# Patient Record
Sex: FEMALE | URBAN - METROPOLITAN AREA
[De-identification: names, ages, dates, MRNs, and addresses within clinical notes are randomized per-mention and may not be internally consistent; named-entity substitution may affect disease eponyms.]

---

## 2019-02-07 ENCOUNTER — APPOINTMENT (RX ONLY)
Dept: URBAN - METROPOLITAN AREA CLINIC 38 | Facility: CLINIC | Age: 79
Setting detail: DERMATOLOGY
End: 2019-02-07

## 2019-02-07 DIAGNOSIS — L57.0 ACTINIC KERATOSIS: ICD-10-CM

## 2019-02-07 DIAGNOSIS — L71.8 OTHER ROSACEA: ICD-10-CM

## 2019-02-07 PROCEDURE — ? COUNSELING

## 2019-02-07 PROCEDURE — ? LIQUID NITROGEN

## 2019-02-07 PROCEDURE — 17000 DESTRUCT PREMALG LESION: CPT

## 2019-02-07 PROCEDURE — 17003 DESTRUCT PREMALG LES 2-14: CPT

## 2019-02-07 PROCEDURE — 99202 OFFICE O/P NEW SF 15 MIN: CPT | Mod: 25

## 2019-02-07 ASSESSMENT — LOCATION DETAILED DESCRIPTION DERM
LOCATION DETAILED: LEFT DORSAL RING METACARPOPHALANGEAL JOINT
LOCATION DETAILED: NASAL DORSUM
LOCATION DETAILED: LEFT ULNAR DORSAL HAND
LOCATION DETAILED: RIGHT MEDIAL FOREHEAD

## 2019-02-07 ASSESSMENT — LOCATION SIMPLE DESCRIPTION DERM
LOCATION SIMPLE: RIGHT FOREHEAD
LOCATION SIMPLE: LEFT HAND
LOCATION SIMPLE: NOSE

## 2019-02-07 ASSESSMENT — LOCATION ZONE DERM
LOCATION ZONE: FACE
LOCATION ZONE: NOSE
LOCATION ZONE: HAND

## 2019-02-07 NOTE — HPI: SKIN LESION
Is This A New Presentation, Or A Follow-Up?: Skin Lesion
How Severe Is Your Skin Lesion?: mild
Has Your Skin Lesion Been Treated?: been treated
When Was It Treated?: 2018

## 2020-02-26 ENCOUNTER — APPOINTMENT (RX ONLY)
Dept: URBAN - METROPOLITAN AREA CLINIC 38 | Facility: CLINIC | Age: 80
Setting detail: DERMATOLOGY
End: 2020-02-26

## 2020-02-26 DIAGNOSIS — Z85.828 PERSONAL HISTORY OF OTHER MALIGNANT NEOPLASM OF SKIN: ICD-10-CM

## 2020-02-26 DIAGNOSIS — L82.1 OTHER SEBORRHEIC KERATOSIS: ICD-10-CM

## 2020-02-26 DIAGNOSIS — Z71.89 OTHER SPECIFIED COUNSELING: ICD-10-CM

## 2020-02-26 DIAGNOSIS — L81.4 OTHER MELANIN HYPERPIGMENTATION: ICD-10-CM

## 2020-02-26 DIAGNOSIS — L57.0 ACTINIC KERATOSIS: ICD-10-CM

## 2020-02-26 DIAGNOSIS — D22 MELANOCYTIC NEVI: ICD-10-CM

## 2020-02-26 DIAGNOSIS — L20.89 OTHER ATOPIC DERMATITIS: ICD-10-CM

## 2020-02-26 DIAGNOSIS — D18.0 HEMANGIOMA: ICD-10-CM

## 2020-02-26 PROBLEM — L20.84 INTRINSIC (ALLERGIC) ECZEMA: Status: ACTIVE | Noted: 2020-02-26

## 2020-02-26 PROBLEM — D18.01 HEMANGIOMA OF SKIN AND SUBCUTANEOUS TISSUE: Status: ACTIVE | Noted: 2020-02-26

## 2020-02-26 PROBLEM — D22.9 MELANOCYTIC NEVI, UNSPECIFIED: Status: ACTIVE | Noted: 2020-02-26

## 2020-02-26 PROCEDURE — 17000 DESTRUCT PREMALG LESION: CPT

## 2020-02-26 PROCEDURE — ? LIQUID NITROGEN

## 2020-02-26 PROCEDURE — 99213 OFFICE O/P EST LOW 20 MIN: CPT | Mod: 25

## 2020-02-26 PROCEDURE — ? COUNSELING

## 2020-02-26 ASSESSMENT — LOCATION DETAILED DESCRIPTION DERM
LOCATION DETAILED: PERIUMBILICAL SKIN
LOCATION DETAILED: RIGHT INFERIOR UPPER BACK
LOCATION DETAILED: RIGHT VENTRAL PROXIMAL FOREARM
LOCATION DETAILED: RIGHT ANTERIOR PROXIMAL THIGH
LOCATION DETAILED: RIGHT INFERIOR POSTAURICULAR SKIN

## 2020-02-26 ASSESSMENT — LOCATION SIMPLE DESCRIPTION DERM
LOCATION SIMPLE: ABDOMEN
LOCATION SIMPLE: RIGHT THIGH
LOCATION SIMPLE: RIGHT UPPER BACK
LOCATION SIMPLE: POSTERIOR SCALP
LOCATION SIMPLE: RIGHT FOREARM

## 2020-02-26 ASSESSMENT — LOCATION ZONE DERM
LOCATION ZONE: SCALP
LOCATION ZONE: TRUNK
LOCATION ZONE: ARM
LOCATION ZONE: LEG

## 2020-10-06 ENCOUNTER — APPOINTMENT (OUTPATIENT)
Dept: RADIOLOGY | Facility: MEDICAL CENTER | Age: 80
End: 2020-10-06
Attending: HOSPITALIST
Payer: MEDICARE

## 2020-10-06 ENCOUNTER — HOSPITAL ENCOUNTER (OUTPATIENT)
Facility: MEDICAL CENTER | Age: 80
End: 2020-10-07
Attending: INTERNAL MEDICINE | Admitting: INTERNAL MEDICINE
Payer: MEDICARE

## 2020-10-06 ENCOUNTER — HOSPITAL ENCOUNTER (OUTPATIENT)
Dept: RADIOLOGY | Facility: MEDICAL CENTER | Age: 80
End: 2020-10-06
Payer: MEDICARE

## 2020-10-06 DIAGNOSIS — G45.9 TIA (TRANSIENT ISCHEMIC ATTACK): ICD-10-CM

## 2020-10-06 PROBLEM — E78.5 DYSLIPIDEMIA: Status: ACTIVE | Noted: 2020-10-06

## 2020-10-06 PROBLEM — G31.84 MILD COGNITIVE IMPAIRMENT: Status: ACTIVE | Noted: 2020-10-06

## 2020-10-06 PROBLEM — Z86.79 HISTORY OF ATRIAL FIBRILLATION: Status: ACTIVE | Noted: 2020-10-06

## 2020-10-06 LAB
COVID ORDER STATUS COVID19: NORMAL
SARS-COV-2 RNA RESP QL NAA+PROBE: NOTDETECTED
SPECIMEN SOURCE: NORMAL

## 2020-10-06 PROCEDURE — 93880 EXTRACRANIAL BILAT STUDY: CPT

## 2020-10-06 PROCEDURE — 70551 MRI BRAIN STEM W/O DYE: CPT

## 2020-10-06 PROCEDURE — G0378 HOSPITAL OBSERVATION PER HR: HCPCS

## 2020-10-06 PROCEDURE — C9803 HOPD COVID-19 SPEC COLLECT: HCPCS | Performed by: INTERNAL MEDICINE

## 2020-10-06 PROCEDURE — U0003 INFECTIOUS AGENT DETECTION BY NUCLEIC ACID (DNA OR RNA); SEVERE ACUTE RESPIRATORY SYNDROME CORONAVIRUS 2 (SARS-COV-2) (CORONAVIRUS DISEASE [COVID-19]), AMPLIFIED PROBE TECHNIQUE, MAKING USE OF HIGH THROUGHPUT TECHNOLOGIES AS DESCRIBED BY CMS-2020-01-R: HCPCS

## 2020-10-06 PROCEDURE — 99204 OFFICE O/P NEW MOD 45 MIN: CPT | Performed by: HOSPITALIST

## 2020-10-06 RX ORDER — ASPIRIN 300 MG/1
300 SUPPOSITORY RECTAL DAILY
Status: DISCONTINUED | OUTPATIENT
Start: 2020-10-07 | End: 2020-10-07 | Stop reason: HOSPADM

## 2020-10-06 RX ORDER — ASPIRIN 81 MG/1
324 TABLET, CHEWABLE ORAL DAILY
Status: DISCONTINUED | OUTPATIENT
Start: 2020-10-07 | End: 2020-10-07 | Stop reason: HOSPADM

## 2020-10-06 RX ORDER — ACETAMINOPHEN 325 MG/1
650 TABLET ORAL EVERY 6 HOURS PRN
Status: DISCONTINUED | OUTPATIENT
Start: 2020-10-06 | End: 2020-10-07 | Stop reason: HOSPADM

## 2020-10-06 RX ORDER — AMOXICILLIN 250 MG
2 CAPSULE ORAL 2 TIMES DAILY
Status: DISCONTINUED | OUTPATIENT
Start: 2020-10-06 | End: 2020-10-07 | Stop reason: HOSPADM

## 2020-10-06 RX ORDER — ASPIRIN 325 MG
325 TABLET ORAL DAILY
Status: DISCONTINUED | OUTPATIENT
Start: 2020-10-07 | End: 2020-10-07 | Stop reason: HOSPADM

## 2020-10-06 RX ORDER — ATORVASTATIN CALCIUM 40 MG/1
40 TABLET, FILM COATED ORAL EVERY EVENING
Status: DISCONTINUED | OUTPATIENT
Start: 2020-10-06 | End: 2020-10-07 | Stop reason: HOSPADM

## 2020-10-06 RX ORDER — BISACODYL 10 MG
10 SUPPOSITORY, RECTAL RECTAL
Status: DISCONTINUED | OUTPATIENT
Start: 2020-10-06 | End: 2020-10-07 | Stop reason: HOSPADM

## 2020-10-06 RX ORDER — POLYETHYLENE GLYCOL 3350 17 G/17G
1 POWDER, FOR SOLUTION ORAL
Status: DISCONTINUED | OUTPATIENT
Start: 2020-10-06 | End: 2020-10-07 | Stop reason: HOSPADM

## 2020-10-06 ASSESSMENT — LIFESTYLE VARIABLES
ON A TYPICAL DAY WHEN YOU DRINK ALCOHOL HOW MANY DRINKS DO YOU HAVE: 1
HOW MANY TIMES IN THE PAST YEAR HAVE YOU HAD 5 OR MORE DRINKS IN A DAY: 0
DOES PATIENT WANT TO STOP DRINKING: NO
TOTAL SCORE: 0
HAVE YOU EVER FELT YOU SHOULD CUT DOWN ON YOUR DRINKING: NO
CONSUMPTION TOTAL: NEGATIVE
TOTAL SCORE: 0
ALCOHOL_USE: YES
EVER HAD A DRINK FIRST THING IN THE MORNING TO STEADY YOUR NERVES TO GET RID OF A HANGOVER: NO
HAVE PEOPLE ANNOYED YOU BY CRITICIZING YOUR DRINKING: NO
EVER FELT BAD OR GUILTY ABOUT YOUR DRINKING: NO
TOTAL SCORE: 0
AVERAGE NUMBER OF DAYS PER WEEK YOU HAVE A DRINK CONTAINING ALCOHOL: 1

## 2020-10-06 ASSESSMENT — PATIENT HEALTH QUESTIONNAIRE - PHQ9
SUM OF ALL RESPONSES TO PHQ9 QUESTIONS 1 AND 2: 0
1. LITTLE INTEREST OR PLEASURE IN DOING THINGS: NOT AT ALL
2. FEELING DOWN, DEPRESSED, IRRITABLE, OR HOPELESS: NOT AT ALL
1. LITTLE INTEREST OR PLEASURE IN DOING THINGS: NOT AT ALL
SUM OF ALL RESPONSES TO PHQ9 QUESTIONS 1 AND 2: 0
2. FEELING DOWN, DEPRESSED, IRRITABLE, OR HOPELESS: NOT AT ALL

## 2020-10-06 ASSESSMENT — ENCOUNTER SYMPTOMS
FEVER: 0
SPEECH CHANGE: 0
CHILLS: 0
MYALGIAS: 0
DOUBLE VISION: 0
WEAKNESS: 0
DIZZINESS: 0
BLURRED VISION: 0
SHORTNESS OF BREATH: 0

## 2020-10-06 NOTE — PROGRESS NOTES
Patient arrived to room 305 from [place] with ambulance. Alert and oriented times 4, oriented patient to room. Patient ambulated from rney to bed with standby assist.  at bedside. MD paged to notify of arrival.

## 2020-10-06 NOTE — PROGRESS NOTES
[facility] TRIAGE OFFICER DIRECT ADMISSION REPORT  Transferring facility: [facility]  Transferring physician: Dr Bills  Transferring facility/physician contact number: n/a  Chief complaint: TIA  Pertinent history & patient course:     80-year-old female presented to the [facility] with left-sided weakness, resolved, the patient will be transferred to our facility for higher level of care and neurology consult with work-up for TIA and stroke.     Further work up or recommendations per triage officer prior to transfer: None  Consultants called prior to transfer and pertinent input from consultants: Neurology Dr. Thompson  Patient accepted for transfer: Yes  Consultants to be called upon arrival: Dr. Thompson neurologist  Admission status: Observation.   Floor requested: Neurology or CDU  If ICU transfer, name of intensivist case discussed with and pertinent input from critical care: N/A    Please inform the triage officer upon arrival of the patient to [facility] for assignment of a hospitalist to perform admission.     For any question or concerns regarding the care of this patient, please reach out to the assigned hospitalist.

## 2020-10-06 NOTE — PROGRESS NOTES
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  Had a TIA-like episode of falling over to the right side.  Started around noon today.  Resolved after 1 to 2 hours.  Questionable aphasia.  Now asymptomatic per outside ER physician.  CT head was unremarkable.  While the patient transferred here get a CTA head and neck.  Also MRI brain.  DA PT therapy with aspirin and Plavix for 21 days.  Patient has a remote history of atrial fibrillation status post ablation.  She cannot tolerate any types of anticoagulation.      Anibal Koroma MD

## 2020-10-07 ENCOUNTER — APPOINTMENT (OUTPATIENT)
Dept: CARDIOLOGY | Facility: MEDICAL CENTER | Age: 80
End: 2020-10-07
Attending: HOSPITALIST
Payer: MEDICARE

## 2020-10-07 VITALS
OXYGEN SATURATION: 93 % | DIASTOLIC BLOOD PRESSURE: 56 MMHG | HEART RATE: 71 BPM | WEIGHT: 136.69 LBS | BODY MASS INDEX: 21.45 KG/M2 | RESPIRATION RATE: 16 BRPM | HEIGHT: 67 IN | TEMPERATURE: 97.1 F | SYSTOLIC BLOOD PRESSURE: 109 MMHG

## 2020-10-07 LAB
CHOLEST SERPL-MCNC: 179 MG/DL (ref 100–199)
EST. AVERAGE GLUCOSE BLD GHB EST-MCNC: 117 MG/DL
HBA1C MFR BLD: 5.7 % (ref 0–5.6)
HDLC SERPL-MCNC: 81 MG/DL
LDLC SERPL CALC-MCNC: 81 MG/DL
LV EJECT FRACT  99904: 70
LV EJECT FRACT MOD 2C 99903: 75.59
LV EJECT FRACT MOD 4C 99902: 71.97
LV EJECT FRACT MOD BP 99901: 75.26
TRIGL SERPL-MCNC: 87 MG/DL (ref 0–149)

## 2020-10-07 PROCEDURE — 83036 HEMOGLOBIN GLYCOSYLATED A1C: CPT

## 2020-10-07 PROCEDURE — 99203 OFFICE O/P NEW LOW 30 MIN: CPT | Mod: GC | Performed by: PSYCHIATRY & NEUROLOGY

## 2020-10-07 PROCEDURE — 96372 THER/PROPH/DIAG INJ SC/IM: CPT

## 2020-10-07 PROCEDURE — G0378 HOSPITAL OBSERVATION PER HR: HCPCS

## 2020-10-07 PROCEDURE — 700102 HCHG RX REV CODE 250 W/ 637 OVERRIDE(OP): Performed by: HOSPITALIST

## 2020-10-07 PROCEDURE — 93306 TTE W/DOPPLER COMPLETE: CPT | Mod: 26 | Performed by: INTERNAL MEDICINE

## 2020-10-07 PROCEDURE — 99217 PR OBSERVATION CARE DISCHARGE: CPT | Performed by: INTERNAL MEDICINE

## 2020-10-07 PROCEDURE — 700111 HCHG RX REV CODE 636 W/ 250 OVERRIDE (IP): Performed by: HOSPITALIST

## 2020-10-07 PROCEDURE — 93306 TTE W/DOPPLER COMPLETE: CPT

## 2020-10-07 PROCEDURE — 97166 OT EVAL MOD COMPLEX 45 MIN: CPT

## 2020-10-07 PROCEDURE — 80061 LIPID PANEL: CPT

## 2020-10-07 PROCEDURE — A9270 NON-COVERED ITEM OR SERVICE: HCPCS | Performed by: HOSPITALIST

## 2020-10-07 RX ORDER — ASPIRIN 81 MG/1
81 TABLET ORAL DAILY
Qty: 90 TAB | Refills: 1 | Status: SHIPPED | OUTPATIENT
Start: 2020-10-07 | End: 2021-12-14

## 2020-10-07 RX ADMIN — ENOXAPARIN SODIUM 40 MG: 40 INJECTION SUBCUTANEOUS at 06:43

## 2020-10-07 RX ADMIN — ASPIRIN 325 MG: 325 TABLET, FILM COATED ORAL at 06:43

## 2020-10-07 ASSESSMENT — COGNITIVE AND FUNCTIONAL STATUS - GENERAL
DAILY ACTIVITIY SCORE: 24
SUGGESTED CMS G CODE MODIFIER DAILY ACTIVITY: CH

## 2020-10-07 ASSESSMENT — ACTIVITIES OF DAILY LIVING (ADL): TOILETING: INDEPENDENT

## 2020-10-07 NOTE — ASSESSMENT & PLAN NOTE
She had transient symptoms of leaning to the left today  CT head and labs were negative at [place]  She will be given aspirin and allow permissive HTN  MRI brain ordered  Carotid dopplers ordered  Echocardiogram to eval for LV thrombus  Continuous tele monitoring to eval for afib  PT/OT/ST  She is on Evolocumab for dyslipidemia  She was determined to not be a candidate for alteplase as her symptoms resolved  Neurology was consulted prior to admit and will be contacted after the MRI is back.

## 2020-10-07 NOTE — CARE PLAN
Problem: Safety  Goal: Will remain free from injury  Outcome: PROGRESSING AS EXPECTED     Problem: Mobility  Goal: Risk for activity intolerance will decrease  Outcome: PROGRESSING AS EXPECTED

## 2020-10-07 NOTE — PROGRESS NOTES
Neurology Progress Note  Neurohospitalist Service, [facility]    Referring Physician: Juan Carlos Monterroso M.D.    CC: Leaning to the Left     HPI: Ms. Herndon is a 80-year old female who was transferred from an outside hospital due to concerns for TIA.     Interval History: No acute events overnight. Patient reports that she is feeling back to baseline. She denies any balance difficulty and worked well with PT. She reports that she was tolerant to aspirin in the past and does take it occasionally at home for pain. She is also on Repatha for her cholesterol.     Carotid ultrasound, echo and MRI brain are unremarkable.     Review of systems: In addition to what is detailed in the HPI and/or updated in the interval history, all other systems reviewed and are negative.    Past Medical History:   Mild Cognitive impairment, dyslipidemia, Atrial fibrillation status post ablation [place] in 2017-intolerant to statins    FHx:  No family history of CVA, mother with Alzheimer's     SHx:   reports that she has never smoked. She has never used smokeless tobacco.    Medications:    Current Facility-Administered Medications:   •  senna-docusate (PERICOLACE or SENOKOT S) 8.6-50 MG per tablet 2 Tab, 2 Tab, Oral, BID **AND** polyethylene glycol/lytes (MIRALAX) PACKET 1 Packet, 1 Packet, Oral, QDAY PRN **AND** magnesium hydroxide (MILK OF MAGNESIA) suspension 30 mL, 30 mL, Oral, QDAY PRN **AND** bisacodyl (DULCOLAX) suppository 10 mg, 10 mg, Rectal, QDAY PRN, Jeremy Chaudhary M.D.  •  Pharmacy consult request - Allow for permissive hypertension: SBP up to 220 mmHg/DBP up to 120 mmHg x 48 hours, , Other, PHARMACY TO DOSE, Jeremy Chaudhary M.D.  •  enoxaparin (LOVENOX) inj 40 mg, 40 mg, Subcutaneous, DAILY, Jeremy Chaudhary M.D., 40 mg at 10/07/20 0643  •  acetaminophen (TYLENOL) tablet 650 mg, 650 mg, Oral, Q6HRS PRN, Jeremy Chaudhary M.D.  •  atorvastatin (LIPITOR) tablet 40 mg, 40 mg, Oral, Q EVENING, Jeremy Chaudhary  M.D.  •  aspirin (ASA) tablet 325 mg, 325 mg, Oral, DAILY, 325 mg at 10/07/20 0643 **OR** aspirin (ASA) chewable tab 324 mg, 324 mg, Oral, DAILY **OR** aspirin (ASA) suppository 300 mg, 300 mg, Rectal, DAILY, Jeremy Chaudhary M.D.    Physical Examination:     Vitals:    10/06/20 2010 10/06/20 2341 10/07/20 0500 10/07/20 0816   BP: 118/56 104/64 106/57 109/56   Pulse: 68 65 67 71   Resp: 20 18 16 16   Temp: 36.6 °C (97.8 °F) 36.4 °C (97.5 °F) 36.4 °C (97.6 °F) 36.2 °C (97.1 °F)   TempSrc: Temporal Temporal Temporal Temporal   SpO2: 92% 93% 96% 93%   Weight:       Height:           General: Patient is awake and in no acute distress  Eyes: examination of optic disks not indicated at this time  CV: RRR    NEUROLOGICAL EXAM:     Mental status: Sitting on edge of bed eating breakfast. Awake, alert and fully oriented, follows commands  Speech and language: speech is not dysarthric. The patient is able to name and repeat.  Cranial nerve exam: Pupils are equal, round and reactive to light bilaterally. Visual fields are full. Extraocular muscles are intact. Sensation in the face is intact to light touch. Face is symmetric. Hearing to finger rub equal. Palate elevates symmetrically. Shoulder shrug is full. Tongue is midline.  Motor exam: Strength is 5/5 in all extremities both distally and proximally. Tone is normal. No abnormal movements were seen on exam.  Sensory exam: No sensory deficits identified   Deep tendon reflexes:  Toes down-going bilaterally.  Coordination: no ataxia   Gait: deferred given patient preference    Objective Data:    Labs:  No results found for: PROTHROMBTM, INR   No results found for: WBC, RBC, HEMOGLOBIN, HEMATOCRIT, MCV, MCH, MCHC, MPV, NEUTSPOLYS, LYMPHOCYTES, MONOCYTES, EOSINOPHILS, BASOPHILS, HYPOCHROMIA, ANISOCYTOSIS   No results found for: SODIUM, POTASSIUM, CHLORIDE, CO2, GLUCOSE, BUN, CREATININE, BUNCREATRAT, GLOMRATE   Lab Results   Component Value Date/Time    CHOLSTRLTOT 179 10/07/2020  05:50 AM    LDL 81 10/07/2020 05:50 AM    HDL 81 10/07/2020 05:50 AM    TRIGLYCERIDE 87 10/07/2020 05:50 AM       No results found for: ALKPHOSPHAT, ASTSGOT, ALTSGPT, TBILIRUBIN     Imaging/Testing:    I interpreted and/or reviewed the patient's neuroimaging    EC-ECHOCARDIOGRAM COMPLETE W/O CONT   Final Result      US-CAROTID DOPPLER BILAT   Final Result      MR-BRAIN-W/O   Final Result      1.  Moderate cerebral atrophy. Age-appropriate.   2.  Minimal supratentorial white matter disease most consistent with microvascular ischemic change.   3.  No evidence of acute infarction, hemorrhage, or mass lesion.          Assessment and Plan:    Gloria Herndon is a 80 y.o. female presenting for whom neurology has been consulted for symptoms concerning for TIA. MRI is negative for acute infarct. It is possible that patient may have had a TIA. However, in setting of history of atrial fibrillation, patient should ideally be on anti-coagulation. Given intolerance to multiple anti-coagulants in the past, patient would like to speak to her physician in [place] before she decides to start Aspirin 81mg daily.     Plan:  - Recommend Asprin 81mg daily as patient is intolerant to multiple anti-coagulants in the past   - Continue PCSK-9 inhibitor   - Okay to discharge from neurology standpoint.  - Neurology team will be signing off, thank you for this consult.     The evaluation of the patient, and recommended management, was discussed with the Neurology attending, Dr. Thompson. I have performed a physical exam and reviewed and updated ROS and Plan today (10/7/2020). In review of yesterday's note (10/6/2020), there are no changes except as documented above.    Saige Lopez DO  Internal Medicine Resident, PGY-3

## 2020-10-07 NOTE — THERAPY
Occupational Therapy   Initial Evaluation     Patient Name: Gloria Herndon  Age:  80 y.o., Sex:  female  Medical Record #: [id number]  Today's Date: 10/7/2020          Assessment  Patient is 80 y.o. female with a diagnosis of TIA.  Additional factors influencing patient status / progress: resolution of symptoms, good family support at home. Good insight into limitations/ needs.      Plan    Recommend Occupational Therapy for Evaluation only for the following treatments:  NA.    DC Equipment Recommendations: None  Discharge Recommendations: (P) Anticipate that the patient will have no further occupational therapy needs after discharge from the hospital(may benefit from SLP to work on memory/ word finding deficit)     Subjective    Pleasant and cooperative, motivated to go home     Objective       10/07/20 0730   Total Time Spent   Total Time Spent (Mins) 42   Charge Group   OT Evaluation OT Evaluation Mod   Initial Contact Note    Initial Contact Note Order Received and Verified, Evaluation Only - Patient Does Not Require Further Acute Occupational Therapy at this Time.  However, May Benefit from Post Acute Therapy for Higher Level Functional Deficits.   Prior Living Situation   Prior Services None  (has a  3 x weekly)   Housing / Facility 3 Story House   Steps Into Home   (3 flights up from garage, B rails)   Steps In Home   (flight between each floor)   Rail Both Rail (Steps into Home)   Elevator No   Bathroom Set up Walk In Shower;Grab Bars   Equipment Owned   (walking stick)   Lives with - Patient's Self Care Capacity Spouse;Adult Children   Comments adult son is living with her. recently laid off, so home as needed   Prior Level of ADL Function   Self Feeding Independent   Grooming / Hygiene Independent   Bathing Independent   Dressing Independent   Toileting Independent   Prior Level of IADL Function   Medication Management Independent   Laundry Independent   Kitchen Mobility Independent    Finances Independent   Home Management Independent   Shopping Independent   Prior Level Of Mobility Independent Without Device in Community   Driving / Transportation Driving Independent   Occupation (Pre-Hospital Vocational)   (retired infectious disease MD)   Vitals   O2 Delivery Device None - Room Air   Pain 0 - 10 Group   Therapist Pain Assessment Post Activity Pain Same as Prior to Activity;Nurse Notified;0   Cognition    Cognition / Consciousness WDL   Level of Consciousness Alert   Comments noted word finding difficulties, has been ongoing for a few months. educated regarding out patient SLP programs to asssit with compesatory strategies   Passive ROM Upper Body   Passive ROM Upper Body WDL   Active ROM Upper Body   Active ROM Upper Body  WDL   Dominant Hand Right   Strength Upper Body   Upper Body Strength  WDL   Sensation Upper Body   Upper Extremity Sensation  WDL   Upper Body Muscle Tone   Upper Body Muscle Tone  WDL   Coordination Upper Body   Coordination WDL   Balance Assessment   Sitting Balance (Static) Good   Sitting Balance (Dynamic) Good   Standing Balance (Static) Good   Standing Balance (Dynamic) Good   Weight Shift Sitting Good   Weight Shift Standing Good   Bed Mobility    Supine to Sit Supervised   Sit to Supine Supervised   Scooting Supervised   Rolling Supervised   ADL Assessment   Eating Modified Independent   Grooming Modified Independent;Standing   Bathing   (NT)   Upper Body Dressing Supervision   Lower Body Dressing Supervision   Toileting Supervision   How much help from another person does the patient currently need...   Putting on and taking off regular lower body clothing? 4   Bathing (including washing, rinsing, and drying)? 4   Toileting, which includes using a toilet, bedpan, or urinal? 4   Putting on and taking off regular upper body clothing? 4   Taking care of personal grooming such as brushing teeth? 4   Eating meals? 4   6 Clicks Daily Activity Score 24   Functional  Mobility   Sit to Stand Supervised   Bed, Chair, Wheelchair Transfer Supervised   Toilet Transfers Supervised   Transfer Method Stand Pivot   Visual Perception   Visual Perception  WDL   Activity Tolerance   Sitting in Chair ad katherine   Sitting Edge of Bed ad katherine   Standing ad katherine   Education Group   Role of Occupational Therapist Patient Response Patient;Acceptance;Explanation;Demonstration;Verbal Demonstration   Anticipated Discharge Equipment and Recommendations   DC Equipment Recommendations None   Discharge Recommendations Anticipate that the patient will have no further occupational therapy needs after discharge from the hospital  (may benefit from SLP to work on memory/ word finding deficit)   Interdisciplinary Plan of Care Collaboration   IDT Collaboration with  Nursing   Patient Position at End of Therapy In Bed;Call Light within Reach;Tray Table within Reach;Phone within Reach   Collaboration Comments report given   Session Information   Date / Session Number  10/7,1/1

## 2020-10-07 NOTE — PROGRESS NOTES
"Assessment completed. Pt A&Ox4. Respirations are even and unlabored on RA. Pt denies pain at this time. Neuro intact. States she is \"fine\". Monitors applied, VS stable, call light and belongings within reach. POC updated (possible d/c). Pt educated on room and call light, pt verbalized understanding. Communication board updated. Needs met.  at bedside.  "

## 2020-10-07 NOTE — DISCHARGE INSTRUCTIONS
Discharge Instructions    Discharged to home by car with relative. Discharged via walking, hospital escort: Refused.  Special equipment needed: Not Applicable    Be sure to schedule a follow-up appointment with your primary care doctor or any specialists as instructed.     Discharge Plan:   Diet Plan: Discussed  Activity Level: Discussed  Confirmed Follow up Appointment: Appointment Scheduled  Confirmed Symptoms Management: Discussed  Medication Reconciliation Updated: Yes  Influenza Vaccine Indication: Not indicated: Previously immunized this influenza season and > 8 years of age    I understand that a diet low in cholesterol, fat, and sodium is recommended for good health. Unless I have been given specific instructions below for another diet, I accept this instruction as my diet prescription.   Other diet: heart healthy    Special Instructions: None    · Is patient discharged on Warfarin / Coumadin?   No     Depression / Suicide Risk    As you are discharged from this [facility] facility, it is important to learn how to keep safe from harming yourself.    Recognize the warning signs:  · Abrupt changes in personality, positive or negative- including increase in energy   · Giving away possessions  · Change in eating patterns- significant weight changes-  positive or negative  · Change in sleeping patterns- unable to sleep or sleeping all the time   · Unwillingness or inability to communicate  · Depression  · Unusual sadness, discouragement and loneliness  · Talk of wanting to die  · Neglect of personal appearance   · Rebelliousness- reckless behavior  · Withdrawal from people/activities they love  · Confusion- inability to concentrate     If you or a loved one observes any of these behaviors or has concerns about self-harm, here's what you can do:  · Talk about it- your feelings and reasons for harming yourself  · Remove any means that you might use to hurt yourself (examples: pills, rope, extension cords,  firearm)  · Get professional help from the community (Mental Health, Substance Abuse, psychological counseling)  · Do not be alone:Call your Safe Contact- someone whom you trust who will be there for you.  · Call your local CRISIS HOTLINE 860-8016 or 930-867-4904  · Call your local Children's Mobile Crisis Response Team [place] (126) 504-4161 or www.Bizware  · Call the toll free National Suicide Prevention Hotlines   · National Suicide Prevention Lifeline 179-372-WYYR (3107)  · National Hope Line Network 800-SUICIDE (150-6136)

## 2020-10-07 NOTE — PROGRESS NOTES
Pt transported to MRI at this time via wheelchair. Tele temporarily disconnected per order.  Will monitor for pt's return.

## 2020-10-07 NOTE — PROGRESS NOTES
Telemetry throughout the night shows NSR 59-90 with 1st degree heart block. IL 0.21 / QRS 0.07 / QT 0.45

## 2020-10-07 NOTE — ASSESSMENT & PLAN NOTE
With history of ablation and subsequent outpatient monitoring revealing paroxysmal afib. Her cardiologist recommended a Watchman device which she deferred.   She has been intolerant to all anticoagulants and has historically refused them.   Continuous tele monitoring

## 2020-10-07 NOTE — DISCHARGE PLANNING
[facility] Transitional Care Coordination     Referral from:  Dr. Chaudhary    Facesheet indicates: MCR    Potential Rehab Diagnosis: CVA?    Chart review indicates patient may have on going medical management and may have therapy needs to possibly meet inpatient rehab facility criteria with the goal of returning to community.    D/C support: TBD     Physiatry consultation pended per protocol.      TIA?  MRI is negative for an acute infarct.  TX pending.  Waiting on additional information to determine appropriateness for acute inpatient rehabilitation. Will continue to follow.     Thank you for the referral.

## 2020-10-07 NOTE — CARE PLAN
Problem: Safety  Goal: Will remain free from injury  Outcome: PROGRESSING AS EXPECTED  Goal: Will remain free from falls  Outcome: PROGRESSING AS EXPECTED     Problem: Venous Thromboembolism (VTW)/Deep Vein Thrombosis (DVT) Prevention:  Goal: Patient will participate in Venous Thrombosis (VTE)/Deep Vein Thrombosis (DVT)Prevention Measures  Outcome: PROGRESSING AS EXPECTED     Problem: Psychosocial Needs:  Goal: Level of anxiety will decrease  Outcome: PROGRESSING AS EXPECTED

## 2020-10-07 NOTE — THERAPY
"Missed Therapy     Patient Name: Gloria Herndon  Age:  80 y.o., Sex:  female  Medical Record #: [id number]  Today's Date: 10/7/2020       10/07/20 1016   Treatment Variance   Reason For Missed Therapy Non-Medical - Other (Please Comment)   Interdisciplinary Plan of Care Collaboration   IDT Collaboration with  Nursing   Collaboration Comments Orders received for a clinical swallow evaluation and cognitive-linguistic evaluation. Per RN, SLP consult not indicated and OK to cancel order. MRI demonstrating \"no evidence of acute infarction, hemorrhage, or mass lesion.\" Please re-consult with change in status or concerns for new dysphagia or cognitive deficits. Thank you.      "

## 2020-10-07 NOTE — DISCHARGE SUMMARY
Discharge Summary    CHIEF COMPLAINT ON ADMISSION  Left-sided weakness    Reason for Admission  TIA     Admission Date  10/6/2020    CODE STATUS  Full Code    HPI & HOSPITAL COURSE  Ms. Herndon is an 80-year-old female with a past medical history of atrial fibrillation status post ablation in 2017 and an intolerance to coumadin and all oral anticoagulants who presented to the [facility] emergency department on 10/6/2020 with left-sided weakness.  She reportedly did not have any facial droop and her speech was clear.  She was found to be leaning to the left.  CAT scan done at the outlying facility was negative, labs unremarkable, and chest x-ray also normal.  She was given aspirin and Plavix and neurology was consulted via telemedicine who recommended against alteplase given her symptoms had resolved.  She was then transferred here for higher level of care and official neurologic consultation.  An MRI was done and was negative for CVA or other abnormalities.  Carotid Dopplers were also performed and showed no flow-limiting stenosis of either ICA but did show irregular plaque on the surface of the left carotid artery with calcific acoustic density.  An echocardiogram was also done and was unremarkable.  Neurology has recommended she continue her home cholesterol medication and take a baby aspirin after discharge from the hospital.  She is agreeable to such.  On the day of discharge her vital signs and lab work are stable, she is independently ambulatory, and she has been cleared from both neurology and medicine standpoint for discharge home today.  She has been advised to follow-up with the [facility] within 7 days of hospital discharge.     Therefore, she is discharged in good and stable condition to home with close outpatient follow-up.    Discharge Date  10/7/2020    FOLLOW UP ITEMS POST DISCHARGE  PCP in 5-7 days.  [facility] within 7 days of hospital discharge.    DISCHARGE  DIAGNOSES  Principal Problem:    TIA (transient ischemic attack) POA: Yes  Active Problems:    History of atrial fibrillation POA: Yes    Dyslipidemia POA: Yes    Mild cognitive impairment POA: Yes  Resolved Problems:    * No resolved hospital problems. *    FOLLOW UP  Future Appointments   Date Time Provider Department Center   10/21/2020  9:00 AM [facility] None     Rambo Mullins M.D.  [address] [facility] [id number] [phone]        MEDICATIONS ON DISCHARGE     Medication List      Start taking these medications      Instructions   aspirin 81 MG EC tablet   Take 1 Tab by mouth every day.  Dose: 81 mg        Continue taking these medications      Instructions   estrogens, conjugated 0.3 MG Tabs  Commonly known as: PREMARIN   Take 0.3 mg by mouth every day.  Dose: 0.3 mg          Allergies  Allergies   Allergen Reactions   • Iodine Solution [Povidone Iodine]      Red spots, pain   • Tape      DIET  Orders Placed This Encounter   Procedures   • Diet Order Cardiac     Standing Status:   Standing     Number of Occurrences:   1     Order Specific Question:   Diet:     Answer:   Cardiac [6]     Order Specific Question:   Miscellaneous modifications:     Answer:   Vegetarian [13]     ACTIVITY  As tolerated.  Exercise encouraged.  Weight bearing as tolerated    CONSULTATIONS  Neurology    PROCEDURES  None    LABORATORY  No results found for: SODIUM, POTASSIUM, CHLORIDE, CO2, GLUCOSE, BUN, CREATININE, GLOMRATE     No results found for: WBC, HEMOGLOBIN, HEMATOCRIT, PLATELETCT     Total time of the discharge process exceeds 32 minutes.      Sherice Chew, MSN, RN, APRN, ACNPC-AG, CCRN  Nurse Practitioner, [facility]  [phone]    10/7/2020    1:31 PM

## 2020-10-07 NOTE — H&P
"Hospital Medicine History & Physical Note    Date of Service  10/6/2020    Primary Care Physician  Rambo Mullins M.D.    Consultants  neurology    Code Status  Full Code    Chief Complaint  Possible TIA    History of Presenting Illness  80 y.o. female who presented 10/6/2020 with leaning to the left. 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  He states side was not moving correctly for she yelled out and her  and son pain to her side.  He helped hold her up as she was listing to the left.  Her  Milan is at bedside and states she did not have a facial droop and her speech was clear.  He did not notice any abnormal weakness just that she was leaning to the left.  911 was called she is brought to the emergency room in [facility] where a CAT scan was negative labs were negative chest x-ray is negative given aspirin and Plavix and neurology consulted via telemedicine recommended against alteplase as her symptoms had resolved was transferred here for higher level of care.  She states that not able to tolerate any blood thinners because they made her lose weight, cause bleeding, it her feel \"terrible\" and and her cardiologist only did an outpatient react monitor they found paroxysmal atrial fibrillation and she refused a watchman device and would not take anticoagulation.  She recently had a Zoom conference with a neurologist out of [place] due to \"difficulty retrieving names of things\" told that she has mild cognitive impairment but not dementia.  During her interview, her  Milan is at bedside.  She denies exposure to persons known to have COVID, denies fevers and chills, no shortness of breath or cough, no body aches no " change in her taste or smell.  A screening COVID is been ordered per hospital policy.  Platelets 260. Cr 0.75.  CT head negative.  Review of Systems  Review of Systems   Constitutional: Negative for chills and fever.   Eyes: Negative for blurred vision and double vision.   Respiratory: Negative for shortness of breath.    Cardiovascular: Negative for chest pain.   Musculoskeletal: Negative for myalgias.   Neurological: Negative for dizziness, speech change and weakness.   All other systems reviewed and are negative.      Past Medical History  MCI, dyslipidema, afib with ablation [place] 2017    Surgical History  hysterectomy     Family History  Significant hx of hypertrophic cardiomyopathy and a father that  suddenly in his 50's. Mother had Alzheimers. No family hx of CVA    Social History  She quit smoking at 29, she lives with her  in [place], social wine    Allergies  Allergies   Allergen Reactions   • Iodine Solution [Povidone Iodine]      Red spots, pain   • Tape        Medications  Prior to Admission Medications   Prescriptions Last Dose Informant Patient Reported? Taking?   estrogens, conjugated (PREMARIN) 0.3 MG Tab 10/6/2020 at 0800  Yes Yes   Sig: Take 0.3 mg by mouth every day.      Facility-Administered Medications: None       Physical Exam  Temp:  [36.6 °C (97.8 °F)] 36.6 °C (97.8 °F)  Pulse:  [65] 65  Resp:  [16] 16  BP: (115)/(59) 115/59  SpO2:  [96 %] 96 %    Physical Exam  Vitals signs and nursing note reviewed.   Constitutional:       Appearance: Normal appearance. She is not ill-appearing.   HENT:      Head: Normocephalic and atraumatic.      Mouth/Throat:      Mouth: Mucous membranes are dry.      Pharynx: Oropharynx is clear.   Eyes:      General: No scleral icterus.     Conjunctiva/sclera: Conjunctivae normal.   Neck:      Musculoskeletal: Normal range of motion and neck supple.      Comments: No bruits   Cardiovascular:      Rate and Rhythm: Normal rate and regular rhythm.       Heart sounds: No murmur.   Pulmonary:      Effort: Pulmonary effort is normal.      Breath sounds: Normal breath sounds.   Abdominal:      General: There is no distension.   Musculoskeletal:      Right lower leg: No edema.      Left lower leg: No edema.   Skin:     General: Skin is warm and dry.   Neurological:      General: No focal deficit present.      Mental Status: She is alert and oriented to person, place, and time. Mental status is at baseline.      Cranial Nerves: No cranial nerve deficit.      Sensory: No sensory deficit.      Motor: No weakness.   Psychiatric:         Mood and Affect: Mood normal.         Behavior: Behavior normal.         Laboratory:          No results for input(s): ALTSGPT, ASTSGOT, ALKPHOSPHAT, TBILIRUBIN, DBILIRUBIN, GAMMAGT, AMYLASE, LIPASE, ALB, PREALBUMIN, GLUCOSE in the last 72 hours.      No results for input(s): NTPROBNP in the last 72 hours.      No results for input(s): TROPONINT in the last 72 hours.    Imaging:  EC-ECHOCARDIOGRAM COMPLETE W/ CONT    (Results Pending)   US-CAROTID DOPPLER BILAT    (Results Pending)   MR-BRAIN-W/O    (Results Pending)         Assessment/Plan:  I anticipate this patient is appropriate for observation status at this time.    * TIA (transient ischemic attack)- (present on admission)  Assessment & Plan  She had transient symptoms of leaning to the left today  CT head and labs were negative at [facility]  She will be given aspirin and allow permissive HTN  MRI brain ordered  Carotid dopplers ordered  Echocardiogram to eval for LV thrombus  Continuous tele monitoring to eval for afib  PT/OT/ST  She is on Evolocumab for dyslipidemia  She was determined to not be a candidate for alteplase as her symptoms resolved  Neurology was consulted prior to admit and will be contacted after the MRI is back.    History of atrial fibrillation- (present on admission)  Assessment & Plan  With history of ablation and subsequent outpatient monitoring  revealing paroxysmal afib. Her cardiologist recommended a Watchman device which she deferred.   She has been intolerant to all anticoagulants and has historically refused them.   Continuous tele monitoring    Dyslipidemia- (present on admission)  Assessment & Plan  She is on Evolocumab a PCSK9 inhibitor outpatient  Lipid panel ordered    Mild cognitive impairment- (present on admission)  Assessment & Plan  Followed by neurology at [place]

## 2020-10-16 DIAGNOSIS — G45.9 TIA (TRANSIENT ISCHEMIC ATTACK): ICD-10-CM

## 2020-10-28 ENCOUNTER — OFFICE VISIT (OUTPATIENT)
Dept: NEUROLOGY | Facility: MEDICAL CENTER | Age: 80
End: 2020-10-28
Payer: MEDICARE

## 2020-10-28 VITALS
HEART RATE: 77 BPM | TEMPERATURE: 97.9 F | DIASTOLIC BLOOD PRESSURE: 60 MMHG | BODY MASS INDEX: 21.9 KG/M2 | SYSTOLIC BLOOD PRESSURE: 102 MMHG | OXYGEN SATURATION: 97 % | WEIGHT: 139.55 LBS | HEIGHT: 67 IN | RESPIRATION RATE: 14 BRPM

## 2020-10-28 DIAGNOSIS — Z86.79 HISTORY OF ATRIAL FIBRILLATION: ICD-10-CM

## 2020-10-28 DIAGNOSIS — G45.9 TIA (TRANSIENT ISCHEMIC ATTACK): ICD-10-CM

## 2020-10-28 PROCEDURE — 99215 OFFICE O/P EST HI 40 MIN: CPT | Performed by: NURSE PRACTITIONER

## 2020-10-28 RX ORDER — METHYLPHENIDATE HYDROCHLORIDE 20 MG/1
TABLET ORAL PRN
COMMUNITY
Start: 2020-10-13 | End: 2022-06-23

## 2020-10-28 RX ORDER — ERGOCALCIFEROL 1.25 MG/1
50000 CAPSULE ORAL
COMMUNITY
End: 2022-05-31

## 2020-10-28 RX ORDER — ACYCLOVIR 400 MG/1
400-800 TABLET ORAL
Status: ON HOLD | COMMUNITY
Start: 2020-07-24 | End: 2022-08-09

## 2020-10-28 ASSESSMENT — ENCOUNTER SYMPTOMS
SHORTNESS OF BREATH: 0
DIZZINESS: 0
SPEECH CHANGE: 1
HEADACHES: 0
BRUISES/BLEEDS EASILY: 0
HEARTBURN: 0
COUGH: 0
NERVOUS/ANXIOUS: 0
VOMITING: 1
BLURRED VISION: 0
DOUBLE VISION: 0
NAUSEA: 1
FEVER: 0
MYALGIAS: 1
CHILLS: 0
DEPRESSION: 0
PALPITATIONS: 0

## 2020-10-28 ASSESSMENT — PATIENT HEALTH QUESTIONNAIRE - PHQ9: CLINICAL INTERPRETATION OF PHQ2 SCORE: 0

## 2020-10-28 NOTE — PROGRESS NOTES
Subjective:    [facility]  Gloria Herndon is a 80  y.o. right handed female who presents to [facility] for evaluation of transient neurological symptoms.     She presented to  hospital in [place] on 10/6/2020 with complaints of  L sided weakness, she was walking into her room and felt like she was “listing” to the left and ran into a wall, states she felt like the left side of her body was “not there”.  She didn't feel weak, she felt an odd sensation coming from her leg up and past her eye.    Blood pressure 141/71 on admission.  NIHSS 0 on admission, ECG on admission revealed NSR.    PMH includes remote history of atrial fibrillation s/p ablation in , , she has an intolerance to anticoagulation.  She had been having some trouble selecting words for months and saw a neurologist in [place] who felt there was mild cognitive decline,     She is in a study at [facility] for memory and aging.      Social History:  Retired infectious disease specialist, quit smoking at age 29, glass of wine with dinner a couple of times per week, avid athlete, ran marathons.     FH:  Sister- cardiomyopathy and Afib, brother- hypertrophic cardiomyopathy, sister- pancreatic cancer, brother -prostate cancer, father  in his 50s thought to be hypertrophic cardiomyopathy. Mother alzheimers.    Recommended 81mg of ASA at discharge.    Patient is here today with Milan, she has not had any repeat symptoms.  She is considering starting Eliquis 2.5mg BID, she is taking aspirin 81mg.   She had a 30 day heart monitor, results are pending. She does not want to have a Watchman due to need for large doses of anti-coagulation in the perioperative period.      Review of Systems   Constitutional: Negative for chills and fever.   HENT: Positive for hearing loss. Negative for tinnitus.    Eyes: Negative for blurred vision and double vision.   Respiratory: Negative for cough and shortness of breath.    Cardiovascular: Negative for  "chest pain and palpitations.   Gastrointestinal: Positive for nausea and vomiting. Negative for heartburn.        She has N&V with many drugs that she takes, this is why she could not tolerate AC.   Genitourinary: Negative for dysuria.   Musculoskeletal: Positive for myalgias.   Skin: Negative for rash.   Neurological: Positive for speech change. Negative for dizziness and headaches.   Endo/Heme/Allergies: Does not bruise/bleed easily.   Psychiatric/Behavioral: Negative for depression. The patient is not nervous/anxious.             Objective:     I personally reviewed imaging below and agree with the findings  MRI brain 10/6/2020  1  Moderate cerebral atrophy. Age-appropriate.  2.  Minimal supratentorial white matter disease most consistent with microvascular ischemic change.  3.  No evidence of acute infarction, hemorrhage, or mass lesion.  Carotid US 10/33106  Right carotid.    Flow velocities and Doppler waveforms are normal throughout the carotid system without evidence of plaque.    Subclavian and vertebral artery waveforms are antegrade and waveforms are normal in character and velocity.       Left carotid.    Very mild plaque of the carotid bifurcation. Doppler velocities are normal.    Plaque is irregular on the surface and homogeneous with calcific acoustic density.    Subclavian and vertebral artery waveforms are antegrade and waveforms are normal in character and velocity.    TTE:  LVEF 70%, diastolic function normal, LA size WNL, SHELL 21.    Stroke Labs:  A1C 5.7, LDL 81.    Encounter Vitals  Standard Vitals  Vitals  Blood Pressure : 102/60  Temperature: 36.6 °C (97.9 °F)  Temp src: Temporal  Pulse: 77  Respiration: 14  Pulse Oximetry: 97 %  Height: 170.2 cm (5' 7\")  Weight: 63.3 kg (139 lb 8.8 oz)  Encounter Vitals  Temperature: 36.6 °C (97.9 °F)  Temp src: Temporal  Blood Pressure : 102/60  Pulse: 77  Respiration: 14  Pulse Oximetry: 97 %  Weight: 63.3 kg (139 lb 8.8 oz)  Height: 170.2 cm (5' 7\")  BMI " (Calculated): 21.86        Physical Exam      Constitutional:  Alert, no apparent distress,  Psych:   mood and affect WNL  Neuro:  Oriented X 4, speech with slight delay in finding words, naming and memory intact  Muskuloskeletal:  Moves all extremities equally, strength 5/5 bilaterally  CN II: Visual fields are full to confrontation. Pupils are 3mm and briskly reactive to light.   CN III, IV, VI  EOMs intact, no ptosis  CN V: Facial sensation is intact to pinprick in all 3 divisions bilaterally. Corneal responses are intact.  CN VII: Face is symmetric with normal eye closure and smile.  CN VII: Hearing is normal to rubbing fingers  CN IX, X: Palate elevates symmetrically. Phonation is normal.  CN XI: Head turning and shoulder shrug are intact  CN XII: Tongue is midline with normal movements and no atrophy.              Strength 5/5 BUE/BLE, no drift                 Sensation to PP equal bilaterally                 No limb ataxia with finger to nose and heel to shin                 Ambulates with steady gait.                 Rhomberg negative                Biceps,brachioradialis, tricep, patellar and ankle reflex all 2+     Cardiovascular:    S1S2, no abnormal rhythm auscultated, no peripheral edema  Neck:                     No carotid bruits noted   Pulmonary:            Respirations easy, lungs clear to auscultation all fields.     Skin:                     No obvious rashes.       Iniital NIHSS  0      Current NIHSS    1a. LOC: 0  1b. LOC Questions: 0  1c. LOC Commands: 0  2. Best Gaze:0  3. Visual Fields: 0  4. Facial Paresis: 0  5a. Motor arm left: 0  5b. Motor arm right: 0  6a. Motor leg left: 0  6b. Motor leg right: 0  7. Sensory: 0  8. Best Language: 1  9. Limb Ataxia: 0  10. Dysarthria: 0  11. Extinction/Inattention: 0    Total Score Current  1        Current mRS 0       Assessment/Plan:     1. TIA (transient ischemic attack)  VS. Transient neurological symptoms.  Given history of atrial fibrillation,  symptoms are concerning for cortical TIA.    I would recommend 30 day heart monitoring at a minimum. She has an intolerance to anticoagulation.    ABCD2 Score 3  Age ?60 years   No = 0  Yes = +1 1   BP ? 140/90 mm/hg (initial either SBP ?140 or DBP ?90         No = 0     Yes = +1 1   Clinical Features of the TIA        Unilateral weakness +2        Speech disturbance w/o weakness +1        Other Symptoms   = 0 0   Durations of symptoms:        < 10 minutes    =    0         10-59 minutes =   +1         ?60 minutes    =    +2 1   History of diabetes              No   =     0               Yes =  +1 0   Total Score 3     2. History of atrial fibrillation, history of ablation,        See above 30 day heart monitor results pending, she is opposed to Watchman, she is working with her PCP and her cardiologist, she is considering Eliquis  2.5mg BID which may be adequate for her considering she is over 79 and not too far above 60KG.,   Recommend stopping ASA when starting Eliquis.    Follow up PRN.

## 2021-01-14 DIAGNOSIS — Z23 NEED FOR VACCINATION: ICD-10-CM

## 2021-08-03 ENCOUNTER — APPOINTMENT (RX ONLY)
Dept: URBAN - METROPOLITAN AREA CLINIC 38 | Facility: CLINIC | Age: 81
Setting detail: DERMATOLOGY
End: 2021-08-03

## 2021-08-03 DIAGNOSIS — D18.0 HEMANGIOMA: ICD-10-CM

## 2021-08-03 DIAGNOSIS — L82.1 OTHER SEBORRHEIC KERATOSIS: ICD-10-CM

## 2021-08-03 DIAGNOSIS — L81.4 OTHER MELANIN HYPERPIGMENTATION: ICD-10-CM

## 2021-08-03 DIAGNOSIS — L57.0 ACTINIC KERATOSIS: ICD-10-CM

## 2021-08-03 DIAGNOSIS — Z71.89 OTHER SPECIFIED COUNSELING: ICD-10-CM

## 2021-08-03 DIAGNOSIS — D22 MELANOCYTIC NEVI: ICD-10-CM

## 2021-08-03 PROBLEM — D22.9 MELANOCYTIC NEVI, UNSPECIFIED: Status: ACTIVE | Noted: 2021-08-03

## 2021-08-03 PROBLEM — D18.01 HEMANGIOMA OF SKIN AND SUBCUTANEOUS TISSUE: Status: ACTIVE | Noted: 2021-08-03

## 2021-08-03 PROCEDURE — ? COUNSELING

## 2021-08-03 PROCEDURE — ? LIQUID NITROGEN

## 2021-08-03 PROCEDURE — 99212 OFFICE O/P EST SF 10 MIN: CPT | Mod: 25

## 2021-08-03 PROCEDURE — 17000 DESTRUCT PREMALG LESION: CPT

## 2021-08-03 PROCEDURE — 17003 DESTRUCT PREMALG LES 2-14: CPT

## 2021-08-03 ASSESSMENT — LOCATION SIMPLE DESCRIPTION DERM
LOCATION SIMPLE: CHEST
LOCATION SIMPLE: ABDOMEN
LOCATION SIMPLE: UPPER BACK
LOCATION SIMPLE: RIGHT FOREARM
LOCATION SIMPLE: LEFT SHOULDER
LOCATION SIMPLE: LEFT UPPER BACK
LOCATION SIMPLE: LEFT FOREHEAD

## 2021-08-03 ASSESSMENT — LOCATION ZONE DERM
LOCATION ZONE: ARM
LOCATION ZONE: TRUNK
LOCATION ZONE: FACE

## 2021-08-03 ASSESSMENT — LOCATION DETAILED DESCRIPTION DERM
LOCATION DETAILED: LEFT MEDIAL SUPERIOR CHEST
LOCATION DETAILED: LEFT INFERIOR UPPER BACK
LOCATION DETAILED: RIGHT LATERAL ABDOMEN
LOCATION DETAILED: LEFT ANTERIOR SHOULDER
LOCATION DETAILED: LEFT SUPERIOR MEDIAL FOREHEAD
LOCATION DETAILED: INFERIOR THORACIC SPINE
LOCATION DETAILED: RIGHT VENTRAL PROXIMAL FOREARM
LOCATION DETAILED: RIGHT LATERAL SUPERIOR CHEST

## 2021-08-03 NOTE — PROCEDURE: LIQUID NITROGEN
Show Applicator Variable?: Yes
Render Note In Bullet Format When Appropriate: No
Consent: The patient's consent was obtained including but not limited to risks of crusting, scabbing, blistering, scarring, darker or lighter pigmentary change, recurrence, incomplete removal and infection.
Duration Of Freeze Thaw-Cycle (Seconds): 0
Post-Care Instructions: I reviewed with the patient in detail post-care instructions. Patient is to wear sunprotection, and avoid picking at any of the treated lesions. Pt may apply Vaseline to crusted or scabbing areas.
Detail Level: Detailed

## 2021-11-11 ENCOUNTER — TELEPHONE (OUTPATIENT)
Dept: CARDIOLOGY | Facility: MEDICAL CENTER | Age: 81
End: 2021-11-11

## 2021-11-11 NOTE — TELEPHONE ENCOUNTER
Spoke to patient in regards to records for NP appointment with Dr. Jesus on 01/07/2022 at 02:20pm. Patient used to se Dr. Egan at [facility] in [place], all recent records in epic including blood work, cardiac testing, and EKG. Confirmed appt date, time and location.    Patient states she had an ablation procedure done about 3-4 years ago with Dr. Egan, and also has a monitor placed at the moment for AFIB ordered from Dr. Rambo Mullins.    Pending records.

## 2021-12-13 ENCOUNTER — HOSPITAL ENCOUNTER (OUTPATIENT)
Dept: LAB | Facility: MEDICAL CENTER | Age: 81
End: 2021-12-13
Attending: NURSE PRACTITIONER
Payer: MEDICARE

## 2021-12-13 ENCOUNTER — OFFICE VISIT (OUTPATIENT)
Dept: NEUROLOGY | Facility: MEDICAL CENTER | Age: 81
End: 2021-12-13
Attending: NURSE PRACTITIONER
Payer: MEDICARE

## 2021-12-13 VITALS
WEIGHT: 141.09 LBS | SYSTOLIC BLOOD PRESSURE: 116 MMHG | DIASTOLIC BLOOD PRESSURE: 60 MMHG | HEART RATE: 55 BPM | RESPIRATION RATE: 12 BRPM | HEIGHT: 67 IN | BODY MASS INDEX: 22.15 KG/M2 | TEMPERATURE: 98.5 F | OXYGEN SATURATION: 94 %

## 2021-12-13 DIAGNOSIS — Z86.79 HISTORY OF ATRIAL FIBRILLATION: ICD-10-CM

## 2021-12-13 DIAGNOSIS — G45.9 TIA (TRANSIENT ISCHEMIC ATTACK): ICD-10-CM

## 2021-12-13 LAB
ANION GAP SERPL CALC-SCNC: 10 MMOL/L (ref 7–16)
BUN SERPL-MCNC: 20 MG/DL (ref 8–22)
CALCIUM SERPL-MCNC: 9.8 MG/DL (ref 8.5–10.5)
CHLORIDE SERPL-SCNC: 105 MMOL/L (ref 96–112)
CO2 SERPL-SCNC: 26 MMOL/L (ref 20–33)
CREAT SERPL-MCNC: 0.77 MG/DL (ref 0.5–1.4)
GLUCOSE SERPL-MCNC: 87 MG/DL (ref 65–99)
POTASSIUM SERPL-SCNC: 4.4 MMOL/L (ref 3.6–5.5)
SODIUM SERPL-SCNC: 141 MMOL/L (ref 135–145)

## 2021-12-13 PROCEDURE — 36415 COLL VENOUS BLD VENIPUNCTURE: CPT

## 2021-12-13 PROCEDURE — 99215 OFFICE O/P EST HI 40 MIN: CPT | Performed by: NURSE PRACTITIONER

## 2021-12-13 PROCEDURE — 99212 OFFICE O/P EST SF 10 MIN: CPT | Performed by: NURSE PRACTITIONER

## 2021-12-13 PROCEDURE — 80048 BASIC METABOLIC PNL TOTAL CA: CPT

## 2021-12-13 RX ORDER — METOPROLOL SUCCINATE 50 MG/1
50 TABLET, EXTENDED RELEASE ORAL DAILY
COMMUNITY
Start: 2021-11-30 | End: 2022-01-07 | Stop reason: SDUPTHER

## 2021-12-13 RX ORDER — CLONAZEPAM 0.5 MG/1
0.5 TABLET ORAL
Status: ON HOLD | COMMUNITY
Start: 2021-09-23 | End: 2022-08-09

## 2021-12-13 ASSESSMENT — ENCOUNTER SYMPTOMS
NAUSEA: 0
FOCAL WEAKNESS: 0
FEVER: 0
BACK PAIN: 0
NERVOUS/ANXIOUS: 1
HEARTBURN: 0
HEADACHES: 0
BRUISES/BLEEDS EASILY: 0
SENSORY CHANGE: 0
TINGLING: 0
DIZZINESS: 0
PALPITATIONS: 1
SPEECH CHANGE: 1
SHORTNESS OF BREATH: 1
DEPRESSION: 0
NECK PAIN: 0

## 2021-12-13 ASSESSMENT — PATIENT HEALTH QUESTIONNAIRE - PHQ9: CLINICAL INTERPRETATION OF PHQ2 SCORE: 0

## 2021-12-13 NOTE — PROGRESS NOTES
Subjective      HPI  Gloria Herndon is a 81  y.o. right handed female who presents to [facility] for follow up.    I saw her on 10/28/2020 for transient neurological symptoms.     The symptoms occurred on 10/6/2020, she was seen at the hospital in [place] with  L sided weakness, she had been walking into her room and felt like she was “listing” to the left and ran into a wall,  she felt like the left side of her body was “not there”.  She deniedl weakness, she felt an odd sensation coming from her leg up and past her eye.    Blood pressure 141/71 on admission.  NIHSS 0 on admission, ECG on admission revealed NSR.     PMH includes remote history of atrial fibrillation s/p ablation in , , she has an intolerance to anticoagulation, states she bled very easily and was sick to her stomach when taking Eliquis 5mg BID.       She is in a study at [facility] for memory and aging., she has some ongoing difficulty with speech problems.        Social History:  Retired infectious disease specialist, quit smoking at age 29, glass of wine with dinner a couple of times per week, avid athlete, ran marathons.      FH:  Sister- cardiomyopathy and Afib, brother- hypertrophic cardiomyopathy, sister- pancreatic cancer, brother -prostate cancer, father  in his 50s thought to be hypertrophic cardiomyopathy. Mother alzheimers.     Recommended 81mg of ASA at discharge.    She is here today with her S.O.  She has had episodes of atrial fibrillation since she was here last, states one day recently heart rate was in 190s..  She got a referral to see cardiology here, she had an appointment with cardiology in [place] who recommended she start anti-coagulation, she came back here because I recommended anticoagulation at last visit..   She did complete a 2 week heart monitor, she didn't get the results.        Review of Systems   Constitutional: Negative for fever.   HENT: Negative for nosebleeds.    Respiratory:  "Positive for shortness of breath.    Cardiovascular: Positive for palpitations.   Gastrointestinal: Negative for heartburn and nausea.   Musculoskeletal: Negative for back pain and neck pain.   Skin: Negative for rash.   Neurological: Positive for speech change. Negative for dizziness, tingling, sensory change, focal weakness and headaches.   Endo/Heme/Allergies: Does not bruise/bleed easily.   Psychiatric/Behavioral: Negative for depression. The patient is nervous/anxious.           Objective      MRI brain 10/6/2020  1  Moderate cerebral atrophy. Age-appropriate.  2.  Minimal supratentorial white matter disease most consistent with microvascular ischemic change.  3.  No evidence of acute infarction, hemorrhage, or mass lesion.  Carotid US 10/55006  Right carotid.    Flow velocities and Doppler waveforms are normal throughout the carotid system without evidence of plaque.    Subclavian and vertebral artery waveforms are antegrade and waveforms are normal in character and velocity.       Left carotid.    Very mild plaque of the carotid bifurcation. Doppler velocities are normal.    Plaque is irregular on the surface and homogeneous with calcific acoustic density.    Subclavian and vertebral artery waveforms are antegrade and waveforms are normal in character and velocity.     TTE:  LVEF 70%, diastolic function normal, LA size WNL, SHELL 21.     Stroke Labs:  A1C 5.7, LDL 81.       Encounter Vitals  Standard Vitals  Vitals  Blood Pressure : 116/60  Temperature: 36.9 °C (98.5 °F)  Temp src: Temporal  Pulse: (!) 55  Respiration: 12  Pulse Oximetry: 94 %  Height: 170.2 cm (5' 7\")  Weight: 64 kg (141 lb 1.5 oz)  Encounter Vitals  Temperature: 36.9 °C (98.5 °F)  Temp src: Temporal  Blood Pressure : 116/60  Pulse: (!) 55  Respiration: 12  Pulse Oximetry: 94 %  Weight: 64 kg (141 lb 1.5 oz)  Height: 170.2 cm (5' 7\")  BMI (Calculated): 22.1      PHYSICAL ASSESSMENT  Constitutional:  Alert, no apparent distress,  Psych:   mood " and affect WNL  Muskuloskeletal:  Moves all extremities equally, strength 5/5 bilaterally, no drift  NEUROLOGICAL ASSESSMENT  Oriented X 4, speech fluent, naming and memory intact  CN II: Visual fields are full to confrontation. Fundoscopic exam is normal with sharp discs and no vascular changes. Pupils are 3 mm and briskly reactive to light.   CN III: IV, VI  EOMs intact, no ptosis  CN V: Facial sensation is intact to pinprick in all 3 divisions bilaterally. Corneal responses are intact.  CN VII: Face is symmetric with normal eye closure and smile.  CN VIII Hearing is normal to rubbing fingers  CN IX, X: Palate elevates symmetrically. Phonation is normal.  CN XI: Head turning and shoulder shrug are intact  CN XII: Tongue is midline with normal movements and no atrophy.                           Sensation to PP equal bilaterally                 No limb ataxia with finger to nose and heel to shin                 Ambulates with steady gait.                           Cardiovascular:    S1S2, no abnormal rhythm auscultated, no peripheral edema  Neck:                     No carotid bruits noted   Pulmonary:            Respirations easy, lungs clear to auscultation all fields.     Skin:                     No obvious rashes.          Assessment & Plan     1. TIA (transient ischemic attack)  VS. Transient neurological symptoms.  Given history of atrial fibrillation, symptoms are likely a cortical based TIA.          ABCD2 Score 3  Age ?60 years   No = 0  Yes = +1 1   BP ? 140/90 mm/hg (initial either SBP ?140 or DBP ?90         No = 0     Yes = +1 1   Clinical Features of the TIA        Unilateral weakness +2        Speech disturbance w/o weakness +1        Other Symptoms   = 0 0   Durations of symptoms:        < 10 minutes    =    0         10-59 minutes =   +1         ?60 minutes    =    +2 1   History of diabetes              No   =     0               Yes =  +1 0   Total Score 3      2. History of atrial fibrillation,  history of ablation,  With recent episode of Afib with RVR.         She did not tolerate Eliquis in the past, she wants to try Xarelto, she does not have a current BMP, will check BMP and prescribe Xarelto. , we will let Dr. Jesus handle it from there.  Stressed importance of taking Xarelto with food.     She is over 79 and her weight is close to 60kg (it was 64 today with boots and winter clothes on), we could consider Eliquis 2.5mg BID as she is near the threshold between the 5mg and the 2.5mg dose and had a history of bleeding and side effects on the 5mg.  At any rate, I will prescribe the Xarelto once I get her labs and calculate her creat. Clearance.   She will follow up with cardiology for refills and monitoring.      I spent a total of 53 minutes caring for patient,  my time includes counseling, review of systems, HPI and assessment, review of images, labs and testing as above.  I reviewed the hospital records, PMH, social and family history.   I have counseled patient on stroke prevention strategies, stroke symptoms and mimics.  Diet and exercise modifications.  We discussed medication side effects and instructions.        Follow up PRN.    Will call with dose [phone]      Creat Clearance = 57, will proceed with Xarelto 20mg.

## 2021-12-14 DIAGNOSIS — Z86.79 HISTORY OF ATRIAL FIBRILLATION: ICD-10-CM

## 2022-01-07 ENCOUNTER — OFFICE VISIT (OUTPATIENT)
Dept: CARDIOLOGY | Facility: MEDICAL CENTER | Age: 82
End: 2022-01-07
Payer: MEDICARE

## 2022-01-07 VITALS
RESPIRATION RATE: 14 BRPM | SYSTOLIC BLOOD PRESSURE: 108 MMHG | OXYGEN SATURATION: 97 % | HEIGHT: 67 IN | DIASTOLIC BLOOD PRESSURE: 72 MMHG | BODY MASS INDEX: 21.99 KG/M2 | HEART RATE: 60 BPM | WEIGHT: 140.1 LBS

## 2022-01-07 DIAGNOSIS — E78.5 DYSLIPIDEMIA: ICD-10-CM

## 2022-01-07 DIAGNOSIS — I48.0 PAF (PAROXYSMAL ATRIAL FIBRILLATION) (HCC): ICD-10-CM

## 2022-01-07 DIAGNOSIS — G45.9 TIA (TRANSIENT ISCHEMIC ATTACK): ICD-10-CM

## 2022-01-07 DIAGNOSIS — Z82.49 FAMILY HISTORY OF HYPERTROPHIC CARDIOMYOPATHY: ICD-10-CM

## 2022-01-07 LAB — EKG IMPRESSION: NORMAL

## 2022-01-07 PROCEDURE — 93000 ELECTROCARDIOGRAM COMPLETE: CPT | Performed by: INTERNAL MEDICINE

## 2022-01-07 PROCEDURE — 99204 OFFICE O/P NEW MOD 45 MIN: CPT | Performed by: INTERNAL MEDICINE

## 2022-01-07 RX ORDER — METOPROLOL SUCCINATE 50 MG/1
25 TABLET, EXTENDED RELEASE ORAL DAILY
Qty: 90 TABLET | Refills: 3 | Status: SHIPPED | OUTPATIENT
Start: 2022-01-07 | End: 2022-05-31 | Stop reason: SDUPTHER

## 2022-01-07 NOTE — PROGRESS NOTES
"CARDIOLOGY OUTPATIENT FOLLOWUP    PCP: Rambo Mullins M.D.    1. PAF (paroxysmal atrial fibrillation) ([facility])    2. Dyslipidemia    3. Family history of hypertrophic cardiomyopathy    4. TIA (transient ischemic attack)        Gloria Herndon is doing well with rare, paroxysmal atrial fibrillation.  I recommended she reduce metoprolol to 25 mg daily to minimize side effects and she continue with Xarelto.  At this juncture I do not think watchman device would be appropriate.    Follow up: 1 year    Chief Complaint   Patient presents with   • Atrial Fibrillation     NP Dx: History of atrial fibrillation   • Dyslipidemia       History: Gloria Herndon is a 81 y.o. female with a family history of hypertrophic cardiomyopathy as well as a personal history of atrial fibrillation and dyslipidemia as well as TIA presenting for evaluation.  She had been previously cared for in [place] before relocating to [place].  Several months back she had a bad bout of rapid ventricular response and has since been started on metoprolol and Xarelto.  The metoprolol is producing side effects of fatigue, sleepiness.  Fortunately Xarelto has been well-tolerated.  In the past other anticoagulants were poorly tolerated.    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       ROS:   All other systems reviewed and negative except as per the HPI    PE:  /72 (BP Location: Left arm, Patient Position: Sitting, BP Cuff Size: Adult)   Pulse 60   Resp 14   Ht 1.702 m (5' 7\")   Wt 63.5 kg (140 lb 1.6 oz)   SpO2 97%   BMI 21.94 kg/m²   Gen: no acute distress  HEENT: Symmetric face. Anicteric sclerae. Moist mucus membranes  NECK: No JVD. No lymphadenopathy  CARDIAC: Regular, Normal S1, S2, No murmur  VASCULATURE: carotids are normal bilaterally without bruit  RESP: Clear to auscultation bilaterally  ABD: Soft, non-tender, non-distended  EXT: No edema, no clubbing or " cyanosis  SKIN: Warm and dry  NEURO: No gross deficits  PSYCH: Appropriate affect, participates in conversation    The ASCVD Risk score ([place] DC Jr, et al., 2013) failed to calculate.    Past Medical History:   Diagnosis Date   • Arrhythmia     Pt states she has Atrial Fib     Allergies   Allergen Reactions   • Iodine      Other reaction(s): Other (See Comments)  unsure   • Rivaroxaban Nausea     joint pain, fatigue and weakness   • Blue Dyes Unspecified     GREEN DYE IS A PROBLEM TOO as per pt   • Ethyl Alcohol, Skin    • Iodine Solution [Povidone Iodine]      Red spots, pain   • Kdc:Yellow Dye+Ascorbate+Biotin+Calcium Phosphate+Chloride+...    • Omeprazole Unspecified     Urinary incontinence    • Tape      Outpatient Encounter Medications as of 1/7/2022   Medication Sig Dispense Refill   • rivaroxaban (XARELTO) 20 MG Tab tablet Take 1 Tablet by mouth with dinner. 90 Tablet 3   • metoprolol SR (TOPROL XL) 50 MG TABLET SR 24 HR Take 0.5 Tablets by mouth every day. 90 Tablet 3   • clonazePAM (KLONOPIN) 0.5 MG Tab TAKE 1 TABLET BY MOUTH EVERY MORNING AND EACH EVENING AS DIRECTED FOR 30 DAYS MINIMUM [id number]     • acyclovir (ZOVIRAX) 400 MG tablet      • Evolocumab, REPATHA, 140 MG/ML Solution Auto-injector Inject  as instructed.     • methylphenidate (RITALIN) 20 MG tablet      • vitamin D, Ergocalciferol, (DRISDOL) 1.25 MG (02654 UT) Cap capsule Take 50,000 Units by mouth.     • estrogens, conjugated (PREMARIN) 0.3 MG Tab      • [DISCONTINUED] rivaroxaban (XARELTO) 20 MG Tab tablet Take 1 Tablet by mouth with dinner. 90 Tablet 1   • [DISCONTINUED] metoprolol SR (TOPROL XL) 50 MG TABLET SR 24 HR Take 50 mg by mouth every day.       No facility-administered encounter medications on file as of 1/7/2022.     Social History     Socioeconomic History   • Marital status: Single     Spouse name: Not on file   • Number of children: Not on file   • Years of education: Not on file   • Highest education level: Not on  file   Occupational History   • Not on file   Tobacco Use   • Smoking status: Never Smoker   • Smokeless tobacco: Never Used   Vaping Use   • Vaping Use: Never used   Substance and Sexual Activity   • Alcohol use: Yes     Comment: very rare glass of wine   • Drug use: Not Currently   • Sexual activity: Not on file   Other Topics Concern   • Not on file   Social History Narrative   • Not on file     Social Determinants of Health     Financial Resource Strain:    • Difficulty of Paying Living Expenses: Not on file   Food Insecurity:    • Worried About Running Out of Food in the Last Year: Not on file   • Ran Out of Food in the Last Year: Not on file   Transportation Needs:    • Lack of Transportation (Medical): Not on file   • Lack of Transportation (Non-Medical): Not on file   Physical Activity:    • Days of Exercise per Week: Not on file   • Minutes of Exercise per Session: Not on file   Stress:    • Feeling of Stress : Not on file   Social Connections:    • Frequency of Communication with Friends and Family: Not on file   • Frequency of Social Gatherings with Friends and Family: Not on file   • Attends Anabaptist Services: Not on file   • Active Member of Clubs or Organizations: Not on file   • Attends Club or Organization Meetings: Not on file   • Marital Status: Not on file   Intimate Partner Violence:    • Fear of Current or Ex-Partner: Not on file   • Emotionally Abused: Not on file   • Physically Abused: Not on file   • Sexually Abused: Not on file   Housing Stability:    • Unable to Pay for Housing in the Last Year: Not on file   • Number of Places Lived in the Last Year: Not on file   • Unstable Housing in the Last Year: Not on file       Studies  Lab Results   Component Value Date/Time    CHOLSTRLTOT 179 10/07/2020 05:50 AM    LDL 81 10/07/2020 05:50 AM    HDL 81 10/07/2020 05:50 AM    TRIGLYCERIDE 87 10/07/2020 05:50 AM       Lab Results   Component Value Date/Time    SODIUM 141 12/13/2021 11:52 AM     POTASSIUM 4.4 12/13/2021 11:52 AM    CHLORIDE 105 12/13/2021 11:52 AM    CO2 26 12/13/2021 11:52 AM    GLUCOSE 87 12/13/2021 11:52 AM    BUN 20 12/13/2021 11:52 AM    CREATININE 0.77 12/13/2021 11:52 AM     No results found for: ALKPHOSPHAT, ASTSGOT, ALTSGPT, TBILIRUBIN     For this encounter I reviewed the following medical records PCP notes, BMP and LFT

## 2022-01-24 DIAGNOSIS — I48.0 PAF (PAROXYSMAL ATRIAL FIBRILLATION) (HCC): ICD-10-CM

## 2022-01-25 ENCOUNTER — TELEPHONE (OUTPATIENT)
Dept: CARDIOLOGY | Facility: MEDICAL CENTER | Age: 82
End: 2022-01-25

## 2022-01-25 NOTE — TELEPHONE ENCOUNTER
BE    Pt is needing a short script of rivaroxaban (XARELTO) 20 MG Tab tablet [facility] [place] [facility] the meantime of the ones coming from the mail order.    Thank you,  Pam BHAT

## 2022-05-30 ENCOUNTER — TELEPHONE (OUTPATIENT)
Dept: CARDIOLOGY | Facility: MEDICAL CENTER | Age: 82
End: 2022-05-30
Payer: MEDICARE

## 2022-05-30 DIAGNOSIS — Z86.79 HISTORY OF ATRIAL FIBRILLATION: ICD-10-CM

## 2022-05-30 DIAGNOSIS — E78.5 DYSLIPIDEMIA: ICD-10-CM

## 2022-05-30 DIAGNOSIS — Z82.49 FAMILY HISTORY OF HEART DISEASE: ICD-10-CM

## 2022-05-30 DIAGNOSIS — G45.9 TIA (TRANSIENT ISCHEMIC ATTACK): ICD-10-CM

## 2022-05-30 NOTE — TELEPHONE ENCOUNTER
Received page from answering last regarding patient in A. Fib (via smartwatch and BP cuff) since 5/26 and increased her metoprolol to a whole tablet 50 mg daily with no improvement.    Currently, patient reports increased SOB associated with return of afib  HR . /90. Patient endorses compliance with xarelto.     We discussed ER precautions; patient verbalizes understanding. We will also have patient increase her metoprolol to 50 mg BID. Patient to follow-up in our office tomorrow with MICHELL Forrester for further evaluation and recommendations.    Future Appointments   Date Time Provider Department Center   5/31/2022  3:45 PM Hamida Celis P.A.-C. [facility] None

## 2022-05-31 ENCOUNTER — OFFICE VISIT (OUTPATIENT)
Dept: CARDIOLOGY | Facility: MEDICAL CENTER | Age: 82
End: 2022-05-31
Payer: MEDICARE

## 2022-05-31 VITALS
BODY MASS INDEX: 21.82 KG/M2 | DIASTOLIC BLOOD PRESSURE: 68 MMHG | SYSTOLIC BLOOD PRESSURE: 94 MMHG | HEIGHT: 67 IN | HEART RATE: 54 BPM | WEIGHT: 139 LBS | OXYGEN SATURATION: 95 % | RESPIRATION RATE: 16 BRPM

## 2022-05-31 DIAGNOSIS — Z82.49 FAMILY HISTORY OF HEART DISEASE: ICD-10-CM

## 2022-05-31 DIAGNOSIS — I48.0 PAF (PAROXYSMAL ATRIAL FIBRILLATION) (HCC): ICD-10-CM

## 2022-05-31 DIAGNOSIS — R00.2 PALPITATIONS: ICD-10-CM

## 2022-05-31 LAB — EKG IMPRESSION: NORMAL

## 2022-05-31 PROCEDURE — 93000 ELECTROCARDIOGRAM COMPLETE: CPT | Performed by: INTERNAL MEDICINE

## 2022-05-31 PROCEDURE — 99214 OFFICE O/P EST MOD 30 MIN: CPT | Performed by: PHYSICIAN ASSISTANT

## 2022-05-31 RX ORDER — METOPROLOL SUCCINATE 50 MG/1
50 TABLET, EXTENDED RELEASE ORAL DAILY
Qty: 90 TABLET | Refills: 3 | Status: SHIPPED | OUTPATIENT
Start: 2022-05-31 | End: 2022-07-14 | Stop reason: SDUPTHER

## 2022-05-31 ASSESSMENT — ENCOUNTER SYMPTOMS
DIZZINESS: 0
FALLS: 0
PALPITATIONS: 0
PND: 0
ORTHOPNEA: 0
SHORTNESS OF BREATH: 0
LOSS OF CONSCIOUSNESS: 0
BLOOD IN STOOL: 0

## 2022-05-31 NOTE — PATIENT INSTRUCTIONS
Take Metoprolol 50mg once a day. You may increase to twice a day if you have more atrial fibrillation attacks.

## 2022-05-31 NOTE — PROGRESS NOTES
Chief Complaint   Patient presents with   • Atrial Fibrillation     Follow up            Subjective:   Gloria Herndon is a 81 y.o. female who presents today for follow-up with her  [place].    Patient of Dr. Jesus.  Current medical problems include PAF on anticoagulation, s/p AF ablation December 2017, dyslipidemia, family history of hypertrophic cardiomyopathy without personal history.    Gloria comes in today because she had a episode of atrial fibrillation.  She first noticed it on Friday 5/27, her Apple Watch notified her of the fast heart rate and she could feel palpitations.  Her heart rates were 100-160s.  She felt short of breath and very dizzy when moving from sitting to standing.  The A. fib episode lasted through Monday when she called our office and was told to increase her metoprolol to 50 mg twice daily.  She went to bed last night and at some point converted to sinus rhythm, she has been in sinus rhythm since.  Now that she is back in her normal rhythm she does not have any shortness of breath, orthopnea or orthostatic symptoms.    She remained a fib free for about 3 years following her AF ablation and then started developing brief paroxysms, this is the longest episode she is experienced.  She has no interest in repeating the atrial fibrillation ablation.  She is leery of taking additional medications because she has so many side effects from them.    Gloria does not have any thyroid problems.  Ritalin is on her medication list but she uses this very rarely, none recently or surrounding her A. fib episodes.    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    Past Medical History:   Diagnosis Date   • Arrhythmia     Pt states she has Atrial Fib       No past surgical history on file.      No family history on file.      Social History     Tobacco Use   Smoking Status Never Smoker   Smokeless Tobacco Never Used          Social History     Substance and Sexual Activity   Alcohol  Use Yes    Comment: very rare glass of wine         Allergies   Allergen Reactions   • Iodine      Other reaction(s): Other (See Comments)  unsure   • Rivaroxaban Nausea     joint pain, fatigue and weakness   • Blue Dyes Unspecified     GREEN DYE IS A PROBLEM TOO as per pt   • Ethyl Alcohol, Skin    • Iodine Solution [Povidone Iodine]      Red spots, pain   • Kdc:Yellow Dye+Ascorbate+Biotin+Calcium Phosphate+Chloride+...    • Omeprazole Unspecified     Urinary incontinence    • Tape          Outpatient Encounter Medications as of 5/31/2022   Medication Sig Dispense Refill   • VITAMIN D, CHOLECALCIFEROL, PO Take  by mouth every 48 hours.     • metoprolol SR (TOPROL XL) 50 MG TABLET SR 24 HR Take 1 Tablet by mouth every day. 90 Tablet 3   • rivaroxaban (XARELTO) 20 MG Tab tablet Take 1 Tablet by mouth with dinner. 90 Tablet 3   • clonazePAM (KLONOPIN) 0.5 MG Tab as needed.     • acyclovir (ZOVIRAX) 400 MG tablet      • Evolocumab, REPATHA, 140 MG/ML Solution Auto-injector Inject  under the skin Q30 DAYS.     • methylphenidate (RITALIN) 20 MG tablet as needed.     • estrogens, conjugated (PREMARIN) 0.3 MG Tab      • [DISCONTINUED] rivaroxaban (XARELTO) 20 MG Tab tablet Take 1 Tablet by mouth with dinner. 14 Tablet 0   • [DISCONTINUED] metoprolol SR (TOPROL XL) 50 MG TABLET SR 24 HR Take 0.5 Tablets by mouth every day. 90 Tablet 3   • [DISCONTINUED] vitamin D, Ergocalciferol, (DRISDOL) 1.25 MG (33292 UT) Cap capsule Take 50,000 Units by mouth. (Patient not taking: Reported on 5/31/2022)       No facility-administered encounter medications on file as of 5/31/2022.         Review of Systems   Constitutional: Negative for malaise/fatigue.   HENT: Negative for nosebleeds.    Respiratory: Negative for shortness of breath.    Cardiovascular: Negative for chest pain, palpitations, orthopnea, leg swelling and PND.   Gastrointestinal: Negative for blood in stool and melena.   Genitourinary: Negative for hematuria.  "  Musculoskeletal: Negative for falls.   Neurological: Negative for dizziness and loss of consciousness.          Objective:   BP (!) 94/68 (BP Location: Left arm, Patient Position: Sitting)   Pulse (!) 54   Resp 16   Ht 1.702 m (5' 7\")   Wt 63 kg (139 lb)   SpO2 95%   BMI 21.77 kg/m²        Physical Exam  Vitals and nursing note reviewed.   Constitutional:       General: She is not in acute distress.     Appearance: Normal appearance.   HENT:      Head: Normocephalic and atraumatic.      Mouth/Throat:      Mouth: Mucous membranes are moist.   Eyes:      General: No scleral icterus.  Cardiovascular:      Rate and Rhythm: Normal rate and regular rhythm.      Pulses: Normal pulses.      Heart sounds: No murmur heard.  Pulmonary:      Effort: Pulmonary effort is normal. No respiratory distress.      Breath sounds: Normal breath sounds. No rales.   Abdominal:      General: There is no distension.      Palpations: Abdomen is soft.   Musculoskeletal:      Right lower leg: No edema.      Left lower leg: No edema.   Skin:     General: Skin is warm and dry.      Capillary Refill: Capillary refill takes less than 2 seconds.   Neurological:      General: No focal deficit present.      Mental Status: She is alert and oriented to person, place, and time.   Psychiatric:         Judgment: Judgment normal.          TTE 10/2020  CONCLUSIONS  No prior study is available for comparison.   Left ventricular ejection fraction is visually estimated to be 70%.  Estimated right ventricular systolic pressure is 25 mmHg.    Apple watch tracing in media: AF with rate 120    EKG 5/31/2022: Sinus bradycardia, early R/S transition  Assessment:     1. PAF (paroxysmal atrial fibrillation) ([facility])  EKG   2. Palpitations  EKG   3. Family history of heart disease          Medical Decision Making:  Today's Assessment / Plan:   Paroxymal atrial fibrillation  -Most recent episode with RVR and lasted 3 days, ultimately spontaneously converted, now in " sinus bradycardia  -She is not interested in repeat ablation.  She would like to avoid addition of antiarrhythmic medications.  We agreed to continue metoprolol at a 50 mg once daily, increased to 50 twice daily for recurrent A. fib.  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  For now we will stick with rate control and monitor her symptoms, she uses an apple watch for heart rate monitoring.    - [id number] 5 (age, gender, TIA).  Continue Xarelto 20 mg    She requests to see her cardiologist Dr. Jesus in 3 months with whom she has a good relationship and would like to discuss any rhythm control options with him prior to deciding.

## 2022-06-23 ENCOUNTER — APPOINTMENT (OUTPATIENT)
Dept: RADIOLOGY | Facility: MEDICAL CENTER | Age: 82
End: 2022-06-23
Attending: STUDENT IN AN ORGANIZED HEALTH CARE EDUCATION/TRAINING PROGRAM
Payer: MEDICARE

## 2022-06-23 ENCOUNTER — HOSPITAL ENCOUNTER (EMERGENCY)
Facility: MEDICAL CENTER | Age: 82
End: 2022-06-23
Attending: STUDENT IN AN ORGANIZED HEALTH CARE EDUCATION/TRAINING PROGRAM
Payer: MEDICARE

## 2022-06-23 VITALS
WEIGHT: 137 LBS | HEART RATE: 68 BPM | OXYGEN SATURATION: 93 % | BODY MASS INDEX: 21.5 KG/M2 | DIASTOLIC BLOOD PRESSURE: 55 MMHG | HEIGHT: 67 IN | RESPIRATION RATE: 32 BRPM | SYSTOLIC BLOOD PRESSURE: 91 MMHG | TEMPERATURE: 99.1 F

## 2022-06-23 DIAGNOSIS — I48.91 ATRIAL FIBRILLATION, UNSPECIFIED TYPE (HCC): ICD-10-CM

## 2022-06-23 DIAGNOSIS — U07.1 COVID-19: ICD-10-CM

## 2022-06-23 LAB
ALBUMIN SERPL BCP-MCNC: 3.8 G/DL (ref 3.2–4.9)
ALBUMIN/GLOB SERPL: 1.2 G/DL
ALP SERPL-CCNC: 125 U/L (ref 30–99)
ALT SERPL-CCNC: 30 U/L (ref 2–50)
ANION GAP SERPL CALC-SCNC: 14 MMOL/L (ref 7–16)
AST SERPL-CCNC: 46 U/L (ref 12–45)
BASOPHILS # BLD AUTO: 0.2 % (ref 0–1.8)
BASOPHILS # BLD: 0.02 K/UL (ref 0–0.12)
BILIRUB SERPL-MCNC: 0.7 MG/DL (ref 0.1–1.5)
BUN SERPL-MCNC: 20 MG/DL (ref 8–22)
CALCIUM SERPL-MCNC: 9.5 MG/DL (ref 8.4–10.2)
CHLORIDE SERPL-SCNC: 101 MMOL/L (ref 96–112)
CO2 SERPL-SCNC: 21 MMOL/L (ref 20–33)
CREAT SERPL-MCNC: 0.83 MG/DL (ref 0.5–1.4)
EKG IMPRESSION: NORMAL
EKG IMPRESSION: NORMAL
EOSINOPHIL # BLD AUTO: 0.01 K/UL (ref 0–0.51)
EOSINOPHIL NFR BLD: 0.1 % (ref 0–6.9)
ERYTHROCYTE [DISTWIDTH] IN BLOOD BY AUTOMATED COUNT: 39.6 FL (ref 35.9–50)
FLUAV RNA SPEC QL NAA+PROBE: NEGATIVE
FLUBV RNA SPEC QL NAA+PROBE: NEGATIVE
GFR SERPLBLD CREATININE-BSD FMLA CKD-EPI: 70 ML/MIN/1.73 M 2
GLOBULIN SER CALC-MCNC: 3.2 G/DL (ref 1.9–3.5)
GLUCOSE SERPL-MCNC: 111 MG/DL (ref 65–99)
HCT VFR BLD AUTO: 42 % (ref 37–47)
HGB BLD-MCNC: 14.3 G/DL (ref 12–16)
IMM GRANULOCYTES # BLD AUTO: 0.02 K/UL (ref 0–0.11)
IMM GRANULOCYTES NFR BLD AUTO: 0.2 % (ref 0–0.9)
LYMPHOCYTES # BLD AUTO: 2.04 K/UL (ref 1–4.8)
LYMPHOCYTES NFR BLD: 23.5 % (ref 22–41)
MCH RBC QN AUTO: 30.2 PG (ref 27–33)
MCHC RBC AUTO-ENTMCNC: 34 G/DL (ref 33.6–35)
MCV RBC AUTO: 88.8 FL (ref 81.4–97.8)
MONOCYTES # BLD AUTO: 0.79 K/UL (ref 0–0.85)
MONOCYTES NFR BLD AUTO: 9.1 % (ref 0–13.4)
NEUTROPHILS # BLD AUTO: 5.81 K/UL (ref 2–7.15)
NEUTROPHILS NFR BLD: 66.9 % (ref 44–72)
NRBC # BLD AUTO: 0 K/UL
NRBC BLD-RTO: 0 /100 WBC
PLATELET # BLD AUTO: 261 K/UL (ref 164–446)
PMV BLD AUTO: 10.6 FL (ref 9–12.9)
POTASSIUM SERPL-SCNC: 4.4 MMOL/L (ref 3.6–5.5)
PROT SERPL-MCNC: 7 G/DL (ref 6–8.2)
RBC # BLD AUTO: 4.73 M/UL (ref 4.2–5.4)
RSV RNA SPEC QL NAA+PROBE: NEGATIVE
SARS-COV-2 RNA RESP QL NAA+PROBE: DETECTED
SODIUM SERPL-SCNC: 136 MMOL/L (ref 135–145)
SPECIMEN SOURCE: ABNORMAL
TROPONIN T SERPL-MCNC: 10 NG/L (ref 6–19)
WBC # BLD AUTO: 8.7 K/UL (ref 4.8–10.8)

## 2022-06-23 PROCEDURE — 96374 THER/PROPH/DIAG INJ IV PUSH: CPT | Mod: XU

## 2022-06-23 PROCEDURE — 84484 ASSAY OF TROPONIN QUANT: CPT

## 2022-06-23 PROCEDURE — 36415 COLL VENOUS BLD VENIPUNCTURE: CPT

## 2022-06-23 PROCEDURE — 80053 COMPREHEN METABOLIC PANEL: CPT

## 2022-06-23 PROCEDURE — 85025 COMPLETE CBC W/AUTO DIFF WBC: CPT

## 2022-06-23 PROCEDURE — 700101 HCHG RX REV CODE 250: Performed by: STUDENT IN AN ORGANIZED HEALTH CARE EDUCATION/TRAINING PROGRAM

## 2022-06-23 PROCEDURE — 71250 CT THORAX DX C-: CPT | Mod: ME

## 2022-06-23 PROCEDURE — 71045 X-RAY EXAM CHEST 1 VIEW: CPT

## 2022-06-23 PROCEDURE — 92960 CARDIOVERSION ELECTRIC EXT: CPT

## 2022-06-23 PROCEDURE — 99285 EMERGENCY DEPT VISIT HI MDM: CPT

## 2022-06-23 PROCEDURE — 96375 TX/PRO/DX INJ NEW DRUG ADDON: CPT | Mod: XU

## 2022-06-23 PROCEDURE — 700105 HCHG RX REV CODE 258: Performed by: STUDENT IN AN ORGANIZED HEALTH CARE EDUCATION/TRAINING PROGRAM

## 2022-06-23 PROCEDURE — 93005 ELECTROCARDIOGRAM TRACING: CPT | Performed by: STUDENT IN AN ORGANIZED HEALTH CARE EDUCATION/TRAINING PROGRAM

## 2022-06-23 PROCEDURE — 93005 ELECTROCARDIOGRAM TRACING: CPT

## 2022-06-23 PROCEDURE — C9803 HOPD COVID-19 SPEC COLLECT: HCPCS | Performed by: STUDENT IN AN ORGANIZED HEALTH CARE EDUCATION/TRAINING PROGRAM

## 2022-06-23 PROCEDURE — 700111 HCHG RX REV CODE 636 W/ 250 OVERRIDE (IP): Performed by: STUDENT IN AN ORGANIZED HEALTH CARE EDUCATION/TRAINING PROGRAM

## 2022-06-23 PROCEDURE — 0241U HCHG SARS-COV-2 COVID-19 NFCT DS RESP RNA 4 TRGT MIC: CPT

## 2022-06-23 RX ORDER — SODIUM CHLORIDE 9 MG/ML
500 INJECTION, SOLUTION INTRAVENOUS ONCE
Status: COMPLETED | OUTPATIENT
Start: 2022-06-23 | End: 2022-06-23

## 2022-06-23 RX ORDER — ETOMIDATE 2 MG/ML
5 INJECTION INTRAVENOUS ONCE
Status: COMPLETED | OUTPATIENT
Start: 2022-06-23 | End: 2022-06-23

## 2022-06-23 RX ORDER — ALBUTEROL SULFATE 90 UG/1
2 AEROSOL, METERED RESPIRATORY (INHALATION) EVERY 6 HOURS PRN
Qty: 18 G | Refills: 0 | Status: ON HOLD | OUTPATIENT
Start: 2022-06-23 | End: 2022-08-09

## 2022-06-23 RX ORDER — METOPROLOL TARTRATE 1 MG/ML
5 INJECTION, SOLUTION INTRAVENOUS ONCE
Status: COMPLETED | OUTPATIENT
Start: 2022-06-23 | End: 2022-06-23

## 2022-06-23 RX ORDER — ONDANSETRON 2 MG/ML
4 INJECTION INTRAMUSCULAR; INTRAVENOUS ONCE
Status: COMPLETED | OUTPATIENT
Start: 2022-06-23 | End: 2022-06-23

## 2022-06-23 RX ORDER — ALBUTEROL SULFATE 90 UG/1
2 AEROSOL, METERED RESPIRATORY (INHALATION) EVERY 6 HOURS PRN
Qty: 8.5 G | Refills: 0 | Status: SHIPPED | OUTPATIENT
Start: 2022-06-23 | End: 2022-06-23 | Stop reason: SDUPTHER

## 2022-06-23 RX ADMIN — ETOMIDATE 5 MG: 2 INJECTION INTRAVENOUS at 15:42

## 2022-06-23 RX ADMIN — SODIUM CHLORIDE 500 ML: 9 INJECTION, SOLUTION INTRAVENOUS at 15:44

## 2022-06-23 RX ADMIN — ONDANSETRON 4 MG: 2 INJECTION INTRAMUSCULAR; INTRAVENOUS at 15:42

## 2022-06-23 RX ADMIN — METOPROLOL TARTRATE 5 MG: 1 INJECTION, SOLUTION INTRAVENOUS at 14:30

## 2022-06-23 RX ADMIN — SODIUM CHLORIDE 500 ML: 9 INJECTION, SOLUTION INTRAVENOUS at 14:30

## 2022-06-23 NOTE — ED PROVIDER NOTES
CHIEF COMPLAINT  Chief Complaint   Patient presents with   • Irregular Heart Beat       HPI  Gloria Herndon is a 81 y.o. female who presents evaluation of palpitations, and increased wheezing.  Patient has a history of asthma atrial fibrillation on Eliquis and metoprolol.  States she took a trip to [place], approximately 2 weeks ago and on upon her return developed a dry nonproductive cough runny nose a sore throat and has had some increased wheezing.  She was out of her at home albuterol inhaler so she went to an urgent care today where she was noted to be in atrial fibrillation with rapid ventricular response.  Does have a history of paroxysmal A. fib has been compliant with all her medications, does states she has been having intermittent episodes of palpitations for the past 3 days.  No associated chest pain dizziness lightheadedness or syncope.  Patient denies any lower extremity edema, history of DVT PE no hemoptysis.      REVIEW OF SYSTEMS  See HPI for further details. All other systems are negative.     PAST MEDICAL HISTORY   has a past medical history of Arrhythmia, Atrial fibrillation (HCC), History of cardiac radiofrequency ablation (12/01/2017), and TIA (transient ischemic attack) (10/2020).    SOCIAL HISTORY  Social History     Tobacco Use   • Smoking status: Never Smoker   • Smokeless tobacco: Never Used   Vaping Use   • Vaping Use: Never used   Substance and Sexual Activity   • Alcohol use: Yes     Comment: very rare glass of wine   • Drug use: Not Currently   • Sexual activity: Not on file       SURGICAL HISTORY   has a past surgical history that includes other cardiac surgery.    CURRENT MEDICATIONS  Home Medications     Reviewed by Tali Mcgee (Pharmacy Tech) on 06/23/22 at 1457  Med List Status: Complete   Medication Last Dose Status   acyclovir (ZOVIRAX) 400 MG tablet 6/23/2022 Active   clonazePAM (KLONOPIN) 0.5 MG Tab ABOUT 1 WEEK AGO Active   estrogens, conjugated (PREMARIN) 0.3  "MG Tab 6/23/2022 Active   Evolocumab, REPATHA, 140 MG/ML Solution Auto-injector ABOUT 2 WEEKS AGO Active   metoprolol SR (TOPROL XL) 50 MG TABLET SR 24 HR 6/23/2022 Active   rivaroxaban (XARELTO) 20 MG Tab tablet 6/22/2022 Active   VITAMIN D, CHOLECALCIFEROL, PO 6/22/2022 Active                ALLERGIES  Allergies   Allergen Reactions   • Eliquis [Apixaban] Unspecified     Unknown reaction pt states she was hospitalized    • Iodine      Other reaction(s): Other (See Comments)  unsure   • Blue Dyes Unspecified     GREEN DYE IS A PROBLEM TOO as per pt   • Ethyl Alcohol, Skin    • Iodine Solution [Povidone Iodine]      Red spots, pain   • Omeprazole Unspecified     Urinary incontinence    • Tape        FAMILY HISTORY  No pertinent family history    PHYSICAL EXAM   BP (!) 91/55   Pulse 68   Temp 37.3 °C (99.1 °F)   Resp (!) 32   Ht 1.702 m (5' 7\")   Wt 62.1 kg (137 lb)   SpO2 93%   BMI 21.46 kg/m²  @DOMENICA[[id number]::@   Pulse ox interpretation: I interpret this pulse ox as normal.  VITALS - vital signs documented prior to this note have been reviewed and noted,  GENERAL - awake, alert, oriented, GCS 15, no apparent distress, non-toxic  appearing  HEENT - normocephalic, atraumatic, pupils equal, sclera anicteric, mucus  membranes moist  NECK - supple, no meningismus, full active range of motion, trachea midline  CARDIOVASCULAR - tachycardic irregularly irregular  no murmurs/gallops/rubs  PULMONARY - no respiratory distress, speaking in full sentences, clear to  auscultation bilaterally, no wheezing/ronchi/rales, no accessory muscle use  GASTROINTESTINAL - soft, non-tender, non-distended, no rebound, guarding,  or peritonitis  GENITOURINARY - Deferred  NEUROLOGIC - Awake alert, normal mental status, speech fluid, cognition  normal, moves all extremities  MUSCULOSKELETAL - no obvious asymmetry or deformities present  EXTREMITIES - warm, well-perfused, no cyanosis or significant edema  DERMATOLOGIC - warm, dry, no rashes, " no jaundice  PSYCHIATRIC - normal affect, normal insight, normal concentration            EKG  EKG shows A. fib with rapid ventricular response at a rate of 147, with a normal axis normal QRS and QTc interval there is ST depression in V2 and V3 no STEMI pattern A. fib is new when compared to prior Interpretation is A. fib RVR EKG at      1544 shows a normal sinus rhythm at a rate of 65 with a normal FL QRS QTc interval normal axis no STEMI pattern interpretation is sinus rhythm.    LABS      Labs Reviewed   COMP METABOLIC PANEL - Abnormal; Notable for the following components:       Result Value    Glucose 111 (*)     AST(SGOT) 46 (*)     Alkaline Phosphatase 125 (*)     All other components within normal limits   COV-2, FLU A/B, AND RSV BY PCR (LibratoneID) - Abnormal; Notable for the following components:    SARS-CoV-2 by PCR DETECTED (*)     All other components within normal limits   CBC WITH DIFFERENTIAL   TROPONIN   ESTIMATED GFR         Pertinent Labs & Imaging studies reviewed. (See chart for details)    RADIOLOGY  CT-CHEST (THORAX) W/O   Final Result      1.  Bilateral posterior atelectasis. No consolidation or mass identified. Opacification seen on recent radiograph may have been related to overlapping bony shadows.      2.  Atherosclerosis      Fleischner Society pulmonary nodule recommendations:   Not Applicable         DX-CHEST-PORTABLE (1 VIEW)   Final Result      Left apical 3 cm abnormal density laterally could be from rib sclerosis/expansion, medial clavicle osteoblastic change, or pulmonary nodule. Thorax CT is advised to further characterize                ED COURSE/PROCEDURES    Critical care    Critical Care Procedure Note    Total critical care time: Approximately 53 minutes    Upon my assess due to a high probability of clinically significant, life threatening deterioration, secondary to atrial fibrillation with rapid ventricular response the patient required my direct attention and intervention.  This critical care time included obtaining a history; examining the patient; pulse oximetry; ordering and review of studies; arranging urgent treatment with development of a management plan; evaluation of patient's response to treatment; frequent reassessment; and, discussions with other providers.    was exclusive of separately billable procedures and treating other patients and teaching time.      PROCEDURE NOTE: Procedural Sedation    Performed by: [Provider Name]    Attending: Dr Nicole    Indications: [cardioversion     ]    Universal Protocol: a time out was performed and the correct patient and site were verified    Consent: The risks and benefits of conscious sedation were discuessed including the risk of aspiration, deep sedation requiring airway management including possible intubation, nausea/vomiting and the risks of not performing the procedure.  The alternatives of performing the procedure, including local anesthesia and IV analgesia, also discussed.       Monitoring: Continuous monitoring of heart r did agree to site oximetry and ETCO2. Supplemental oxygen prior to and during procedure via nasal cannula. Resuscitation equipment available at the bedside during sedation.        The patient received [5 mg of etomidate and 4 of Zofran. The patient was recovered from the sedation without complication or incident. Patient returned to pre-sedation level of awareness. The monitoring was discontinued at this time.      Post-anesthesia evaluation:    Respiratory function, cardiovascular function, temperature, and mental status [did/did not] return to pre-anesthetic state.    Total procedure time12 minutes                Procedure note  Procedure cardioversion  Performed by Dr. Nicole    The appropriate time-out procedure was performed including proper identification of the patient, physician, procedure, documentation, and there were no safety issues identified. The patient participated actively in this.   Patient was cardioverted using synchronized cardioversion 200 J    Repeat EKG confirms return to sinus rhythm.      Complications:  The patient tolerated the procedure well without complications.          Reevaluation at 1515 patient was bolused with metoprolol did have some subsequent hypotension, was given IV fluids though did remain borderline hypotensive, as such cardiology was paged    Reevaluation at 1535 patient has now become hypotensive, as such the decision was made to perform an emergent cardioversion.  See separate procedure note for complete details.    Reevaluation at 1554 patient now is awake alert in a sinus bradycardia with no acute complaints.      Medications   Metoprolol Tartrate (LOPRESSOR) injection 5 mg (5 mg Intravenous Given 6/23/22 1430)   NS (BOLUS) infusion 500 mL (0 mL Intravenous Stopped 6/23/22 1500)   NS (BOLUS) infusion 500 mL (0 mL Intravenous Stopped 6/23/22 1632)   etomidate (AMIDATE) injection 5 mg (5 mg Intravenous Given 6/23/22 1542)   ondansetron (ZOFRAN) syringe/vial injection 4 mg (4 mg Intravenous Given 6/23/22 1542)               MEDICAL DECISION MAKING        Patient presented for evaluation of shortness of breath and palpitations.  Upon arrival in the emergency department she was noted to be in atrial fibrillation with rapid ventricular spots.  Does have a history of same.  Is compliant with her Xarelto and metoprolol.  Initially the patient was normotensive as such she was bolused with 5 mg of metoprolol, she did experience a subsequent hypotension, that was refractory to IV fluids.  Cardiology was paged however the patient's blood pressure did drop into the 70 systolic range at this point the decision was made to perform a synchronized cardioversion.  Please see separate procedure note for complete details.  After the cardioversion the patient was cardioverted successfully to sinus bradycardia, and patient's hypotension resolved for a brief period and then returned.   Patient was completely asymptomatic of this states her blood pressure is not normally a little bit low.  May have been a residual side effect from the metoprolol she states she is sensitive to metoprolol. Did speak with on call cardiology reccommended continued outpatient follow up Her chest x-ray did show a large nodule as such a CT chest was obtained which was negative, may have been an overlapping bone shadow.  COVID test is positive, no hypoxia, and clinically the patient is quite well-appearing. Outside the window for paxlovid or antibody infusion.  She was able to ambulate, without any dizziness near syncope or other complaints and blood pressure improved with ambulation.  Shared decision-making conversation was had versus observation versus outpatient management and the patient did feel comfortable following up as an outpatient.  Patient has been observed for several hours after the cardioversion with no residual atrial fibrillation thus I consider discharge reasonable.  She will be given a prescription of her albuterol inhaler and instructed to return with any other new or worsening symptoms.    FINAL IMPRESSION  1.  A. fib RVR  2.  Dehydration  3.  COVID-19      Electronically signed by: Kyle Ball D.O., 6/23/2022 2:15 PM      Dictation Disclaimer  Please note this report has been produced using speech recognition software and  may contain errors related to that system, including errors seen in grammar,  punctuation and spelling, as well as words and phrases that may be inappropriate.  If there are any questions or concerns, please feel free to contact the dictating  physician for clarification.

## 2022-06-23 NOTE — ED TRIAGE NOTES
Pt reports that this morning she was needing an inhaler and went to urgent care. She reports that she has a hx of asthma and ran out of inhaler. At the urgent care the MD recommended that she come into ED for further evaluation of her Afib.     She reports that she has been feeling SOB with a sore throat for x5 days.

## 2022-06-23 NOTE — ED NOTES
Cardioversion performed    1544 3 RNs, MD, RT present at bedside.   1544 Time out call all agree   1544 4mg Zofran given  1546 5 mg etomidate given  1547 shocked at 200J    1548 EKG performed and completed at 1551  Procedure end at 1553

## 2022-06-23 NOTE — ED NOTES
Pt roomed into  from [facility] in place. IV in place. Labs collected and sent.

## 2022-06-24 NOTE — ED NOTES
Pt given discharge instructions. Pt and  at bedside for teaching. All questions and concerns answered.

## 2022-06-24 NOTE — ED NOTES
Pt ambulated around room with RN. No complaints noted. BP cuff switched to R arm. Updated within epic.

## 2022-06-24 NOTE — DISCHARGE INSTRUCTIONS
Follow-up with your cardiologist if you develop any dizziness lightheadedness passout significant shortness of breath return to the emergency department immediately continue take all your medications as previously prescribed, try the albuterol inhaler as needed for wheezing.  Return with any other new or concerning symptoms.

## 2022-07-07 ENCOUNTER — TELEPHONE (OUTPATIENT)
Dept: CARDIOLOGY | Facility: MEDICAL CENTER | Age: 82
End: 2022-07-07
Payer: MEDICARE

## 2022-07-07 NOTE — TELEPHONE ENCOUNTER
Yady Doan R.N.  You 39 minutes ago (12:28 PM)     KG    Hi! Response for JA patient. Thanks!    Message text        HAO Costa R.N. 1 hour ago (11:50 AM)         Recommend continue afib medications if vitals stable.     Can you please obtain vitals?     Marlene    Message text       -----------------------------------  Called pt [phone]  No answer, left DETAILED VM regarding SC recommendations and to call [phone] with VS

## 2022-07-07 NOTE — TELEPHONE ENCOUNTER
KANDACE Lyons: Garrett Philippe (pt )    Topic/issue: AFIB    Manojyvonne states that pt has been in AFIB for 2 days, as well as battling COVID. Pt  would like to know if she should continue taking the medication as prescribed or if she should stop the medication and head back into the ER for a cardioversion. Please advise.    Thank you,  Kendall ÁLVAREZ    Callback Number: [phone] (home) [phone] (work)

## 2022-07-08 NOTE — TELEPHONE ENCOUNTER
Called pt  Milan [phone]  Milan reports pt has been taking metoprolol 100 mg BID for the last 2 days. And pt is still in Afib. Milan would like to JA recommendations and/or return to ED for another cardioversion. Milan reports pt is COVID + and still symptomatic. Advised Milan RN will message KANDACE for recommendations. Milan verbalized understanding.          To KANDACE for recommendations

## 2022-07-08 NOTE — TELEPHONE ENCOUNTER
JA    Patient's  was calling back to check in and see if there had been any response yet.      Thank you    -Gerry SERRANO

## 2022-07-08 NOTE — TELEPHONE ENCOUNTER
KANDACE      Caller: Gloria Herndon  (Christopher Horton called for pt)  Topic/issue: He was asking if it was ok for pt to keep upping her intake for her metoprolol SR (TOPROL XL) 50 MG TABLET SR 24 HR medication for her AFIB. He asked for a call back to discuss  Callback Number: [phone]      Thank you    -Gerry SERRANO

## 2022-07-11 NOTE — TELEPHONE ENCOUNTER
Called patient work number and it went directly to ROEL WHALEN to  callback and will try other number as well.    Called patient home number and it is no longer in service.

## 2022-07-14 ENCOUNTER — TELEPHONE (OUTPATIENT)
Dept: CARDIOLOGY | Facility: MEDICAL CENTER | Age: 82
End: 2022-07-14

## 2022-07-14 ENCOUNTER — OFFICE VISIT (OUTPATIENT)
Dept: CARDIOLOGY | Facility: MEDICAL CENTER | Age: 82
End: 2022-07-14
Payer: MEDICARE

## 2022-07-14 VITALS
BODY MASS INDEX: 21.03 KG/M2 | HEIGHT: 67 IN | RESPIRATION RATE: 16 BRPM | OXYGEN SATURATION: 96 % | SYSTOLIC BLOOD PRESSURE: 110 MMHG | WEIGHT: 134 LBS | DIASTOLIC BLOOD PRESSURE: 60 MMHG | HEART RATE: 110 BPM

## 2022-07-14 DIAGNOSIS — G45.9 TIA (TRANSIENT ISCHEMIC ATTACK): ICD-10-CM

## 2022-07-14 DIAGNOSIS — Z86.79 HISTORY OF ATRIAL FIBRILLATION: ICD-10-CM

## 2022-07-14 DIAGNOSIS — Z82.49 FAMILY HISTORY OF HEART DISEASE: ICD-10-CM

## 2022-07-14 PROCEDURE — 99214 OFFICE O/P EST MOD 30 MIN: CPT | Performed by: INTERNAL MEDICINE

## 2022-07-14 RX ORDER — METOPROLOL SUCCINATE 100 MG/1
100 TABLET, EXTENDED RELEASE ORAL DAILY
Qty: 90 TABLET | Refills: 3 | Status: SHIPPED | OUTPATIENT
Start: 2022-07-14 | End: 2023-06-21

## 2022-07-14 RX ORDER — COVID-19 ANTIGEN TEST
KIT MISCELLANEOUS
COMMUNITY
Start: 2022-07-02 | End: 2022-07-14

## 2022-07-14 RX ORDER — FLECAINIDE ACETATE 50 MG/1
50 TABLET ORAL 2 TIMES DAILY
Qty: 180 TABLET | Refills: 3 | Status: SHIPPED | OUTPATIENT
Start: 2022-07-14 | End: 2022-09-21

## 2022-07-14 RX ORDER — METOPROLOL SUCCINATE 100 MG/1
100 TABLET, EXTENDED RELEASE ORAL DAILY
Qty: 90 TABLET | Refills: 3 | Status: SHIPPED | OUTPATIENT
Start: 2022-07-14 | End: 2022-07-14 | Stop reason: SDUPTHER

## 2022-07-14 ASSESSMENT — FIBROSIS 4 INDEX: FIB4 SCORE: 2.64

## 2022-07-14 NOTE — TELEPHONE ENCOUNTER
RX sent to updated pharmacy. Left detailed message for patient with update. Advised to return call with any additional questions or concerns.

## 2022-07-14 NOTE — PROGRESS NOTES
"CARDIOLOGY OUTPATIENT FOLLOWUP    PCP: Rambo Mullins M.D.    1. History of atrial fibrillation    2. Family history of heart disease    3. TIA (transient ischemic attack)        Gloria Herndon has recurrent persistent atrial fibrillation with rapid ventricular response.  She has been anticoagulated with Xarelto and I advised initiating flecainide 50 mg twice daily and will maintain the current dosage of metoprolol to 100 mg daily.  We will schedule her for a cardioversion and check thyroid function before hand.      Follow up: 3 months at which time we can decide if weaning off flecainide is warranted or can pursue stress testing.    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  She also has a family history of hypertrophic cardiomyopathy.  She recently had recurrent atrial fibrillation treated with cardioversion.  Incidentally, she was diagnosed with COVID at the time.  She was given Paxlovid and had recurrence of atrial fibrillation with persistent RVR despite escalating doses of metoprolol.    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       ROS:   10 point review systems is otherwise negative except as per the HPI    PE:  /60 (BP Location: Left arm, Patient Position: Sitting, BP Cuff Size: Adult)   Pulse (!) 110   Resp 16   Ht 1.702 m (5' 7\")   Wt 60.8 kg (134 lb)   SpO2 96%   BMI 20.99 kg/m²   Gen: no acute distress  HEENT: Symmetric face. Anicteric sclerae. Moist mucus membranes  NECK: No JVD. No lymphadenopathy  CARDIAC: Irregular, normal S1, S2, no murmur  VASCULATURE: carotids are normal bilaterally without bruit  RESP: Clear to auscultation bilaterally  ABD: Soft, " non-tender, non-distended  EXT: No edema, no clubbing or cyanosis  SKIN: Warm and dry  NEURO: No gross deficits  PSYCH: Appropriate affect, participates in conversation    The ASCVD Risk score (Mikey DC Jr, et al., 2013) failed to calculate.    Past Medical History:   Diagnosis Date   • Arrhythmia     Pt states she has Atrial Fib   • Atrial fibrillation (HCC)    • History of cardiac radiofrequency ablation 12/01/2017   • TIA (transient ischemic attack) 10/2020     Allergies   Allergen Reactions   • Eliquis [Apixaban] Unspecified     Unknown reaction pt states she was hospitalized    • Iodine      Other reaction(s): Other (See Comments)  unsure   • Blue Dyes Unspecified     GREEN DYE IS A PROBLEM TOO as per pt   • Ethyl Alcohol, Skin    • Iodine Solution [Povidone Iodine]      Red spots, pain   • Omeprazole Unspecified     Urinary incontinence    • Tape      Outpatient Encounter Medications as of 7/14/2022   Medication Sig Dispense Refill   • metoprolol SR (TOPROL XL) 100 MG TABLET SR 24 HR Take 1 Tablet by mouth every day. 90 Tablet 3   • flecainide (TAMBOCOR) 50 MG tablet Take 1 Tablet by mouth 2 times a day. 180 Tablet 3   • albuterol 108 (90 Base) MCG/ACT Aero Soln inhalation aerosol Inhale 2 Puffs every 6 hours as needed for Shortness of Breath. 18 g 0   • VITAMIN D, CHOLECALCIFEROL, PO Take 1 Tablet by mouth every 48 hours.     • rivaroxaban (XARELTO) 20 MG Tab tablet Take 1 Tablet by mouth with dinner. 90 Tablet 3   • clonazePAM (KLONOPIN) 0.5 MG Tab Take 0.5 mg by mouth 1 time a day as needed (Sleep).     • acyclovir (ZOVIRAX) 400 MG tablet Take 400-800 mg by mouth 1 time a day as needed (Cold sores).     • Evolocumab, REPATHA, 140 MG/ML Solution Auto-injector Inject 1 mL under the skin Q30 DAYS.     • estrogens, conjugated (PREMARIN) 0.3 MG Tab Take 0.3 mg by mouth every day.     • [DISCONTINUED] FLOWFLEX COVID-19 AG HOME TEST Kit FOR PERSONAL USE ONLY. NOT FOR EMPLOYMENT PURPOSES OR FOR RESALE (Patient not  taking: Reported on 7/14/2022)     • [DISCONTINUED] PAXLOVID 20 x 150 MG & 10 x 100MG Tablet Therapy Pack TAKE 2 PINK TABS (NIRMATRELVIR 150MG) AND 1 WHITE TAB (RITONAVIR 100MG) TWICE DAILY FOR 5 DAYS (Patient not taking: Reported on 7/14/2022)     • [DISCONTINUED] metoprolol SR (TOPROL XL) 50 MG TABLET SR 24 HR Take 1 Tablet by mouth every day. (Patient taking differently: Take 100 mg by mouth every day.) 90 Tablet 3     No facility-administered encounter medications on file as of 7/14/2022.     Social History     Socioeconomic History   • Marital status: Single     Spouse name: Not on file   • Number of children: Not on file   • Years of education: Not on file   • Highest education level: Not on file   Occupational History   • Not on file   Tobacco Use   • Smoking status: Never Smoker   • Smokeless tobacco: Never Used   Vaping Use   • Vaping Use: Never used   Substance and Sexual Activity   • Alcohol use: Not Currently     Comment: very rare glass of wine   • Drug use: Not Currently   • Sexual activity: Not on file   Other Topics Concern   • Not on file   Social History Narrative   • Not on file     Social Determinants of Health     Financial Resource Strain: Not on file   Food Insecurity: Not on file   Transportation Needs: Not on file   Physical Activity: Not on file   Stress: Not on file   Social Connections: Not on file   Intimate Partner Violence: Not on file   Housing Stability: Not on file       Studies  Lab Results   Component Value Date/Time    CHOLSTRLTOT 179 10/07/2020 05:50 AM    LDL 81 10/07/2020 05:50 AM    HDL 81 10/07/2020 05:50 AM    TRIGLYCERIDE 87 10/07/2020 05:50 AM       Lab Results   Component Value Date/Time    SODIUM 136 06/23/2022 01:59 PM    POTASSIUM 4.4 06/23/2022 01:59 PM    CHLORIDE 101 06/23/2022 01:59 PM    CO2 21 06/23/2022 01:59 PM    GLUCOSE 111 (H) 06/23/2022 01:59 PM    BUN 20 06/23/2022 01:59 PM    CREATININE 0.83 06/23/2022 01:59 PM     Lab Results   Component Value Date/Time     ALKPHOSPHAT 125 (H) 06/23/2022 01:59 PM    ASTSGOT 46 (H) 06/23/2022 01:59 PM    ALTSGPT 30 06/23/2022 01:59 PM    TBILIRUBIN 0.7 06/23/2022 01:59 PM        For this encounter I reviewed the following medical records BMP, Lipid profile and LFT   For this encounter I directly reviewed ECG tracings.  Atrial fibrillation. I otherwise agree with the interpretation in the EHR.

## 2022-07-14 NOTE — TELEPHONE ENCOUNTER
----- Message from Natlaia Jones sent at 7/14/2022 11:07 AM PDT -----  Regarding: RX  Richard Conteh,     The pt attached had just gotten done seeing BE and would like to get her medication: metoprolol SR (TOPROL XL) 100 MG TABLET SR, sent over to the [facility] pharmacy in [place], instead of the [facility] mailing subscription since she is very low on this medication and would like to pick it up ASAP.    Please reach out to the pt if you have any further questions/concerns regarding this.    Thank you,     Natalia

## 2022-07-22 ENCOUNTER — TELEPHONE (OUTPATIENT)
Dept: CARDIOLOGY | Facility: MEDICAL CENTER | Age: 82
End: 2022-07-22

## 2022-07-22 NOTE — TELEPHONE ENCOUNTER
Caller: Gloria Herndon    Topic/issue: Patient was asking for a call back about a cardioversion that had been discussed  Callback Number: [phone] (home) [phone] (work)      Thank you    -Gerry SERRANO

## 2022-07-25 ENCOUNTER — TELEPHONE (OUTPATIENT)
Dept: SCHEDULING | Facility: IMAGING CENTER | Age: 82
End: 2022-07-25

## 2022-07-26 NOTE — TELEPHONE ENCOUNTER
Patient is scheduled on 8-9-22 for a Cardioversion w/anesthesia with Dr. Jensen. No meds to stop and patient to check in at 9:00 for an 11:00 procedure. H&P was done on 7-14-22 by Dr. Jesus. Pre admit to call patient.

## 2022-08-09 ENCOUNTER — ANESTHESIA (OUTPATIENT)
Dept: CARDIOLOGY | Facility: MEDICAL CENTER | Age: 82
End: 2022-08-09
Payer: MEDICARE

## 2022-08-09 ENCOUNTER — APPOINTMENT (OUTPATIENT)
Dept: CARDIOLOGY | Facility: MEDICAL CENTER | Age: 82
End: 2022-08-09
Attending: INTERNAL MEDICINE
Payer: MEDICARE

## 2022-08-09 ENCOUNTER — ANESTHESIA EVENT (OUTPATIENT)
Dept: CARDIOLOGY | Facility: MEDICAL CENTER | Age: 82
End: 2022-08-09
Payer: MEDICARE

## 2022-08-09 ENCOUNTER — HOSPITAL ENCOUNTER (OUTPATIENT)
Facility: MEDICAL CENTER | Age: 82
End: 2022-08-09
Attending: INTERNAL MEDICINE | Admitting: INTERNAL MEDICINE
Payer: MEDICARE

## 2022-08-09 VITALS
SYSTOLIC BLOOD PRESSURE: 109 MMHG | OXYGEN SATURATION: 96 % | HEART RATE: 66 BPM | RESPIRATION RATE: 16 BRPM | BODY MASS INDEX: 20.87 KG/M2 | DIASTOLIC BLOOD PRESSURE: 56 MMHG | HEIGHT: 67 IN | WEIGHT: 132.94 LBS | TEMPERATURE: 97.3 F

## 2022-08-09 DIAGNOSIS — Z86.79 HISTORY OF ATRIAL FIBRILLATION: ICD-10-CM

## 2022-08-09 LAB
ANION GAP SERPL CALC-SCNC: 15 MMOL/L (ref 7–16)
BUN SERPL-MCNC: 21 MG/DL (ref 8–22)
CALCIUM SERPL-MCNC: 9.6 MG/DL (ref 8.5–10.5)
CHLORIDE SERPL-SCNC: 106 MMOL/L (ref 96–112)
CO2 SERPL-SCNC: 21 MMOL/L (ref 20–33)
CREAT SERPL-MCNC: 0.91 MG/DL (ref 0.5–1.4)
EKG IMPRESSION: NORMAL
EKG IMPRESSION: NORMAL
ERYTHROCYTE [DISTWIDTH] IN BLOOD BY AUTOMATED COUNT: 44.9 FL (ref 35.9–50)
GFR SERPLBLD CREATININE-BSD FMLA CKD-EPI: 63 ML/MIN/1.73 M 2
GLUCOSE SERPL-MCNC: 105 MG/DL (ref 65–99)
HCT VFR BLD AUTO: 40.1 % (ref 37–47)
HGB BLD-MCNC: 13.3 G/DL (ref 12–16)
INR PPP: 1.24 (ref 0.87–1.13)
MCH RBC QN AUTO: 30 PG (ref 27–33)
MCHC RBC AUTO-ENTMCNC: 33.2 G/DL (ref 33.6–35)
MCV RBC AUTO: 90.5 FL (ref 81.4–97.8)
PLATELET # BLD AUTO: 238 K/UL (ref 164–446)
PMV BLD AUTO: 10.5 FL (ref 9–12.9)
POTASSIUM SERPL-SCNC: 4.3 MMOL/L (ref 3.6–5.5)
PROTHROMBIN TIME: 15.5 SEC (ref 12–14.6)
RBC # BLD AUTO: 4.43 M/UL (ref 4.2–5.4)
SODIUM SERPL-SCNC: 142 MMOL/L (ref 135–145)
WBC # BLD AUTO: 7.1 K/UL (ref 4.8–10.8)

## 2022-08-09 PROCEDURE — 700111 HCHG RX REV CODE 636 W/ 250 OVERRIDE (IP): Performed by: ANESTHESIOLOGY

## 2022-08-09 PROCEDURE — 93005 ELECTROCARDIOGRAM TRACING: CPT | Performed by: INTERNAL MEDICINE

## 2022-08-09 PROCEDURE — 99100 ANES PT EXTEME AGE<1 YR&>70: CPT | Performed by: ANESTHESIOLOGY

## 2022-08-09 PROCEDURE — 85610 PROTHROMBIN TIME: CPT

## 2022-08-09 PROCEDURE — 92960 CARDIOVERSION ELECTRIC EXT: CPT

## 2022-08-09 PROCEDURE — 160046 HCHG PACU - 1ST 60 MINS PHASE II

## 2022-08-09 PROCEDURE — 36415 COLL VENOUS BLD VENIPUNCTURE: CPT

## 2022-08-09 PROCEDURE — 700101 HCHG RX REV CODE 250: Performed by: ANESTHESIOLOGY

## 2022-08-09 PROCEDURE — 160035 HCHG PACU - 1ST 60 MINS PHASE I

## 2022-08-09 PROCEDURE — 93010 ELECTROCARDIOGRAM REPORT: CPT | Mod: 59 | Performed by: INTERNAL MEDICINE

## 2022-08-09 PROCEDURE — 160002 HCHG RECOVERY MINUTES (STAT)

## 2022-08-09 PROCEDURE — 00410 ANES INTEG SYS CONV ARRHYT: CPT | Performed by: ANESTHESIOLOGY

## 2022-08-09 PROCEDURE — 700105 HCHG RX REV CODE 258: Performed by: INTERNAL MEDICINE

## 2022-08-09 PROCEDURE — 80048 BASIC METABOLIC PNL TOTAL CA: CPT

## 2022-08-09 PROCEDURE — 92960 CARDIOVERSION ELECTRIC EXT: CPT | Performed by: INTERNAL MEDICINE

## 2022-08-09 PROCEDURE — 85027 COMPLETE CBC AUTOMATED: CPT

## 2022-08-09 RX ORDER — LABETALOL HYDROCHLORIDE 5 MG/ML
5 INJECTION, SOLUTION INTRAVENOUS
Status: DISCONTINUED | OUTPATIENT
Start: 2022-08-09 | End: 2022-08-09 | Stop reason: HOSPADM

## 2022-08-09 RX ORDER — HYDRALAZINE HYDROCHLORIDE 20 MG/ML
5 INJECTION INTRAMUSCULAR; INTRAVENOUS
Status: DISCONTINUED | OUTPATIENT
Start: 2022-08-09 | End: 2022-08-09 | Stop reason: HOSPADM

## 2022-08-09 RX ORDER — ONDANSETRON 2 MG/ML
4 INJECTION INTRAMUSCULAR; INTRAVENOUS
Status: DISCONTINUED | OUTPATIENT
Start: 2022-08-09 | End: 2022-08-09 | Stop reason: HOSPADM

## 2022-08-09 RX ORDER — SODIUM CHLORIDE, SODIUM LACTATE, POTASSIUM CHLORIDE, CALCIUM CHLORIDE 600; 310; 30; 20 MG/100ML; MG/100ML; MG/100ML; MG/100ML
INJECTION, SOLUTION INTRAVENOUS CONTINUOUS
Status: ACTIVE | OUTPATIENT
Start: 2022-08-09 | End: 2022-08-09

## 2022-08-09 RX ORDER — DIPHENHYDRAMINE HYDROCHLORIDE 50 MG/ML
12.5 INJECTION INTRAMUSCULAR; INTRAVENOUS
Status: DISCONTINUED | OUTPATIENT
Start: 2022-08-09 | End: 2022-08-09 | Stop reason: HOSPADM

## 2022-08-09 RX ORDER — LIDOCAINE HYDROCHLORIDE 20 MG/ML
INJECTION, SOLUTION EPIDURAL; INFILTRATION; INTRACAUDAL; PERINEURAL PRN
Status: DISCONTINUED | OUTPATIENT
Start: 2022-08-09 | End: 2022-08-09 | Stop reason: SURG

## 2022-08-09 RX ORDER — ALBUTEROL SULFATE 2.5 MG/3ML
2.5 SOLUTION RESPIRATORY (INHALATION)
Status: DISCONTINUED | OUTPATIENT
Start: 2022-08-09 | End: 2022-08-09 | Stop reason: HOSPADM

## 2022-08-09 RX ORDER — HALOPERIDOL 5 MG/ML
1 INJECTION INTRAMUSCULAR
Status: DISCONTINUED | OUTPATIENT
Start: 2022-08-09 | End: 2022-08-09 | Stop reason: HOSPADM

## 2022-08-09 RX ORDER — SODIUM CHLORIDE, SODIUM LACTATE, POTASSIUM CHLORIDE, CALCIUM CHLORIDE 600; 310; 30; 20 MG/100ML; MG/100ML; MG/100ML; MG/100ML
INJECTION, SOLUTION INTRAVENOUS CONTINUOUS
Status: DISCONTINUED | OUTPATIENT
Start: 2022-08-09 | End: 2022-08-09 | Stop reason: HOSPADM

## 2022-08-09 RX ORDER — OXYCODONE HCL 5 MG/5 ML
5 SOLUTION, ORAL ORAL
Status: DISCONTINUED | OUTPATIENT
Start: 2022-08-09 | End: 2022-08-09 | Stop reason: HOSPADM

## 2022-08-09 RX ORDER — OXYCODONE HCL 5 MG/5 ML
10 SOLUTION, ORAL ORAL
Status: DISCONTINUED | OUTPATIENT
Start: 2022-08-09 | End: 2022-08-09 | Stop reason: HOSPADM

## 2022-08-09 RX ADMIN — LIDOCAINE HYDROCHLORIDE 30 MG: 20 INJECTION, SOLUTION EPIDURAL; INFILTRATION; INTRACAUDAL at 12:53

## 2022-08-09 RX ADMIN — SODIUM CHLORIDE, POTASSIUM CHLORIDE, SODIUM LACTATE AND CALCIUM CHLORIDE: 600; 310; 30; 20 INJECTION, SOLUTION INTRAVENOUS at 12:09

## 2022-08-09 RX ADMIN — PROPOFOL 30 MG: 10 INJECTION, EMULSION INTRAVENOUS at 12:53

## 2022-08-09 ASSESSMENT — PAIN SCALES - GENERAL: PAIN_LEVEL: 0

## 2022-08-09 ASSESSMENT — FIBROSIS 4 INDEX: FIB4 SCORE: 2.64

## 2022-08-09 NOTE — OR NURSING
Patient's  called and provided with update. Patient provided with moisturizing ointment and RN placed on reddened area on anterior chest from pad. Patient denies other pain or nausea. Patient states all needs are currently met.

## 2022-08-09 NOTE — ANESTHESIA TIME REPORT
Anesthesia Start and Stop Event Times     Date Time Event    8/9/2022 1221 Ready for Procedure     1243 Anesthesia Start     1309 Anesthesia Stop        Responsible Staff  08/09/22    Name Role Begin End    Erlin Rowan M.D. Anesth 1243 1309        Overtime Reason:  no overtime (within assigned shift)    Comments:

## 2022-08-09 NOTE — ANESTHESIA PREPROCEDURE EVALUATION
Date/Time: 08/09/22 1100    Scheduled providers: Mikey Turner M.D.; Erlin Rowan M.D.    Procedure: CL-CARDIOVERSION    Diagnosis:       History of atrial fibrillation [Z86.79]      Paroxysmal atrial fibrillation [I48.0]    Indications: See Associated Dx    Location: [facility] IMAGING - ECHOCARDIOLOGY [facility]          Relevant Problems   ANESTHESIA (within normal limits)      PULMONARY (within normal limits)      NEURO   (positive) History of atrial fibrillation   (positive) TIA (transient ischemic attack)      GI (within normal limits)       (within normal limits)      ENDO (within normal limits)      Other   (positive) Dyslipidemia   (positive) Mild cognitive impairment       Physical Exam    Airway   Mallampati: II  TM distance: >3 FB  Neck ROM: full       Cardiovascular - normal exam  Rhythm: regular  Rate: normal  (-) murmur     Dental - normal exam           Pulmonary - normal exam  Breath sounds clear to auscultation     Abdominal    Neurological - normal exam                 Anesthesia Plan    ASA 2       Plan - general       Airway plan will be natural airway          Induction: intravenous    Postoperative Plan: Postoperative administration of opioids is intended.    Pertinent diagnostic labs and testing reviewed    Informed Consent:    Anesthetic plan and risks discussed with patient.    Use of blood products discussed with: patient whom consented to blood products.

## 2022-08-09 NOTE — DISCHARGE INSTRUCTIONS
HOME CARE INSTRUCTIONS    ACTIVITY: Rest and take it easy for the first 24 hours.  A responsible adult is recommended to remain with you during that time.  It is normal to feel sleepy.  We encourage you to not do anything that requires balance, judgment or coordination.    FOR 24 HOURS DO NOT:  Drive, operate machinery or run household appliances.  Drink beer or alcoholic beverages.  Make important decisions or sign legal documents.    SPECIAL INSTRUCTIONS: Electrical Cardioversion, Care After  This sheet gives you information about how to care for yourself after your procedure. Your health care provider may also give you more specific instructions. If you have problems or questions, contact your health care provider.  What can I expect after the procedure?  After the procedure, it is common to have:  Some redness on the skin where the shocks were given.  Follow these instructions at home:  Do not drive for 24 hours if you were given a medicine to help you relax (sedative).  Take over-the-counter and prescription medicines only as told by your health care provider.  Ask your health care provider how to check your pulse. Check it often.  Rest for 48 hours after the procedure or as told by your health care provider.  Avoid or limit your caffeine use as told by your health care provider.  Contact a health care provider if:  You feel like your heart is beating too quickly or your pulse is not regular.  You have a serious muscle cramp that does not go away.  Get help right away if:  You have discomfort in your chest.  You are dizzy or you feel faint.  You have trouble breathing or you are short of breath.  Your speech is slurred.  You have trouble moving an arm or leg on one side of your body.  Your fingers or toes turn cold or blue.  This information is not intended to replace advice given to you by your health care provider. Make sure you discuss any questions you have with your health care provider.  Document  Released: 10/08/2014 Document Revised: 11/30/2018 Document Reviewed: 06/23/2017  [employer] Patient Education © 2020 [employer]      DIET: To avoid nausea, slowly advance diet as tolerated, avoiding spicy or greasy foods for the first day.  Add more substantial food to your diet according to your physician's instructions.  Babies can be fed formula or breast milk as soon as they are hungry.  INCREASE FLUIDS AND FIBER TO AVOID CONSTIPATION.    SURGICAL DRESSING/BATHING: *okay to shower*    MEDICATIONS: Resume taking daily medication.  Take prescribed pain medication with food.  If no medication is prescribed, you may take non-aspirin pain medication if needed.  PAIN MEDICATION CAN BE VERY CONSTIPATING.  Take a stool softener or laxative such as senokot, pericolace, or milk of magnesia if needed.    Prescription given for ___.  Last pain medication given at ___.    A follow-up appointment should be arranged with your doctor; call to schedule.    You should CALL YOUR PHYSICIAN if you develop:  Fever greater than 101 degrees F.  Pain not relieved by medication, or persistent nausea or vomiting.  Excessive bleeding (blood soaking through dressing) or unexpected drainage from the wound.  Extreme redness or swelling around the incision site, drainage of pus or foul smelling drainage.  Inability to urinate or empty your bladder within 8 hours.  Problems with breathing or chest pain.    You should call 911 if you develop problems with breathing or chest pain.  If you are unable to contact your doctor or surgical center, you should go to the nearest emergency room or urgent care center.  Physician's telephone #: [phone]    MILD FLU-LIKE SYMPTOMS ARE NORMAL.  YOU MAY EXPERIENCE GENERALIZED MUSCLE ACHES, THROAT IRRITATION, HEADACHE AND/OR SOME NAUSEA.    If any questions arise, call your doctor.  If your doctor is not available, please feel free to call the Surgical Center at [phone].  The Center is open Monday  through Friday from 7AM to 7PM.      A registered nurse may call you a few days after your surgery to see how you are doing after your procedure.    You may also receive a survey in the mail within the next two weeks and we ask that you take a few moments to complete the survey and return it to us.  Our goal is to provide you with very good care and we value your comments.     Depression / Suicide Risk    As you are discharged from this [facility] facility, it is important to learn how to keep safe from harming yourself.    Recognize the warning signs:  Abrupt changes in personality, positive or negative- including increase in energy   Giving away possessions  Change in eating patterns- significant weight changes-  positive or negative  Change in sleeping patterns- unable to sleep or sleeping all the time   Unwillingness or inability to communicate  Depression  Unusual sadness, discouragement and loneliness  Talk of wanting to die  Neglect of personal appearance   Rebelliousness- reckless behavior  Withdrawal from people/activities they love  Confusion- inability to concentrate     If you or a loved one observes any of these behaviors or has concerns about self-harm, here's what you can do:  Talk about it- your feelings and reasons for harming yourself  Remove any means that you might use to hurt yourself (examples: pills, rope, extension cords, firearm)  Get professional help from the community (Mental Health, Substance Abuse, psychological counseling)  Do not be alone:Call your Safe Contact- someone whom you trust who will be there for you.  Call your local CRISIS HOTLINE 247-2125 or 668-899-7759  Call your local Children's Mobile Crisis Response Team [place] (700) 532-7054 or www.WhichSocial.com  Call the toll free National Suicide Prevention Hotlines   National Suicide Prevention Lifeline 800-759-XHDF (6171)  National Hope Line Network 800-SUICIDE (123-7466)    I acknowledge receipt and understanding  of these Home Care instructions.

## 2022-08-09 NOTE — PROGRESS NOTES
Patient did not take her Xarleto this am per her usual scheduled time. MD Turner came into patient room and requested patient to take her Xarleto as patient had it with her.     Xarleto 20mg taken @1128.

## 2022-08-09 NOTE — ANESTHESIA POSTPROCEDURE EVALUATION
Patient: Gloria Herndon    Procedure Summary     Date: 08/09/22 Room / Location: [facility] - ECHOCARDIOLOGY [facility]    Anesthesia Start: 1243 Anesthesia Stop: 1309    Procedure: CL-CARDIOVERSION Diagnosis:       History of atrial fibrillation      Paroxysmal atrial fibrillation      (See Associated Dx)    Scheduled Providers: Mikey Turner M.D.; Erlin Rowan M.D. Responsible Provider: Erlin Rowan M.D.    Anesthesia Type: general ASA Status: 2          Final Anesthesia Type: general  Last vitals  BP   Blood Pressure : 113/57    Temp   36.1 °C (97 °F)    Pulse   (!) 50   Resp   16    SpO2   97 %      Anesthesia Post Evaluation    Patient location during evaluation: PACU  Patient participation: complete - patient participated  Level of consciousness: awake and alert  Pain score: 0    Airway patency: patent  Anesthetic complications: no  Cardiovascular status: hemodynamically stable  Respiratory status: acceptable  Hydration status: euvolemic    PONV: none          No complications documented.     Nurse Pain Score: 0 (NPRS)

## 2022-08-09 NOTE — PROCEDURES
Electrical Cardioversion    Date/Time: 8/9/2022 12:56 PM  Performed by: Mikey Turner M.D.  Authorized by: Mikey Turner M.D.     Consent:     Consent obtained:  Verbal and written    Consent given by:  Patient    Risks discussed:  Cutaneous burn, death, induced arrhythmia and pain    Alternatives discussed:  No treatment, rate-control medication, anti-coagulation medication, alternative treatment, observation, delayed treatment and referral  Sedation:     Patient sedated: Yes      Sedation type:  Per anesthesia  Pre-procedure details:     Cardioversion basis:  Elective    Rhythm:  Atrial fibrillation    Electrode placement:  Anterior-posterior    Anticoagulation status:  Rivaroxaban  Attempt one:     Cardioversion mode:  Synchronous    Waveform:  Biphasic    Shock (Joules):  200    Shock outcome:  Conversion to normal sinus rhythm  Post-procedure details:     Patient status:  Awake    Patient tolerance of procedure:  Tolerated well, no immediate complications

## 2022-09-21 ENCOUNTER — OFFICE VISIT (OUTPATIENT)
Dept: CARDIOLOGY | Facility: MEDICAL CENTER | Age: 82
End: 2022-09-21
Payer: MEDICARE

## 2022-09-21 VITALS
HEIGHT: 67 IN | SYSTOLIC BLOOD PRESSURE: 120 MMHG | BODY MASS INDEX: 21.03 KG/M2 | HEART RATE: 59 BPM | OXYGEN SATURATION: 95 % | DIASTOLIC BLOOD PRESSURE: 60 MMHG | RESPIRATION RATE: 16 BRPM | WEIGHT: 134 LBS

## 2022-09-21 DIAGNOSIS — G45.9 TIA (TRANSIENT ISCHEMIC ATTACK): ICD-10-CM

## 2022-09-21 DIAGNOSIS — Z82.49 FAMILY HISTORY OF HEART DISEASE: ICD-10-CM

## 2022-09-21 DIAGNOSIS — Z86.79 HISTORY OF ATRIAL FIBRILLATION: ICD-10-CM

## 2022-09-21 PROCEDURE — 99214 OFFICE O/P EST MOD 30 MIN: CPT | Performed by: INTERNAL MEDICINE

## 2022-09-21 ASSESSMENT — FIBROSIS 4 INDEX: FIB4 SCORE: 2.89

## 2022-09-21 NOTE — PROGRESS NOTES
"CARDIOLOGY OUTPATIENT FOLLOWUP    PCP: Rambo Mullins M.D.    1. History of atrial fibrillation    2. TIA (transient ischemic attack)    3. Family history of heart disease        Gloria Herndon is maintaining sinus rhythm without antiarrhythmic therapy and has now been intolerant of a 5-day a week Xarelto regimen.  I advised continuing the present treatment regimen.    Follow up: 6 months at her request    Chief Complaint   Patient presents with    Atrial Fibrillation     F/V Dx: History of atrial fibrillation       History: Gloria Herndon is a 81 y.o. female with history of atrial fibrillation ablation and TIA presenting for follow-up.  She recently underwent a cardioversion after developing recurrent A. fib following COVID and then a second time after treatment with paxlovid.  She has been free of atrial fibrillation and continues to monitor with the apple watch.  She did have nosebleeds and a 5-day weekly regimen of Xarelto has resolved the condition.      ROS:   10 point review systems is otherwise negative except as per the HPI    PE:  /60 (BP Location: Left arm, Patient Position: Sitting, BP Cuff Size: Adult)   Pulse (!) 59   Resp 16   Ht 1.702 m (5' 7\")   Wt 60.8 kg (134 lb)   SpO2 95%   BMI 20.99 kg/m²   Gen: no acute distress  HEENT: Symmetric face. Anicteric sclerae. Moist mucus membranes  NECK: No JVD. No lymphadenopathy  CARDIAC: Regular, Normal S1, S2, No murmur  VASCULATURE: carotids are normal bilaterally without bruit  RESP: Clear to auscultation bilaterally  ABD: Soft, non-tender, non-distended  EXT: No edema, no clubbing or cyanosis  SKIN: Warm and dry  NEURO: No gross deficits  PSYCH: Appropriate affect, participates in conversation    The ASCVD Risk score (Lucien DC Jr, et al., 2013) failed to calculate.    Past Medical History:   Diagnosis Date    Arrhythmia     Pt states she has Atrial Fib    Atrial fibrillation (HCC)     History of cardiac radiofrequency ablation " 12/01/2017    TIA (transient ischemic attack) 10/2020     Allergies   Allergen Reactions    Eliquis [Apixaban] Unspecified     Unknown reaction pt states she was hospitalized     Iodine      Other reaction(s): Other (See Comments)  unsure    Blue Dyes Unspecified     GREEN DYE IS A PROBLEM TOO as per pt    Ethyl Alcohol, Skin     Green Dyes     Iodine Solution [Povidone Iodine]      Red spots, pain    Omeprazole Unspecified     Urinary incontinence     Tape      Outpatient Encounter Medications as of 9/21/2022   Medication Sig Dispense Refill    metoprolol SR (TOPROL XL) 100 MG TABLET SR 24 HR Take 1 Tablet by mouth every day. 90 Tablet 3    rivaroxaban (XARELTO) 20 MG Tab tablet Take 1 Tablet by mouth with dinner. (Patient taking differently: Take 20 mg by mouth with dinner. Doesn't take medication Tuesday and Saturday) 90 Tablet 3    Evolocumab, REPATHA, 140 MG/ML Solution Auto-injector Inject 1 mL under the skin Q30 DAYS.      estrogens, conjugated (PREMARIN) 0.3 MG Tab Take 0.3 mg by mouth every day.      [DISCONTINUED] flecainide (TAMBOCOR) 50 MG tablet Take 1 Tablet by mouth 2 times a day. (Patient not taking: Reported on 9/21/2022) 180 Tablet 3     No facility-administered encounter medications on file as of 9/21/2022.     Social History     Socioeconomic History    Marital status: Single     Spouse name: Not on file    Number of children: Not on file    Years of education: Not on file    Highest education level: Not on file   Occupational History    Not on file   Tobacco Use    Smoking status: Never    Smokeless tobacco: Never   Vaping Use    Vaping Use: Never used   Substance and Sexual Activity    Alcohol use: Not Currently     Comment: very rare glass of wine    Drug use: Not Currently    Sexual activity: Not on file   Other Topics Concern    Not on file   Social History Narrative    Not on file     Social Determinants of Health     Financial Resource Strain: Not on file   Food Insecurity: Not on file    Transportation Needs: Not on file   Physical Activity: Not on file   Stress: Not on file   Social Connections: Not on file   Intimate Partner Violence: Not on file   Housing Stability: Not on file       Studies  Lab Results   Component Value Date/Time    CHOLSTRLTOT 179 10/07/2020 05:50 AM    LDL 81 10/07/2020 05:50 AM    HDL 81 10/07/2020 05:50 AM    TRIGLYCERIDE 87 10/07/2020 05:50 AM       Lab Results   Component Value Date/Time    SODIUM 142 08/09/2022 11:58 AM    POTASSIUM 4.3 08/09/2022 11:58 AM    CHLORIDE 106 08/09/2022 11:58 AM    CO2 21 08/09/2022 11:58 AM    GLUCOSE 105 (H) 08/09/2022 11:58 AM    BUN 21 08/09/2022 11:58 AM    CREATININE 0.91 08/09/2022 11:58 AM     Lab Results   Component Value Date/Time    ALKPHOSPHAT 125 (H) 06/23/2022 01:59 PM    ASTSGOT 46 (H) 06/23/2022 01:59 PM    ALTSGPT 30 06/23/2022 01:59 PM    TBILIRUBIN 0.7 06/23/2022 01:59 PM        For this encounter I reviewed the following medical records BMP, Lipid profile, and LFT

## 2022-10-10 ENCOUNTER — TELEPHONE (OUTPATIENT)
Dept: CARDIOLOGY | Facility: MEDICAL CENTER | Age: 82
End: 2022-10-10
Payer: MEDICARE

## 2022-10-10 DIAGNOSIS — E78.5 DYSLIPIDEMIA: ICD-10-CM

## 2022-10-10 NOTE — TELEPHONE ENCOUNTER
BE        Caller: Milan (pt's )      Topic/issue: Patient's  was calling about notes that were sent by her new PCP about her continuing to take Repatha and asked for a call back    Callback Number: [phone]    Thank you    -Gerry SERRANO

## 2022-10-11 ENCOUNTER — TELEPHONE (OUTPATIENT)
Dept: CARDIOLOGY | Facility: MEDICAL CENTER | Age: 82
End: 2022-10-11
Payer: MEDICARE

## 2022-10-11 NOTE — TELEPHONE ENCOUNTER
Received letter from new PCP Dr. Rubio Lange. PCP requesting BE prescribe Repatha to patient as it was previously prescribed by old PCP. Most recent labs scanned into media.             To BE: Please advise

## 2022-10-11 NOTE — TELEPHONE ENCOUNTER
BE    Caller: Gloria    Topic/issue: Pt was returning your call. Tried your extension, but went to .    Callback Number: [phone]

## 2022-10-11 NOTE — TELEPHONE ENCOUNTER
Message  Received: Today  HAO Costa R.N.  Caller: Unspecified (Yesterday,  4:16 PM)  That's fine if previously prescribed. If new prescription, recommend referral to lipid clinic for initiation. SC   ________________________________________________________________    Left message for both patient and her spouse to return call and discuss further.

## 2022-10-12 NOTE — TELEPHONE ENCOUNTER
Phone Number Called: [phone]    Call outcome: Spoke to patient regarding message below.    Message: Called to follow up with patient that she has been on the Repatha. Patient states she has been on the Repatha for years. Patient does not know current dose. On file is previous prescription for 140 MG/ ML 1ML under the skin every 30 days.    TO SC: OK to send to pharmacy as previously prescribed?

## 2022-10-12 NOTE — TELEPHONE ENCOUNTER
BE        Caller: Milan (pt's )    Topic/issue: Patient's  was returning our call and was asking for a call back    Callback Number: [phone]      Thank you    -Gerry SERRANO

## 2022-11-04 ENCOUNTER — TELEPHONE (OUTPATIENT)
Dept: CARDIOLOGY | Facility: MEDICAL CENTER | Age: 82
End: 2022-11-04

## 2022-11-04 NOTE — TELEPHONE ENCOUNTER
BE        Caller: Milan (pt's )      Topic/issue: Patient's  was calling about the pre authorization about the patient's repatha medication and he was wanting to know what was happening with it      Callback Number: [phone]      Thank you    -Gerry SERRANO

## 2022-11-16 NOTE — TELEPHONE ENCOUNTER
"BE    Caller: Garrett Herndon ()    Topic/issue: HAFSA    Garrett states that [facility] has denied the request to fill this medication because it was sent over a \"new therapy\". Milan states that this medication needs to be sent over as a continued form of therapy in order for them to fill the medication. Please advise.    Thank you,  Kendall ÁLVAREZ    Callback Number: [phone]    "

## 2022-11-17 ENCOUNTER — TELEPHONE (OUTPATIENT)
Dept: CARDIOLOGY | Facility: MEDICAL CENTER | Age: 82
End: 2022-11-17
Payer: MEDICARE

## 2022-11-28 ENCOUNTER — TELEPHONE (OUTPATIENT)
Dept: CARDIOLOGY | Facility: MEDICAL CENTER | Age: 82
End: 2022-11-28
Payer: MEDICARE

## 2022-11-28 DIAGNOSIS — E78.5 DYSLIPIDEMIA: ICD-10-CM

## 2022-11-29 NOTE — TELEPHONE ENCOUNTER
TO SC: Received fax from [facility] stating pt has a history of Repatha 1 injection every 14 days. Our last on file was Repatha 1 injection Q30 days. Ok to update to most recent prescription of 1 injections Q14 days or would you like patient referred to Lipid Clinic?

## 2022-12-06 ENCOUNTER — APPOINTMENT (OUTPATIENT)
Dept: RADIOLOGY | Facility: MEDICAL CENTER | Age: 82
End: 2022-12-06
Attending: EMERGENCY MEDICINE
Payer: MEDICARE

## 2022-12-06 ENCOUNTER — APPOINTMENT (OUTPATIENT)
Dept: CARDIOLOGY | Facility: MEDICAL CENTER | Age: 82
End: 2022-12-06
Attending: INTERNAL MEDICINE
Payer: MEDICARE

## 2022-12-06 ENCOUNTER — HOSPITAL ENCOUNTER (EMERGENCY)
Facility: MEDICAL CENTER | Age: 82
End: 2022-12-06
Attending: EMERGENCY MEDICINE
Payer: MEDICARE

## 2022-12-06 VITALS
WEIGHT: 130 LBS | HEIGHT: 67 IN | TEMPERATURE: 98 F | OXYGEN SATURATION: 94 % | RESPIRATION RATE: 20 BRPM | SYSTOLIC BLOOD PRESSURE: 93 MMHG | HEART RATE: 58 BPM | BODY MASS INDEX: 20.4 KG/M2 | DIASTOLIC BLOOD PRESSURE: 45 MMHG

## 2022-12-06 DIAGNOSIS — I48.0 PAROXYSMAL ATRIAL FIBRILLATION (HCC): ICD-10-CM

## 2022-12-06 DIAGNOSIS — I48.91 ATRIAL FIBRILLATION, UNSPECIFIED TYPE (HCC): ICD-10-CM

## 2022-12-06 LAB
ALBUMIN SERPL BCP-MCNC: 4 G/DL (ref 3.2–4.9)
ALBUMIN/GLOB SERPL: 1.7 G/DL
ALP SERPL-CCNC: 89 U/L (ref 30–99)
ALT SERPL-CCNC: 30 U/L (ref 2–50)
ANION GAP SERPL CALC-SCNC: 10 MMOL/L (ref 7–16)
AST SERPL-CCNC: 31 U/L (ref 12–45)
BASOPHILS # BLD AUTO: 0.6 % (ref 0–1.8)
BASOPHILS # BLD: 0.04 K/UL (ref 0–0.12)
BILIRUB SERPL-MCNC: 0.6 MG/DL (ref 0.1–1.5)
BUN SERPL-MCNC: 22 MG/DL (ref 8–22)
CALCIUM SERPL-MCNC: 9.2 MG/DL (ref 8.5–10.5)
CHLORIDE SERPL-SCNC: 104 MMOL/L (ref 96–112)
CO2 SERPL-SCNC: 23 MMOL/L (ref 20–33)
CREAT SERPL-MCNC: 0.85 MG/DL (ref 0.5–1.4)
EKG IMPRESSION: NORMAL
EKG IMPRESSION: NORMAL
EOSINOPHIL # BLD AUTO: 0.18 K/UL (ref 0–0.51)
EOSINOPHIL NFR BLD: 2.7 % (ref 0–6.9)
ERYTHROCYTE [DISTWIDTH] IN BLOOD BY AUTOMATED COUNT: 41.7 FL (ref 35.9–50)
GFR SERPLBLD CREATININE-BSD FMLA CKD-EPI: 68 ML/MIN/1.73 M 2
GLOBULIN SER CALC-MCNC: 2.4 G/DL (ref 1.9–3.5)
GLUCOSE SERPL-MCNC: 95 MG/DL (ref 65–99)
HCT VFR BLD AUTO: 41.7 % (ref 37–47)
HGB BLD-MCNC: 14.2 G/DL (ref 12–16)
IMM GRANULOCYTES # BLD AUTO: 0.01 K/UL (ref 0–0.11)
IMM GRANULOCYTES NFR BLD AUTO: 0.2 % (ref 0–0.9)
LYMPHOCYTES # BLD AUTO: 2.25 K/UL (ref 1–4.8)
LYMPHOCYTES NFR BLD: 34.2 % (ref 22–41)
MCH RBC QN AUTO: 30.1 PG (ref 27–33)
MCHC RBC AUTO-ENTMCNC: 34.1 G/DL (ref 33.6–35)
MCV RBC AUTO: 88.3 FL (ref 81.4–97.8)
MONOCYTES # BLD AUTO: 0.46 K/UL (ref 0–0.85)
MONOCYTES NFR BLD AUTO: 7 % (ref 0–13.4)
NEUTROPHILS # BLD AUTO: 3.64 K/UL (ref 2–7.15)
NEUTROPHILS NFR BLD: 55.3 % (ref 44–72)
NRBC # BLD AUTO: 0 K/UL
NRBC BLD-RTO: 0 /100 WBC
PLATELET # BLD AUTO: 289 K/UL (ref 164–446)
PMV BLD AUTO: 10.4 FL (ref 9–12.9)
POTASSIUM SERPL-SCNC: 4.6 MMOL/L (ref 3.6–5.5)
PROT SERPL-MCNC: 6.4 G/DL (ref 6–8.2)
RBC # BLD AUTO: 4.72 M/UL (ref 4.2–5.4)
SODIUM SERPL-SCNC: 137 MMOL/L (ref 135–145)
WBC # BLD AUTO: 6.6 K/UL (ref 4.8–10.8)

## 2022-12-06 PROCEDURE — 93312 ECHO TRANSESOPHAGEAL: CPT

## 2022-12-06 PROCEDURE — A9270 NON-COVERED ITEM OR SERVICE: HCPCS | Performed by: INTERNAL MEDICINE

## 2022-12-06 PROCEDURE — 99285 EMERGENCY DEPT VISIT HI MDM: CPT | Mod: 25 | Performed by: INTERNAL MEDICINE

## 2022-12-06 PROCEDURE — 93312 ECHO TRANSESOPHAGEAL: CPT | Mod: 26 | Performed by: INTERNAL MEDICINE

## 2022-12-06 PROCEDURE — 93005 ELECTROCARDIOGRAM TRACING: CPT | Performed by: EMERGENCY MEDICINE

## 2022-12-06 PROCEDURE — 92960 CARDIOVERSION ELECTRIC EXT: CPT

## 2022-12-06 PROCEDURE — 700102 HCHG RX REV CODE 250 W/ 637 OVERRIDE(OP): Performed by: INTERNAL MEDICINE

## 2022-12-06 PROCEDURE — 99285 EMERGENCY DEPT VISIT HI MDM: CPT

## 2022-12-06 PROCEDURE — 700111 HCHG RX REV CODE 636 W/ 250 OVERRIDE (IP): Performed by: EMERGENCY MEDICINE

## 2022-12-06 PROCEDURE — 80053 COMPREHEN METABOLIC PANEL: CPT

## 2022-12-06 PROCEDURE — 93005 ELECTROCARDIOGRAM TRACING: CPT | Performed by: INTERNAL MEDICINE

## 2022-12-06 PROCEDURE — 36415 COLL VENOUS BLD VENIPUNCTURE: CPT

## 2022-12-06 PROCEDURE — 93005 ELECTROCARDIOGRAM TRACING: CPT

## 2022-12-06 PROCEDURE — 85025 COMPLETE CBC W/AUTO DIFF WBC: CPT

## 2022-12-06 PROCEDURE — 96374 THER/PROPH/DIAG INJ IV PUSH: CPT | Mod: XU

## 2022-12-06 PROCEDURE — 92960 CARDIOVERSION ELECTRIC EXT: CPT | Performed by: INTERNAL MEDICINE

## 2022-12-06 PROCEDURE — 71045 X-RAY EXAM CHEST 1 VIEW: CPT

## 2022-12-06 RX ORDER — ACETAMINOPHEN 325 MG/1
650 TABLET ORAL
Status: SHIPPED | COMMUNITY
End: 2023-03-12

## 2022-12-06 RX ORDER — FLECAINIDE ACETATE 50 MG/1
50 TABLET ORAL 2 TIMES DAILY
Qty: 60 TABLET | Refills: 0 | Status: SHIPPED | OUTPATIENT
Start: 2022-12-06 | End: 2022-12-09 | Stop reason: SDUPTHER

## 2022-12-06 RX ORDER — ETOMIDATE 2 MG/ML
9 INJECTION INTRAVENOUS ONCE
Status: COMPLETED | OUTPATIENT
Start: 2022-12-06 | End: 2022-12-06

## 2022-12-06 RX ORDER — FLECAINIDE ACETATE 50 MG/1
50 TABLET ORAL TWICE DAILY
Status: DISCONTINUED | OUTPATIENT
Start: 2022-12-06 | End: 2022-12-06 | Stop reason: HOSPADM

## 2022-12-06 RX ORDER — FLECAINIDE ACETATE 50 MG/1
50 TABLET ORAL 2 TIMES DAILY
Status: SHIPPED | COMMUNITY
End: 2022-12-06

## 2022-12-06 RX ADMIN — ETOMIDATE 9 MG: 2 INJECTION INTRAVENOUS at 15:55

## 2022-12-06 RX ADMIN — FLECAINIDE ACETATE 50 MG: 50 TABLET ORAL at 15:37

## 2022-12-06 RX ADMIN — APIXABAN 2.5 MG: 5 TABLET, FILM COATED ORAL at 15:37

## 2022-12-06 ASSESSMENT — FIBROSIS 4 INDEX: FIB4 SCORE: 2.93

## 2022-12-06 ASSESSMENT — CHA2DS2 SCORE
AGE 65 TO 74: NO
CHA2DS2 VASC SCORE: 7
DIABETES: NO
HYPERTENSION: YES
CHF OR LEFT VENTRICULAR DYSFUNCTION: NO
PRIOR STROKE OR TIA OR THROMBOEMBOLISM: YES
AGE 75 OR GREATER: YES
VASCULAR DISEASE: YES
SEX: FEMALE

## 2022-12-06 NOTE — ED TRIAGE NOTES
"Chief Complaint   Patient presents with    Palpitations     Pt reports increased palpitations this am. Hx of afib and has been trying adjust medications at home. Denies CP or SOB.      /56   Pulse 88   Temp 36.4 °C (97.6 °F) (Temporal)   Resp 18   Ht 1.702 m (5' 7\")   Wt 59 kg (130 lb)   SpO2 93%   BMI 20.36 kg/m²     Pt brought in by EMS, transfer from [facility] to [address], mask in place. Pt in a gown and on monitor. Chart flagged for ERP to see.    PIV established,  pta.   "

## 2022-12-06 NOTE — ED PROVIDER NOTES
ED Physician Note    Chief Concern:   Palpitations, atrial fibrillation    HPI:  Gloria Herndon is a very pleasant 82-year-old woman who presents to the emergency department for evaluation of palpitations and atrial fibrillation.  She does have a past medical history significant for atrial fibrillation, she has had an ablation previously.  She is followed by Dr. Jesus, cardiologist with [facility].  For about the past month she has felt her heart rate flip into and out of a normal sinus rhythm.  She states that she has not had a sustained normal sinus rhythm for the past month, usually her atrial fibrillation rate is well controlled, between 80 and 100, however it has gotten as high as the 180s to 190s.  She states she does not become symptomatic until her heart rate is in the 190s, then she becomes lightheaded especially when standing.  Otherwise she states she is asymptomatic, but can feel the palpitations.  She is in atrial fibrillation on arrival to the emergency department today, she is not entirely certain as to when this specific episode began, however she states that she has not been in sustained sinus rhythm for at least a month.  She has had no associated fevers, she reports no history of endocrine disorder, she is unable to identify any triggering factors nor alleviating factors.  She states that she tried to schedule a follow-up appointment with her cardiologist, but has had difficulty doing so.  There was no acute event that precipitated her visit to the emergency department today, she states that she got tired of being in atrial fibrillation and wanted to have it addressed.  She is currently on Xarelto, and has been taking that medication.  She was also prescribed flecainide, but she states that the flecainide gives her some stomach upset, so she was trying not to take that.  She was told that if she went into atrial fibrillation she should take her flecainide, so she did take a few  "doses over the past few days, however that has not helped her symptoms.    Review of Systems:  See HPI for pertinent positives and negatives. All other systems negative.    Past Medical History:   has a past medical history of Arrhythmia, Atrial fibrillation (HCC), History of cardiac radiofrequency ablation (12/01/2017), and TIA (transient ischemic attack) (10/2020).    Social History:  Social History     Tobacco Use    Smoking status: Never    Smokeless tobacco: Never   Vaping Use    Vaping Use: Never used   Substance and Sexual Activity    Alcohol use: Not Currently     Comment: very rare glass of wine    Drug use: Not Currently    Sexual activity: Not on file       Surgical History:   has a past surgical history that includes other cardiac surgery.    Current Medications:  Home Medications       Reviewed by Tali Solis (Pharmacy Tech) on 12/06/22 at 1333  Med List Status: Complete     Medication Last Dose Status   acetaminophen (TYLENOL) 325 MG Tab 12/3/2022 Active   estrogens, conjugated (PREMARIN) 0.3 MG Tab 12/4/2022 Active   Evolocumab, REPATHA, 140 MG/ML Solution Auto-injector 12 weeks ago Active   flecainide (TAMBOCOR) 50 MG tablet 12/6/2022 Active   metoprolol SR (TOPROL XL) 100 MG TABLET SR 24 HR 12/5/2022 Active   rivaroxaban (XARELTO) 20 MG Tab tablet 12/5/2022 Active                    Allergies:  Allergies   Allergen Reactions    Blue Dyes Vomiting    Eliquis [Apixaban] Unspecified     Unknown reaction pt states she was hospitalized     Green Dyes Vomiting    Iodine      Other reaction(s): Other (See Comments)  unsure    Ethyl Alcohol, Skin Unspecified     unspecified    Iodine Solution [Povidone Iodine] Unspecified     Red spots, pain    Omeprazole Unspecified     Urinary incontinence     Tape Unspecified     Welts on skin       Physical Exam:  Vital Signs: /70   Pulse (!) 54   Temp 36.4 °C (97.6 °F) (Temporal)   Resp 16   Ht 1.702 m (5' 7\")   Wt 59 kg (130 lb)   SpO2 93%   BMI " 20.36 kg/m²   Constitutional: Alert, no acute distress  HENT: Normocephalic, mask in place  Eyes: Pupils equal and reactive, normal conjunctiva  Neck: Supple, normal range of motion, no stridor  Cardiovascular: Irregularly irregular rhythm, tachycardic rate ranging from the 80s to the 140s, no appreciable murmur, extremities are warm and well perfused, normal mentation  Pulmonary: No respiratory distress, normal work of breathing, no accessory muscule usage, breath sounds clear and equal bilaterally, no wheezing, no coarse breath sounds  Abdomen: Soft, non-distended, non-tender to palpation  Skin: Warm, dry, no rashes or lesions  Musculoskeletal: Normal range of motion in all extremities, no swelling or deformity noted, no significant lower extremity edema  Neurologic: Alert, oriented, normal speech, normal motor function  Psychiatric: Normal and appropriate mood and affect    Medical records reviewed for continuity of care. Cardiology clinic note reviewed from 9/21/2022.  She was seen by Dr. Jesus, cardiologist.  She has a past medical history significant for atrial fibrillation, as well as transient ischemic attack.  At that time, she had been maintaining sinus rhythm without antiarrhythmic therapy, and was not tolerating a 5-day a week Xarelto regimen.  Noted that she had recently undergone a cardioversion after developing recurrent A. fib following COVID.  Xarelto use was complicated by epistaxis.    EKG: Rate 111, atrial fibrillation, irregularly irregular rhythm, no significant ST elevation or depression, there are some T wave inversions present in the V1 and V2, this is not significantly changed from prior EKG, low voltage in limb leads.    Labs:  Labs Reviewed   CBC WITH DIFFERENTIAL   COMP METABOLIC PANEL   ESTIMATED GFR   URINALYSIS       Radiology:  DX-CHEST-PORTABLE (1 VIEW)   Final Result      No acute cardiopulmonary disease evident.      EC-TAMICA W/O CONT    (Results Pending)   CL-CARDIOVERSION     (Results Pending)        ED Medications Administered:  Medications   apixaban (ELIQUIS) tablet 2.5 mg (2.5 mg Oral Given 12/6/22 1537)   flecainide (TAMBOCOR) tablet 50 mg (50 mg Oral Given 12/6/22 1537)   etomidate (AMIDATE) injection 9 mg (9 mg Intravenous Given 12/6/22 1555)       Differential diagnosis:  Atrial fibrillation, electrolyte abnormality, hypokalemia or hyperkalemia, heart failure    MDM:  Ms. Herndon presents to the emergency department today for evaluation of atrial fibrillation.  She has been in atrial fibrillation intermittently for the last month, she has been anticoagulated on Xarelto which she states she takes 5 days a week, skipping Saturdays and Tuesdays.  She has also been on flecainide for the past few days, she did take her flecainide this morning.  She is not having any new or acute symptoms, states that as long as her heart rate is below the 190s she is typically asymptomatic.  Heart rate has ranged between the 80s in the 150s in the emergency department today, and she has remained asymptomatic.  She is afebrile, she does not report any history concerning for infectious etiology.    On laboratory evaluation CBC and CMP are entirely within normal limits.  She has no electrolyte abnormalities.  Her potassium today is 4.6.    Chest x-ray demonstrates no acute process.    I discussed her presentation with Dr. Turner, cardiologist on-call this afternoon.  He kindly evaluated Ms. Herndon in the emergency department, with plan for sedation, transesophageal echo, and cardioversion.  This was performed without complication.  Please see Dr. Turner's note for full details of TAMICA/cardioversion. Procedural sedation documented below.     Procedure was successful, she remained in normal sinus rhythm.  On recommendation of cardiology, her Xarelto will be transitioned to reduced dose Eliquis.  She will start daily flecainide.  She was provided with prescriptions for both of these medications.  She  will follow-up with her cardiologist, Dr. Jesus, for consideration for ablation. Return precautions were discussed with the patient, and provided in written form with the patient's discharge instructions.       CONSCIOUS SEDATION PROCEDURE NOTE:  Patient identification was confirmed and patient consent was obtained  Indication: Procedural sedation for transesophageal echocardiogram and cardioversion due to atrial fibrillation  The procedure was performed by Dr. Ugarte  Anesthetic used: Etomidate  Length of Time: Please see nursing notes for full timeline of procedure  Other medications administered: None required  Pre-procedure neuro examination revealed no deficits. Patient's airway remained patent, O2SAT adequate through procedure. Vitals remained stable. Patient tolerated procedure well without complications. Post-procedure exam showed patient w/o cranial deficits. Sensory and motor intact. Patient returned to baseline prior to disposition.  Instructions for care discussed verbally and pt provided with additional written instructions for homecare and f/u.    Disposition:  Discharge home in stable condition    Final Impression:  1. Paroxysmal atrial fibrillation (HCC)    2. Atrial fibrillation, unspecified type (HCC)        Electronically signed by Shy Ugarte MD

## 2022-12-06 NOTE — ED NOTES
Med Rec Complete per patient & family member at bedside with Rx bottles of meds  Allergies Reviewed with patient  No antibiotics within the last 30 days  Patient's Preferred Pharmacy:  [facility].     Patient takes Xarelto 20 mg everyday of the week except Tuesday & Saturday.

## 2022-12-07 NOTE — DISCHARGE INSTRUCTIONS
Please schedule follow-up appointment with your cardiology clinic.  Call tomorrow morning to schedule follow-up appointment.  Return to the emergency department if you develop any new or worsening symptoms including chest pain, shortness of breath, lightheadedness, nausea or vomiting, fevers, or any further concerns.

## 2022-12-07 NOTE — PROCEDURES
Electrical Cardioversion    Date/Time: 12/6/2022 4:07 PM  Performed by: Mikey Turner M.D.  Authorized by: Mikey Turner M.D.     Consent:     Consent obtained:  Verbal and written    Consent given by:  Patient    Risks discussed:  Cutaneous burn, death, induced arrhythmia and pain    Alternatives discussed:  No treatment, rate-control medication, anti-coagulation medication, alternative treatment, observation, delayed treatment and referral  Sedation:     Patient sedated: Yes      Sedation type:  Moderate (conscious) sedation  Pre-procedure details:     Cardioversion basis:  Elective    Rhythm:  Atrial fibrillation    Electrode placement:  Anterior-posterior    Anticoagulation status:  Apixaban  Attempt one:     Cardioversion mode:  Synchronous    Waveform:  Biphasic    Shock (Joules):  200    Shock outcome:  Conversion to normal sinus rhythm  Post-procedure details:     Patient status:  Awake    Patient tolerance of procedure:  Tolerated well, no immediate complications  Comments:      Sedation per Dr Ugarte.

## 2022-12-07 NOTE — CONSULTS
Reason for Consult:  Asked by Dr Shy Ugarte M.D. to see this patient with AF RVR    CC:   Chief Complaint   Patient presents with    Palpitations     Pt reports increased palpitations this am. Hx of afib and has been trying adjust medications at home. Denies CP or SOB.        HPI:      82 year old woman with PMH TIA and paroxysmal AF presents with palpitations. Found AF RVR. Consult for further management. Patient symptomatic to palpitations, dyspnea, and if very rapid dizziness. No other cardiac complaints. Compliant with medications but only takes xarelto 5 days per week as instructed. She also takes flecainide prn. Denies toxic social habits.    Medications / Drug list prior to admission:  No current facility-administered medications on file prior to encounter.     Current Outpatient Medications on File Prior to Encounter   Medication Sig Dispense Refill    flecainide (TAMBOCOR) 50 MG tablet Take 50 mg by mouth 2 times a day.      acetaminophen (TYLENOL) 325 MG Tab Take 650 mg by mouth 1 time a day as needed for Mild Pain.      Evolocumab, REPATHA, 140 MG/ML Solution Auto-injector Inject 1 Each under the skin every 14 days. 6 mL 3    metoprolol SR (TOPROL XL) 100 MG TABLET SR 24 HR Take 1 Tablet by mouth every day. 90 Tablet 3    rivaroxaban (XARELTO) 20 MG Tab tablet Take 1 Tablet by mouth with dinner. (Patient taking differently: Take 20 mg by mouth with dinner. Doesn't take medication Tuesday and Saturday) 90 Tablet 3    estrogens, conjugated (PREMARIN) 0.3 MG Tab Take 0.3 mg by mouth every day.         Current list of administered Medications:    Current Facility-Administered Medications:     apixaban (ELIQUIS) tablet 2.5 mg, 2.5 mg, Oral, BID, Mikey Turner M.D., 2.5 mg at 12/06/22 1537    flecainide (TAMBOCOR) tablet 50 mg, 50 mg, Oral, TWICE DAILY, Mikey Turner M.D., 50 mg at 12/06/22 1537    Current Outpatient Medications:     flecainide (TAMBOCOR) 50 MG tablet, Take 50 mg by mouth 2 times a  day., Disp: , Rfl:     acetaminophen (TYLENOL) 325 MG Tab, Take 650 mg by mouth 1 time a day as needed for Mild Pain., Disp: , Rfl:     Evolocumab, REPATHA, 140 MG/ML Solution Auto-injector, Inject 1 Each under the skin every 14 days., Disp: 6 mL, Rfl: 3    metoprolol SR (TOPROL XL) 100 MG TABLET SR 24 HR, Take 1 Tablet by mouth every day., Disp: 90 Tablet, Rfl: 3    rivaroxaban (XARELTO) 20 MG Tab tablet, Take 1 Tablet by mouth with dinner. (Patient taking differently: Take 20 mg by mouth with dinner. Doesn't take medication Tuesday and Saturday), Disp: 90 Tablet, Rfl: 3    estrogens, conjugated (PREMARIN) 0.3 MG Tab, Take 0.3 mg by mouth every day., Disp: , Rfl:     Past Medical History:   Diagnosis Date    Arrhythmia     Pt states she has Atrial Fib    Atrial fibrillation (HCC)     History of cardiac radiofrequency ablation 12/01/2017    TIA (transient ischemic attack) 10/2020       Past Surgical History:   Procedure Laterality Date    OTHER CARDIAC SURGERY      ablation       History reviewed. No pertinent family history.  Patient family history was personally reviewed, no pertinent family history to current presentation    Social History     Socioeconomic History    Marital status: Single     Spouse name: Not on file    Number of children: Not on file    Years of education: Not on file    Highest education level: Not on file   Occupational History    Not on file   Tobacco Use    Smoking status: Never    Smokeless tobacco: Never   Vaping Use    Vaping Use: Never used   Substance and Sexual Activity    Alcohol use: Not Currently     Comment: very rare glass of wine    Drug use: Not Currently    Sexual activity: Not on file   Other Topics Concern    Not on file   Social History Narrative    Not on file     Social Determinants of Health     Financial Resource Strain: Not on file   Food Insecurity: Not on file   Transportation Needs: Not on file   Physical Activity: Not on file   Stress: Not on file   Social  "Connections: Not on file   Intimate Partner Violence: Not on file   Housing Stability: Not on file       ALLERGIES:  Allergies   Allergen Reactions    Blue Dyes Vomiting    Eliquis [Apixaban] Unspecified     Unknown reaction pt states she was hospitalized     Green Dyes Vomiting    Iodine      Other reaction(s): Other (See Comments)  unsure    Ethyl Alcohol, Skin Unspecified     unspecified    Iodine Solution [Povidone Iodine] Unspecified     Red spots, pain    Omeprazole Unspecified     Urinary incontinence     Tape Unspecified     Welts on skin       Review of systems:  A complete review of symptoms was reviewed with patient. This is reviewed in H&P and PMH. ALL OTHERS reviewed and negative    Physical exam:  Patient Vitals for the past 24 hrs:   BP Temp Temp src Pulse Resp SpO2 Height Weight   12/06/22 1501 (!) 95/66 -- -- (!) 144 15 93 % -- --   12/06/22 1400 (!) 93/62 -- -- (!) 144 (!) 22 94 % -- --   12/06/22 1352 (!) 93/61 -- -- (!) 145 18 94 % -- --   12/06/22 1200 108/51 -- -- 98 16 94 % -- --   12/06/22 1136 114/56 36.4 °C (97.6 °F) Temporal 88 18 93 % 1.702 m (5' 7\") 59 kg (130 lb)     General: No acute distress.   EYES: no jaundice  HEENT: OP clear   Neck: No bruits No JVD.   CVS:  irreg. S1 + S2. No M/R/G. No edema.  Resp: CTAB. No wheezing or crackles/rhonchi.  Abdomen: Soft, NT, ND,  Skin: Grossly nothing acute no obvious rashes  Neurological: Alert, Moves all extremities, no cranial nerve defects on limited exam  Extremities: Pulse 2+ in b/l LE. No cyanosis.     Data:  Laboratory studies personally reviewed by me:  Recent Results (from the past 24 hour(s))   CBC WITH DIFFERENTIAL    Collection Time: 12/06/22 11:36 AM   Result Value Ref Range    WBC 6.6 4.8 - 10.8 K/uL    RBC 4.72 4.20 - 5.40 M/uL    Hemoglobin 14.2 12.0 - 16.0 g/dL    Hematocrit 41.7 37.0 - 47.0 %    MCV 88.3 81.4 - 97.8 fL    MCH 30.1 27.0 - 33.0 pg    MCHC 34.1 33.6 - 35.0 g/dL    RDW 41.7 35.9 - 50.0 fL    Platelet Count 289 164 - " 446 K/uL    MPV 10.4 9.0 - 12.9 fL    Neutrophils-Polys 55.30 44.00 - 72.00 %    Lymphocytes 34.20 22.00 - 41.00 %    Monocytes 7.00 0.00 - 13.40 %    Eosinophils 2.70 0.00 - 6.90 %    Basophils 0.60 0.00 - 1.80 %    Immature Granulocytes 0.20 0.00 - 0.90 %    Nucleated RBC 0.00 /100 WBC    Neutrophils (Absolute) 3.64 2.00 - 7.15 K/uL    Lymphs (Absolute) 2.25 1.00 - 4.80 K/uL    Monos (Absolute) 0.46 0.00 - 0.85 K/uL    Eos (Absolute) 0.18 0.00 - 0.51 K/uL    Baso (Absolute) 0.04 0.00 - 0.12 K/uL    Immature Granulocytes (abs) 0.01 0.00 - 0.11 K/uL    NRBC (Absolute) 0.00 K/uL   CMP    Collection Time: 22 11:36 AM   Result Value Ref Range    Sodium 137 135 - 145 mmol/L    Potassium 4.6 3.6 - 5.5 mmol/L    Chloride 104 96 - 112 mmol/L    Co2 23 20 - 33 mmol/L    Anion Gap 10.0 7.0 - 16.0    Glucose 95 65 - 99 mg/dL    Bun 22 8 - 22 mg/dL    Creatinine 0.85 0.50 - 1.40 mg/dL    Calcium 9.2 8.5 - 10.5 mg/dL    AST(SGOT) 31 12 - 45 U/L    ALT(SGPT) 30 2 - 50 U/L    Alkaline Phosphatase 89 30 - 99 U/L    Total Bilirubin 0.6 0.1 - 1.5 mg/dL    Albumin 4.0 3.2 - 4.9 g/dL    Total Protein 6.4 6.0 - 8.2 g/dL    Globulin 2.4 1.9 - 3.5 g/dL    A-G Ratio 1.7 g/dL   ESTIMATED GFR    Collection Time: 22 11:36 AM   Result Value Ref Range    GFR (CKD-EPI) 68 >60 mL/min/1.73 m 2   EKG    Collection Time: 22 11:45 AM   Result Value Ref Range    Report       [facility] Emergency Dept.    Test Date:  2022  Pt Name:    LAST TAVERAS                  Department: ER  MRN:        [id number]                      Room:       [facility]  Gender:     Female                       Technician: [id number]  :        [id number]                   Requested By:ER TRIAGE PROTOCOL  Order #:    [id number]                    Reading MD: ENRIQUE CANCINO MD    Measurements  Intervals                                Axis  Rate:       111                          P:  ID:                                      QRS:         -71  QRSD:       109                          T:          -19  QT:         378  QTc:        514    Interpretive Statements  Rate 111, atrial fibrillation, irregularly irregular rhythm, no significant  ST  elevation or depression, there are some T wave inversions present in the V1  and  V2, this is not significantly changed from prior EKG, low voltage in limb  leads.  Electronically Signed On 12-6-2022 14:05:59 PST by ENRIQUE CANCINO MD         EKG 12/6/22 AF RVR    All pertinent features of laboratory and imaging reviewed including primary images where applicable    TTE 10/7/22  CONCLUSIONS  No prior study is available for comparison.   Left ventricular ejection fraction is visually estimated to be 70%.  Estimated right ventricular systolic pressure is 25 mmHg.    Active Problems:    * No active hospital problems. *  Resolved Problems:    * No resolved hospital problems. *      Assessment / Plan:  82 year old woman with PMH TIA and paroxysmal AF presents with palpitations. Found AF RVR. Consult for further management.     -convert to reduced dose eliquis 2.5mg bid due to age and weight  -start daily flecainide 50mg bid.   -continue home metoprolol  -TAMICA/DCCV  -follow up outpatient cardiology Dr Jesus. Functional stress outpatient on flecainide. Please consider rhythm control with ablation by EP.    I personally discussed her case with Dr Cancino.    It is my pleasure to participate in the care of Ms. Herndon.  Please do not hesitate to contact me with questions or concerns.    Mikey Turner MD  Cardiologist [facility]    A total of 62 minutes of time was spent on day of encounter reviewing medical record, performing history and examination, counseling, ordering medication/test/consults and documentation.

## 2022-12-07 NOTE — ED NOTES
ERP and cardiology at bedside to explain procedure, consent given. All monitoring and airway equipment present with assist staff, time out called at 1552, correct pt and procedure verified. TAMICA completed. Cardioversion 200j at 1601. VS remained stable, see chart. Pt able to maintain own airway during procedure. See MAR for medications given.

## 2022-12-09 ENCOUNTER — OFFICE VISIT (OUTPATIENT)
Dept: CARDIOLOGY | Facility: MEDICAL CENTER | Age: 82
End: 2022-12-09
Payer: MEDICARE

## 2022-12-09 VITALS
BODY MASS INDEX: 21.5 KG/M2 | SYSTOLIC BLOOD PRESSURE: 102 MMHG | DIASTOLIC BLOOD PRESSURE: 60 MMHG | HEART RATE: 63 BPM | RESPIRATION RATE: 15 BRPM | HEIGHT: 67 IN | OXYGEN SATURATION: 95 % | WEIGHT: 137 LBS

## 2022-12-09 DIAGNOSIS — I48.91 ATRIAL FIBRILLATION, UNSPECIFIED TYPE (HCC): ICD-10-CM

## 2022-12-09 DIAGNOSIS — E78.5 DYSLIPIDEMIA: ICD-10-CM

## 2022-12-09 DIAGNOSIS — G31.84 MILD COGNITIVE IMPAIRMENT: ICD-10-CM

## 2022-12-09 DIAGNOSIS — Z86.79 HISTORY OF ATRIAL FIBRILLATION: ICD-10-CM

## 2022-12-09 DIAGNOSIS — G45.9 TIA (TRANSIENT ISCHEMIC ATTACK): ICD-10-CM

## 2022-12-09 DIAGNOSIS — Z82.49 FAMILY HISTORY OF HEART DISEASE: ICD-10-CM

## 2022-12-09 PROCEDURE — 99214 OFFICE O/P EST MOD 30 MIN: CPT | Performed by: INTERNAL MEDICINE

## 2022-12-09 RX ORDER — FLECAINIDE ACETATE 50 MG/1
50 TABLET ORAL 2 TIMES DAILY
Qty: 180 TABLET | Refills: 3 | Status: SHIPPED | OUTPATIENT
Start: 2022-12-09 | End: 2023-01-08

## 2022-12-09 ASSESSMENT — ENCOUNTER SYMPTOMS
SPUTUM PRODUCTION: 0
BRUISES/BLEEDS EASILY: 0
ORTHOPNEA: 0
CHILLS: 0
EYES NEGATIVE: 1
NEUROLOGICAL NEGATIVE: 1
CONSTITUTIONAL NEGATIVE: 1
LOSS OF CONSCIOUSNESS: 0
RESPIRATORY NEGATIVE: 1
PND: 0
MUSCULOSKELETAL NEGATIVE: 1
HEMOPTYSIS: 0
COUGH: 0
STRIDOR: 0
CARDIOVASCULAR NEGATIVE: 1
WHEEZING: 0
FEVER: 0
PALPITATIONS: 0
WEAKNESS: 0
GASTROINTESTINAL NEGATIVE: 1
CLAUDICATION: 0
DIZZINESS: 0
SHORTNESS OF BREATH: 0
SORE THROAT: 0

## 2022-12-09 ASSESSMENT — FIBROSIS 4 INDEX: FIB4 SCORE: 1.63

## 2022-12-09 NOTE — PROGRESS NOTES
Chief Complaint   Patient presents with    Atrial Fibrillation     F/V DX: History of atrial fibrillation       Subjective     Gloria Herndon is a 82 y.o. female who presents today as a follow-up from a prior provider for history of atrial fibrillation.  She was recently started on the flecainide if she developed atrial fibrillation with rapid ventricular response.  She is also been on the metoprolol.  She had been compliant with oral anticoagulation.  At home she watches her heart rate with a apple watch.  She is otherwise been well.    Past Medical History:   Diagnosis Date    Arrhythmia     Pt states she has Atrial Fib    Atrial fibrillation (HCC)     History of cardiac radiofrequency ablation 12/01/2017    TIA (transient ischemic attack) 10/2020     Past Surgical History:   Procedure Laterality Date    OTHER CARDIAC SURGERY      ablation     History reviewed. No pertinent family history.  Social History     Socioeconomic History    Marital status: Single     Spouse name: Not on file    Number of children: Not on file    Years of education: Not on file    Highest education level: Not on file   Occupational History    Not on file   Tobacco Use    Smoking status: Never    Smokeless tobacco: Never   Vaping Use    Vaping Use: Never used   Substance and Sexual Activity    Alcohol use: Not Currently     Comment: very rare glass of wine    Drug use: Not Currently    Sexual activity: Not on file   Other Topics Concern    Not on file   Social History Narrative    Not on file     Social Determinants of Health     Financial Resource Strain: Not on file   Food Insecurity: Not on file   Transportation Needs: Not on file   Physical Activity: Not on file   Stress: Not on file   Social Connections: Not on file   Intimate Partner Violence: Not on file   Housing Stability: Not on file     Allergies   Allergen Reactions    Blue Dyes Vomiting    Eliquis [Apixaban] Unspecified     Unknown reaction pt states she was  hospitalized     Green Dyes Vomiting    Iodine      Other reaction(s): Other (See Comments)  unsure    Ethyl Alcohol, Skin Unspecified     unspecified    Iodine Solution [Povidone Iodine] Unspecified     Red spots, pain    Omeprazole Unspecified     Urinary incontinence     Tape Unspecified     Welts on skin     Outpatient Encounter Medications as of 12/9/2022   Medication Sig Dispense Refill    apixaban (ELIQUIS) 2.5mg Tab Take 1 Tablet by mouth 2 times a day. 60 Tablet 11    flecainide (TAMBOCOR) 50 MG tablet Take 1 Tablet by mouth 2 times a day for 30 days. 180 Tablet 3    acetaminophen (TYLENOL) 325 MG Tab Take 650 mg by mouth 1 time a day as needed for Mild Pain.      Evolocumab, REPATHA, 140 MG/ML Solution Auto-injector Inject 1 Each under the skin every 14 days. 6 mL 3    metoprolol SR (TOPROL XL) 100 MG TABLET SR 24 HR Take 1 Tablet by mouth every day. 90 Tablet 3    estrogens, conjugated (PREMARIN) 0.3 MG Tab Take 0.3 mg by mouth every day.      [DISCONTINUED] apixaban (ELIQUIS) 5mg Tab Take 0.5 Tablets by mouth 2 times a day for 30 days. 30 Tablet 0    [DISCONTINUED] flecainide (TAMBOCOR) 50 MG tablet Take 1 Tablet by mouth 2 times a day for 30 days. 60 Tablet 0     No facility-administered encounter medications on file as of 12/9/2022.     Review of Systems   Constitutional: Negative.  Negative for chills, fever and malaise/fatigue.   HENT: Negative.  Negative for sore throat.    Eyes: Negative.    Respiratory: Negative.  Negative for cough, hemoptysis, sputum production, shortness of breath, wheezing and stridor.    Cardiovascular: Negative.  Negative for chest pain, palpitations, orthopnea, claudication, leg swelling and PND.   Gastrointestinal: Negative.    Genitourinary: Negative.    Musculoskeletal: Negative.    Skin: Negative.    Neurological: Negative.  Negative for dizziness, loss of consciousness and weakness.   Endo/Heme/Allergies: Negative.  Does not bruise/bleed easily.   All other systems  "reviewed and are negative.           Objective     /60 (BP Location: Right arm, Patient Position: Sitting, BP Cuff Size: Adult)   Pulse 63   Resp 15   Ht 1.702 m (5' 7.01\")   Wt 62.1 kg (137 lb)   SpO2 95%   BMI 21.45 kg/m²     Physical Exam  Vitals and nursing note reviewed.   Constitutional:       General: She is not in acute distress.     Appearance: She is well-developed. She is not diaphoretic.   HENT:      Head: Normocephalic and atraumatic.      Right Ear: External ear normal.      Left Ear: External ear normal.      Nose: Nose normal.      Mouth/Throat:      Pharynx: No oropharyngeal exudate.   Eyes:      General: No scleral icterus.        Right eye: No discharge.         Left eye: No discharge.      Conjunctiva/sclera: Conjunctivae normal.      Pupils: Pupils are equal, round, and reactive to light.   Neck:      Vascular: No JVD.   Cardiovascular:      Rate and Rhythm: Normal rate and regular rhythm.      Heart sounds: No murmur heard.    No friction rub. No gallop.   Pulmonary:      Effort: Pulmonary effort is normal. No respiratory distress.      Breath sounds: No stridor. No wheezing or rales.   Chest:      Chest wall: No tenderness.   Abdominal:      General: There is no distension.      Palpations: Abdomen is soft.      Tenderness: There is no guarding.   Musculoskeletal:         General: No tenderness or deformity. Normal range of motion.      Cervical back: Neck supple.   Skin:     General: Skin is warm and dry.      Coloration: Skin is not pale.      Findings: No erythema or rash.   Neurological:      Mental Status: She is alert.      Cranial Nerves: No cranial nerve deficit.      Motor: No abnormal muscle tone.      Coordination: Coordination normal.      Deep Tendon Reflexes: Reflexes are normal and symmetric. Reflexes normal.   Psychiatric:         Behavior: Behavior normal.         Thought Content: Thought content normal.         Judgment: Judgment normal.          Echocardiogram: " Dated 10/7/2020 personally reviewed inter myself showing normal LV systolic function no valvular heart disease.    CT chest: Dated 6/23/2022 personally viewed inter myself showing mild plaque in the LAD low risk score for a 2-year-old female    Lab Results   Component Value Date/Time    CHOLSTRLTOT 179 10/07/2020 05:50 AM    LDL 81 10/07/2020 05:50 AM    HDL 81 10/07/2020 05:50 AM    TRIGLYCERIDE 87 10/07/2020 05:50 AM       Lab Results   Component Value Date/Time    SODIUM 137 12/06/2022 11:36 AM    POTASSIUM 4.6 12/06/2022 11:36 AM    CHLORIDE 104 12/06/2022 11:36 AM    CO2 23 12/06/2022 11:36 AM    GLUCOSE 95 12/06/2022 11:36 AM    BUN 22 12/06/2022 11:36 AM    CREATININE 0.85 12/06/2022 11:36 AM     Lab Results   Component Value Date/Time    ALKPHOSPHAT 89 12/06/2022 11:36 AM    ASTSGOT 31 12/06/2022 11:36 AM    ALTSGPT 30 12/06/2022 11:36 AM    TBILIRUBIN 0.6 12/06/2022 11:36 AM          Assessment & Plan     1. History of atrial fibrillation        2. TIA (transient ischemic attack)  apixaban (ELIQUIS) 2.5mg Tab      3. Dyslipidemia  apixaban (ELIQUIS) 2.5mg Tab      4. Family history of heart disease  apixaban (ELIQUIS) 2.5mg Tab      5. Mild cognitive impairment        6. Atrial fibrillation, unspecified type (HCC)  flecainide (TAMBOCOR) 50 MG tablet          Medical Decision Making: Today's Assessment/Status/Plan:        82-year-old female with paroxysmal atrial fibrillation on flecainide.  I reviewed her CT scan which appears unremarkable.  Therefore she has a little bit low burden of plaque and I think it safe to continue.  I did advise her that if she has breakthrough atrial fibrillation she can take an additional 2 doses of flecainide.  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  I did advise her that she can take up to 300 mg/day of the flecainide.  We also keep her on the oral anticoagulation.  I will see her back in 6 months.

## 2022-12-09 NOTE — PATIENT INSTRUCTIONS
If having breakthrough a-fib, take 2 extra flecainide (50 mg).  Maximum dose in 24 hours in 300 mg.

## 2023-01-17 ENCOUNTER — APPOINTMENT (RX ONLY)
Dept: URBAN - METROPOLITAN AREA CLINIC 38 | Facility: CLINIC | Age: 83
Setting detail: DERMATOLOGY
End: 2023-01-17

## 2023-01-17 DIAGNOSIS — D22 MELANOCYTIC NEVI: ICD-10-CM

## 2023-01-17 DIAGNOSIS — L82.1 OTHER SEBORRHEIC KERATOSIS: ICD-10-CM

## 2023-01-17 DIAGNOSIS — L81.4 OTHER MELANIN HYPERPIGMENTATION: ICD-10-CM

## 2023-01-17 DIAGNOSIS — Z71.89 OTHER SPECIFIED COUNSELING: ICD-10-CM

## 2023-01-17 DIAGNOSIS — Z85.828 PERSONAL HISTORY OF OTHER MALIGNANT NEOPLASM OF SKIN: ICD-10-CM

## 2023-01-17 DIAGNOSIS — D18.0 HEMANGIOMA: ICD-10-CM

## 2023-01-17 PROBLEM — D18.01 HEMANGIOMA OF SKIN AND SUBCUTANEOUS TISSUE: Status: ACTIVE | Noted: 2023-01-17

## 2023-01-17 PROBLEM — D22.9 MELANOCYTIC NEVI, UNSPECIFIED: Status: ACTIVE | Noted: 2023-01-17

## 2023-01-17 PROCEDURE — ? COUNSELING

## 2023-01-17 PROCEDURE — 99213 OFFICE O/P EST LOW 20 MIN: CPT

## 2023-01-17 ASSESSMENT — LOCATION DETAILED DESCRIPTION DERM
LOCATION DETAILED: PERIUMBILICAL SKIN
LOCATION DETAILED: RIGHT MEDIAL TRAPEZIAL NECK
LOCATION DETAILED: RIGHT INFERIOR UPPER BACK
LOCATION DETAILED: INFERIOR MID FOREHEAD
LOCATION DETAILED: LEFT DISTAL DORSAL FOREARM
LOCATION DETAILED: RIGHT PROXIMAL DORSAL FOREARM
LOCATION DETAILED: LEFT CLAVICULAR NECK

## 2023-01-17 ASSESSMENT — LOCATION SIMPLE DESCRIPTION DERM
LOCATION SIMPLE: ABDOMEN
LOCATION SIMPLE: RIGHT FOREARM
LOCATION SIMPLE: LEFT ANTERIOR NECK
LOCATION SIMPLE: POSTERIOR NECK
LOCATION SIMPLE: LEFT FOREARM
LOCATION SIMPLE: INFERIOR FOREHEAD
LOCATION SIMPLE: RIGHT UPPER BACK

## 2023-01-17 ASSESSMENT — LOCATION ZONE DERM
LOCATION ZONE: TRUNK
LOCATION ZONE: FACE
LOCATION ZONE: ARM
LOCATION ZONE: NECK

## 2023-01-23 ENCOUNTER — TELEPHONE (OUTPATIENT)
Dept: CARDIOLOGY | Facility: MEDICAL CENTER | Age: 83
End: 2023-01-23
Payer: MEDICARE

## 2023-01-23 DIAGNOSIS — R00.2 PALPITATIONS: ICD-10-CM

## 2023-01-23 RX ORDER — FLECAINIDE ACETATE 100 MG/1
100 TABLET ORAL 2 TIMES DAILY
Qty: 60 TABLET | Refills: 2 | Status: SHIPPED | OUTPATIENT
Start: 2023-01-23 | End: 2023-01-24

## 2023-01-23 NOTE — TELEPHONE ENCOUNTER
Phone Number Called: [phone]    Call outcome: Spoke to patient regarding message below.    Message: Called to return patient's phone call.     Patient reports she is constantly in and out of a-fib according to her apple watch. She has been taking Flecainide as directed, but is now out of Flecainide.     Patient reports shortness of breath with a fib episodes, but no other symptoms.     ER precautions reviewed with patient. Patient verbalized understanding.     RN to reach out to RO for further recommendations.

## 2023-01-23 NOTE — TELEPHONE ENCOUNTER
MEDICATION REFILL : RO    Caller: Sandi Pelonwhitliz    Medication Name and Dosage:     FLECAINIDE 50MG    Please call your pharmacy and have them send us a refill request or speak to a live representative, RX number may have changed.    Medication amount left: NONE    Preferred Pharmacy:     [id number] [place], [place] - [address] [facility]    Other questions (Topic): AFIB    Patient states that she has been going in and out of AFIB every other day and she has been adding almost 6 tabs of this medication to her regimen due to this afib as instructed. Please advise.    Thank you.  Kendall ÁLVAREZ     Callback Number (Will only call for issues): [phone] (home) [phone] (work)

## 2023-01-24 RX ORDER — FLECAINIDE ACETATE 100 MG/1
100 TABLET ORAL 2 TIMES DAILY
Qty: 60 TABLET | Refills: 2 | Status: SHIPPED
Start: 2023-01-24 | End: 2023-05-01

## 2023-01-24 NOTE — TELEPHONE ENCOUNTER
Phone Number Called: [phone]    Call outcome: Spoke to patient regarding message below.    Message: Called to inform patient of RO recommendations.     RO verbal orders: Flecainide 100 mg twice a day. EP referral.     Patient verbalized understanding.     ER precautions reviewed with patient. Patient verbalized understanding.     Orders placed per RO recommendations.

## 2023-01-24 NOTE — TELEPHONE ENCOUNTER
Demond,    This patient is scheduled to see Dr. Lozada on 2-6-23.     The Flecainide was sent in to [facility], patient is requesting it be sent to the [facility]. Can you please send this prescription to the [facility] so she can get started on it?    Thank You,  Ifeoma

## 2023-01-24 NOTE — TELEPHONE ENCOUNTER
Prescription for Flecainide sent to [facility]'s pharmacy per patient request. [employer] message sent to patient.

## 2023-02-06 ENCOUNTER — OFFICE VISIT (OUTPATIENT)
Dept: CARDIOLOGY | Facility: MEDICAL CENTER | Age: 83
End: 2023-02-06
Payer: MEDICARE

## 2023-02-06 VITALS
SYSTOLIC BLOOD PRESSURE: 110 MMHG | RESPIRATION RATE: 14 BRPM | HEART RATE: 52 BPM | HEIGHT: 67 IN | DIASTOLIC BLOOD PRESSURE: 70 MMHG | BODY MASS INDEX: 21.35 KG/M2 | WEIGHT: 136 LBS | OXYGEN SATURATION: 96 %

## 2023-02-06 DIAGNOSIS — R00.2 PALPITATIONS: ICD-10-CM

## 2023-02-06 PROCEDURE — 99204 OFFICE O/P NEW MOD 45 MIN: CPT | Mod: 25 | Performed by: INTERNAL MEDICINE

## 2023-02-06 PROCEDURE — 93000 ELECTROCARDIOGRAM COMPLETE: CPT | Performed by: INTERNAL MEDICINE

## 2023-02-06 ASSESSMENT — FIBROSIS 4 INDEX: FIB4 SCORE: 1.63

## 2023-02-07 NOTE — PROGRESS NOTES
Arrhythmia Clinic Note (New patient)     DOS: 2/6/2023    Referring physician: Dr See    Chief complaint/Reason for consult: Afib    HPI: 83 y/o F with pAF and prior ablation at [facility] in 2017, recurrent pAF managed with flecainide, 2 cardioversions the past 6 months, failing with breakthrough now up to 100mg PO BID and stable for the time being. Referred to EP to discuss. She is not excited about further procedures and likewise is hesitant to try alternative medications. Retired ID doctor.    ROS (+ highlighted in bold):  Constitutional: Fevers/chills/fatigue/weightloss  HEENT: Blurry vision/eye pain/sore throat/hearing loss  Respiratory: Shortness of breath/cough  Cardiovascular: Chest pain/palpitations/edema/orthopnea/syncope  GI: Nausea/vomitting/diarrhea  MSK: Arthralgias/myagias/muscle weakness  Skin: Rash/sores  Neurological: Numbness/tremors/vertigo  Endocrine: Excessive thirst/polyuria/cold intolerance/heat intolerance  Psych: Depression/anxiety    Past Medical History:   Diagnosis Date    Arrhythmia     Pt states she has Atrial Fib    Atrial fibrillation (HCC)     History of cardiac radiofrequency ablation 12/01/2017    TIA (transient ischemic attack) 10/2020       Past Surgical History:   Procedure Laterality Date    OTHER CARDIAC SURGERY      ablation       Social History     Socioeconomic History    Marital status:      Spouse name: Not on file    Number of children: Not on file    Years of education: Not on file    Highest education level: Not on file   Occupational History    Not on file   Tobacco Use    Smoking status: Never    Smokeless tobacco: Never   Vaping Use    Vaping Use: Never used   Substance and Sexual Activity    Alcohol use: Not Currently     Comment: very rare glass of wine    Drug use: Not Currently    Sexual activity: Not on file   Other Topics Concern    Not on file   Social History Narrative    Not on file     Social Determinants of Health     Financial Resource Strain: Not  "on file   Food Insecurity: Not on file   Transportation Needs: Not on file   Physical Activity: Not on file   Stress: Not on file   Social Connections: Not on file   Intimate Partner Violence: Not on file   Housing Stability: Not on file       History reviewed. No pertinent family history.    Allergies   Allergen Reactions    Blue Dyes Vomiting    Eliquis [Apixaban] Unspecified     Unknown reaction pt states she was hospitalized     Green Dyes Vomiting    Iodine      Other reaction(s): Other (See Comments)  unsure    Ethyl Alcohol, Skin Unspecified     unspecified    Iodine Solution [Povidone Iodine] Unspecified     Red spots, pain    Omeprazole Unspecified     Urinary incontinence     Tape Unspecified     Welts on skin       Current Outpatient Medications   Medication Sig Dispense Refill    flecainide (TAMBOCOR) 100 MG Tab Take 1 Tablet by mouth 2 times a day. 60 Tablet 2    apixaban (ELIQUIS) 2.5mg Tab Take 1 Tablet by mouth 2 times a day. 60 Tablet 11    acetaminophen (TYLENOL) 325 MG Tab Take 650 mg by mouth 1 time a day as needed for Mild Pain.      Evolocumab, REPATHA, 140 MG/ML Solution Auto-injector Inject 1 Each under the skin every 14 days. 6 mL 3    metoprolol SR (TOPROL XL) 100 MG TABLET SR 24 HR Take 1 Tablet by mouth every day. 90 Tablet 3    estrogens, conjugated (PREMARIN) 0.3 MG Tab Take 0.3 mg by mouth every day.       No current facility-administered medications for this visit.       Physical Exam:  Vitals:    02/06/23 1404   BP: 110/70   BP Location: Left arm   Patient Position: Sitting   BP Cuff Size: Adult   Pulse: (!) 52   Resp: 14   SpO2: 96%   Weight: 61.7 kg (136 lb)   Height: 1.702 m (5' 7\")     General appearance: NAD, conversant   Eyes: anicteric sclerae, moist conjunctivae; no lid-lag; PERRLA  HENT: Atraumatic; oropharynx clear with moist mucous membranes and no mucosal ulcerations; normal hard and soft palate  Neck: Trachea midline; FROM, supple, no thyromegaly or " lymphadenopathy  Lungs: CTA, with normal respiratory effort and no intercostal retractions  CV: RRR, no MRGs, no JVD   Abdomen: Soft, non-tender; no masses or HSM  Extremities: No peripheral edema or extremity lymphadenopathy  Skin: Normal temperature, turgor and texture; no rash, ulcers or subcutaneous nodules  Psych: Appropriate affect, alert and oriented to person, place and time    Data:  Lipids:   Lab Results   Component Value Date/Time    CHOLSTRLTOT 179 10/07/2020 05:50 AM    TRIGLYCERIDE 87 10/07/2020 05:50 AM    HDL 81 10/07/2020 05:50 AM    LDL 81 10/07/2020 05:50 AM        BMP:  Lab Results   Component Value Date/Time    SODIUM 137 12/06/2022 1136    POTASSIUM 4.6 12/06/2022 1136    CHLORIDE 104 12/06/2022 1136    CO2 23 12/06/2022 1136    GLUCOSE 95 12/06/2022 1136    BUN 22 12/06/2022 1136    CREATININE 0.85 12/06/2022 1136    CALCIUM 9.2 12/06/2022 1136    ANION 10.0 12/06/2022 1136        TSH:   No results found for: TSHULTRASEN     THYROXINE (T4):   No results found for: FREEDIR     CBC:   Lab Results   Component Value Date/Time    WBC 6.6 12/06/2022 11:36 AM    RBC 4.72 12/06/2022 11:36 AM    HEMOGLOBIN 14.2 12/06/2022 11:36 AM    HEMATOCRIT 41.7 12/06/2022 11:36 AM    MCV 88.3 12/06/2022 11:36 AM    MCH 30.1 12/06/2022 11:36 AM    MCHC 34.1 12/06/2022 11:36 AM    RDW 41.7 12/06/2022 11:36 AM    PLATELETCT 289 12/06/2022 11:36 AM    MPV 10.4 12/06/2022 11:36 AM    NEUTSPOLYS 55.30 12/06/2022 11:36 AM    LYMPHOCYTES 34.20 12/06/2022 11:36 AM    MONOCYTES 7.00 12/06/2022 11:36 AM    EOSINOPHILS 2.70 12/06/2022 11:36 AM    BASOPHILS 0.60 12/06/2022 11:36 AM    IMMGRAN 0.20 12/06/2022 11:36 AM    NRBC 0.00 12/06/2022 11:36 AM    NEUTS 3.64 12/06/2022 11:36 AM    LYMPHS 2.25 12/06/2022 11:36 AM    MONOS 0.46 12/06/2022 11:36 AM    EOS 0.18 12/06/2022 11:36 AM    BASO 0.04 12/06/2022 11:36 AM    IMMGRANAB 0.01 12/06/2022 11:36 AM    NRBCAB 0.00 12/06/2022 11:36 AM        CBC w/o DIFF  Lab Results    Component Value Date/Time    WBC 6.6 12/06/2022 11:36 AM    RBC 4.72 12/06/2022 11:36 AM    HEMOGLOBIN 14.2 12/06/2022 11:36 AM    MCV 88.3 12/06/2022 11:36 AM    MCH 30.1 12/06/2022 11:36 AM    MCHC 34.1 12/06/2022 11:36 AM    RDW 41.7 12/06/2022 11:36 AM    MPV 10.4 12/06/2022 11:36 AM       Prior echo/stress results reviewed: TAMICA with no IRINEO clot    EKG interpreted by me: SR with 1dAVB, compared to prior ECG 1dAVB is worse    Impression/Plan:  1. Palpitations  EKG        pAF  High risk medication use   Hypercoagulable state due to afib    - I would not increase flecainide further with AV delay  - Discussed redo ablation if further breakthrough  - For now they will continue the current dose and call to schedule a FV in 6 months  - Continue dose reduced Jf Lozada MD  Cardiac Electrophysiology

## 2023-02-14 LAB — EKG IMPRESSION: NORMAL

## 2023-03-12 ENCOUNTER — HOSPITAL ENCOUNTER (EMERGENCY)
Facility: MEDICAL CENTER | Age: 83
End: 2023-03-12
Attending: EMERGENCY MEDICINE
Payer: MEDICARE

## 2023-03-12 ENCOUNTER — APPOINTMENT (OUTPATIENT)
Dept: RADIOLOGY | Facility: MEDICAL CENTER | Age: 83
End: 2023-03-12
Attending: EMERGENCY MEDICINE
Payer: MEDICARE

## 2023-03-12 VITALS
SYSTOLIC BLOOD PRESSURE: 96 MMHG | HEART RATE: 69 BPM | BODY MASS INDEX: 21.45 KG/M2 | HEIGHT: 67 IN | RESPIRATION RATE: 18 BRPM | OXYGEN SATURATION: 96 % | TEMPERATURE: 97.5 F | DIASTOLIC BLOOD PRESSURE: 57 MMHG | WEIGHT: 136.69 LBS

## 2023-03-12 DIAGNOSIS — I48.0 INTERMITTENT ATRIAL FIBRILLATION (HCC): ICD-10-CM

## 2023-03-12 LAB
ALBUMIN SERPL BCP-MCNC: 3.9 G/DL (ref 3.2–4.9)
ALBUMIN/GLOB SERPL: 1.5 G/DL
ALP SERPL-CCNC: 110 U/L (ref 30–99)
ALT SERPL-CCNC: 46 U/L (ref 2–50)
ANION GAP SERPL CALC-SCNC: 10 MMOL/L (ref 7–16)
AST SERPL-CCNC: 36 U/L (ref 12–45)
BASOPHILS # BLD AUTO: 0.7 % (ref 0–1.8)
BASOPHILS # BLD: 0.06 K/UL (ref 0–0.12)
BILIRUB SERPL-MCNC: 0.3 MG/DL (ref 0.1–1.5)
BUN SERPL-MCNC: 23 MG/DL (ref 8–22)
CALCIUM ALBUM COR SERPL-MCNC: 9.4 MG/DL (ref 8.5–10.5)
CALCIUM SERPL-MCNC: 9.3 MG/DL (ref 8.4–10.2)
CHLORIDE SERPL-SCNC: 104 MMOL/L (ref 96–112)
CO2 SERPL-SCNC: 26 MMOL/L (ref 20–33)
CREAT SERPL-MCNC: 1.01 MG/DL (ref 0.5–1.4)
EKG IMPRESSION: NORMAL
EKG IMPRESSION: NORMAL
EOSINOPHIL # BLD AUTO: 0.12 K/UL (ref 0–0.51)
EOSINOPHIL NFR BLD: 1.4 % (ref 0–6.9)
ERYTHROCYTE [DISTWIDTH] IN BLOOD BY AUTOMATED COUNT: 38.8 FL (ref 35.9–50)
GFR SERPLBLD CREATININE-BSD FMLA CKD-EPI: 55 ML/MIN/1.73 M 2
GLOBULIN SER CALC-MCNC: 2.6 G/DL (ref 1.9–3.5)
GLUCOSE SERPL-MCNC: 117 MG/DL (ref 65–99)
HCT VFR BLD AUTO: 43.2 % (ref 37–47)
HGB BLD-MCNC: 14.8 G/DL (ref 12–16)
IMM GRANULOCYTES # BLD AUTO: 0.02 K/UL (ref 0–0.11)
IMM GRANULOCYTES NFR BLD AUTO: 0.2 % (ref 0–0.9)
LIPASE SERPL-CCNC: 25 U/L (ref 7–58)
LYMPHOCYTES # BLD AUTO: 2.08 K/UL (ref 1–4.8)
LYMPHOCYTES NFR BLD: 24.8 % (ref 22–41)
MCH RBC QN AUTO: 30.8 PG (ref 27–33)
MCHC RBC AUTO-ENTMCNC: 34.3 G/DL (ref 33.6–35)
MCV RBC AUTO: 89.8 FL (ref 81.4–97.8)
MONOCYTES # BLD AUTO: 0.51 K/UL (ref 0–0.85)
MONOCYTES NFR BLD AUTO: 6.1 % (ref 0–13.4)
NEUTROPHILS # BLD AUTO: 5.61 K/UL (ref 2–7.15)
NEUTROPHILS NFR BLD: 66.8 % (ref 44–72)
NRBC # BLD AUTO: 0 K/UL
NRBC BLD-RTO: 0 /100 WBC
PLATELET # BLD AUTO: 281 K/UL (ref 164–446)
PMV BLD AUTO: 9.8 FL (ref 9–12.9)
POTASSIUM SERPL-SCNC: 4.2 MMOL/L (ref 3.6–5.5)
PROT SERPL-MCNC: 6.5 G/DL (ref 6–8.2)
RBC # BLD AUTO: 4.81 M/UL (ref 4.2–5.4)
SODIUM SERPL-SCNC: 140 MMOL/L (ref 135–145)
TROPONIN T SERPL-MCNC: <6 NG/L (ref 6–19)
WBC # BLD AUTO: 8.4 K/UL (ref 4.8–10.8)

## 2023-03-12 PROCEDURE — 80053 COMPREHEN METABOLIC PANEL: CPT

## 2023-03-12 PROCEDURE — 93005 ELECTROCARDIOGRAM TRACING: CPT | Performed by: EMERGENCY MEDICINE

## 2023-03-12 PROCEDURE — 93005 ELECTROCARDIOGRAM TRACING: CPT

## 2023-03-12 PROCEDURE — 94760 N-INVAS EAR/PLS OXIMETRY 1: CPT

## 2023-03-12 PROCEDURE — 83690 ASSAY OF LIPASE: CPT

## 2023-03-12 PROCEDURE — 700105 HCHG RX REV CODE 258: Performed by: EMERGENCY MEDICINE

## 2023-03-12 PROCEDURE — 84484 ASSAY OF TROPONIN QUANT: CPT

## 2023-03-12 PROCEDURE — 85025 COMPLETE CBC W/AUTO DIFF WBC: CPT

## 2023-03-12 PROCEDURE — 36415 COLL VENOUS BLD VENIPUNCTURE: CPT

## 2023-03-12 PROCEDURE — 99285 EMERGENCY DEPT VISIT HI MDM: CPT

## 2023-03-12 PROCEDURE — 71045 X-RAY EXAM CHEST 1 VIEW: CPT

## 2023-03-12 PROCEDURE — 700111 HCHG RX REV CODE 636 W/ 250 OVERRIDE (IP): Performed by: EMERGENCY MEDICINE

## 2023-03-12 PROCEDURE — 96374 THER/PROPH/DIAG INJ IV PUSH: CPT

## 2023-03-12 RX ORDER — SODIUM CHLORIDE 9 MG/ML
1000 INJECTION, SOLUTION INTRAVENOUS ONCE
Status: COMPLETED | OUTPATIENT
Start: 2023-03-12 | End: 2023-03-12

## 2023-03-12 RX ORDER — DILTIAZEM HYDROCHLORIDE 5 MG/ML
0.25 INJECTION INTRAVENOUS ONCE
Status: COMPLETED | OUTPATIENT
Start: 2023-03-12 | End: 2023-03-12

## 2023-03-12 RX ORDER — FLECAINIDE ACETATE 150 MG/1
150 TABLET ORAL 2 TIMES DAILY
Qty: 60 TABLET | Refills: 0 | Status: SHIPPED | OUTPATIENT
Start: 2023-03-12 | End: 2023-05-01

## 2023-03-12 RX ADMIN — DILTIAZEM HYDROCHLORIDE 15.5 MG: 5 INJECTION INTRAVENOUS at 16:33

## 2023-03-12 RX ADMIN — SODIUM CHLORIDE 1000 ML: 9 INJECTION, SOLUTION INTRAVENOUS at 17:15

## 2023-03-12 ASSESSMENT — FIBROSIS 4 INDEX
FIB4 SCORE: 1.63
FIB4 SCORE: 1.63

## 2023-03-12 NOTE — ED PROVIDER NOTES
ER Provider Note    Scribed for João Martinez D.O. by Lety Cristobal. 3/12/2023  4:06 PM    Primary Care Provider: Jimy Lange M.D.    CHIEF COMPLAINT  Chief Complaint   Patient presents with    Palpitations     Has a personal history of A-fib; felt palpitations yesterday       HPI/ROS  LIMITATION TO HISTORY   none  OUTSIDE HISTORIAN(S):   at bedside who has a list of procedures and medications.     Gloria Herndon is a 82 y.o. female who presents to the ED complaining of atrial fibrillation warning on her watch onset two days ago. Per patient, she could feel, in her arm, a jiggling sensation. She has an electric watch that alerted her that she was having atrial fibrillation. She was told that if she has atrial fibrillation for more than 12 hours, she should take flecainide. If it does not stop in 12 hours, she should present to the ED. Per patient, her atrial fibrillation comes and goes. This time, she notes that she has had Atrial Fibrillation for two days. She denies nausea, vomiting, abdominal pain, chest pain, difficulty breathing, irregular heartbeats, dizziness, or lightheadedness. The patient notes she has been shocked and cardioverted before. She had one ablation on December 1st, 2017. Her last cardioversion was in December. She had had some spells of atrial fibrillation and her flecainide was increased to 200 mg flecainide per day to treat. The patient is currently on elliquis and has not been missing any doses.  No alleviating or exacerbating factors reported. The patient denies a history of a heart attack.     ROS as per HPI.    PAST MEDICAL HISTORY  Past Medical History:   Diagnosis Date    Arrhythmia     Pt states she has Atrial Fib    Atrial fibrillation (HCC)     History of cardiac radiofrequency ablation 12/01/2017    TIA (transient ischemic attack) 10/2020       SURGICAL HISTORY  Past Surgical History:   Procedure Laterality Date    OTHER CARDIAC SURGERY      ablation  "      FAMILY HISTORY  None noted when reviewed.    SOCIAL HISTORY   reports that she has never smoked. She has never used smokeless tobacco. She reports that she does not currently use alcohol. She reports that she does not currently use drugs.    CURRENT MEDICATIONS  Discharge Medication List as of 3/12/2023  6:37 PM        CONTINUE these medications which have NOT CHANGED    Details   !! flecainide (TAMBOCOR) 100 MG Tab Take 1 Tablet by mouth 2 times a day., Disp-60 Tablet, R-2, Normal      apixaban (ELIQUIS) 2.5mg Tab Take 1 Tablet by mouth 2 times a day., Disp-60 Tablet, R-11, Normal      Evolocumab, REPATHA, 140 MG/ML Solution Auto-injector Inject 1 Each under the skin every 14 days., Disp-6 mL, R-3, Normal      metoprolol SR (TOPROL XL) 100 MG TABLET SR 24 HR Take 1 Tablet by mouth every day., Disp-90 Tablet, R-3, Normal      estrogens, conjugated (PREMARIN) 0.3 MG Tab Take 0.3 mg by mouth every day., Historical Med       !! - Potential duplicate medications found. Please discuss with provider.          ALLERGIES  Blue dyes; Eliquis [apixaban]; Green dyes; Iodine; Ethyl alcohol, skin; Iodine solution [povidone iodine]; Omeprazole; and Tape    PHYSICAL EXAM  /54   Pulse 86 Comment: irregular  Temp 36.4 °C (97.5 °F) (Temporal)   Resp 18   Ht 1.702 m (5' 7\")   Wt 62 kg (136 lb 11 oz)   SpO2 96%   BMI 21.41 kg/m²     General: No acute distress.  HENT: Normocephalic, Mucus membranes are moist.   Chest: Lungs have even and unlabored respirations, Clear to auscultation.   Cardiovascular: Tachycardic Rate, Irregularly irregular rhythm, No peripheral cyanosis.  Abdomen: Non distended.  Neuro: Awake, Conversive, Able to relay recent events.  Psychiatric: Calm and cooperative.     EXTERNAL RECORDS REVIEWED  Normal left ventricular function     INITIAL ASSESSMENT  Patient presents with atrial fibrillation with a history of atrial fibrillation. This has been going on for two days based on her watch readings. " She is asymptomatic and has no chest pain or shortness of breath. She is on Eliquis chronically and has been taking it. She has taken her initial flecainide and remains in atrial fibrillation. Her heart rate is tachycardic and is in atrial fibrillation. We will treat with Cardizem which may convert her while cardiac labs are pending.     ED Observation Status? Yes; I am placing the patient in to an observation status due to a diagnostic uncertainty as well as therapeutic intensity. Patient placed in observation status at 4:09 PM, 3/12/2023.     Observation plan is as follows: Will treat for rate control while cardiac evaluation is pending.     Upon Reevaluation, the patient's condition has: Improved; and will be discharged.    Patient discharged from ED Observation status at 5:51 PM (Time) 03/12/23 (Date).     DIAGNOSTIC STUDIES    Labs:   Results for orders placed or performed during the hospital encounter of 03/12/23   CBC w/ Differential   Result Value Ref Range    WBC 8.4 4.8 - 10.8 K/uL    RBC 4.81 4.20 - 5.40 M/uL    Hemoglobin 14.8 12.0 - 16.0 g/dL    Hematocrit 43.2 37.0 - 47.0 %    MCV 89.8 81.4 - 97.8 fL    MCH 30.8 27.0 - 33.0 pg    MCHC 34.3 33.6 - 35.0 g/dL    RDW 38.8 35.9 - 50.0 fL    Platelet Count 281 164 - 446 K/uL    MPV 9.8 9.0 - 12.9 fL    Neutrophils-Polys 66.80 44.00 - 72.00 %    Lymphocytes 24.80 22.00 - 41.00 %    Monocytes 6.10 0.00 - 13.40 %    Eosinophils 1.40 0.00 - 6.90 %    Basophils 0.70 0.00 - 1.80 %    Immature Granulocytes 0.20 0.00 - 0.90 %    Nucleated RBC 0.00 /100 WBC    Neutrophils (Absolute) 5.61 2.00 - 7.15 K/uL    Lymphs (Absolute) 2.08 1.00 - 4.80 K/uL    Monos (Absolute) 0.51 0.00 - 0.85 K/uL    Eos (Absolute) 0.12 0.00 - 0.51 K/uL    Baso (Absolute) 0.06 0.00 - 0.12 K/uL    Immature Granulocytes (abs) 0.02 0.00 - 0.11 K/uL    NRBC (Absolute) 0.00 K/uL   Complete Metabolic Panel (CMP)   Result Value Ref Range    Sodium 140 135 - 145 mmol/L    Potassium 4.2 3.6 - 5.5  mmol/L    Chloride 104 96 - 112 mmol/L    Co2 26 20 - 33 mmol/L    Anion Gap 10.0 7.0 - 16.0    Glucose 117 (H) 65 - 99 mg/dL    Bun 23 (H) 8 - 22 mg/dL    Creatinine 1.01 0.50 - 1.40 mg/dL    Calcium 9.3 8.4 - 10.2 mg/dL    AST(SGOT) 36 12 - 45 U/L    ALT(SGPT) 46 2 - 50 U/L    Alkaline Phosphatase 110 (H) 30 - 99 U/L    Total Bilirubin 0.3 0.1 - 1.5 mg/dL    Albumin 3.9 3.2 - 4.9 g/dL    Total Protein 6.5 6.0 - 8.2 g/dL    Globulin 2.6 1.9 - 3.5 g/dL    A-G Ratio 1.5 g/dL   Troponin STAT   Result Value Ref Range    Troponin T <6 6 - 19 ng/L   Lipase   Result Value Ref Range    Lipase 25 7 - 58 U/L   CORRECTED CALCIUM   Result Value Ref Range    Correct Calcium 9.4 8.5 - 10.5 mg/dL   ESTIMATED GFR   Result Value Ref Range    GFR (CKD-EPI) 55 (A) >60 mL/min/1.73 m 2   EKG   Result Value Ref Range    Report       [facility] Emergency Dept.    Test Date:  2023  Pt Name:    LAST ROJAS                  Department: [facility]  MRN:        [id number]                      [facility]  Gender:     Female                       Technician:   :        [id number]                   Requested By:ER TRIAGE PROTOCOL  Order #:    [id number]                    Reading MD:    Measurements  Intervals                                Axis  Rate:       122                          P:  NM:                                      QRS:        254  QRSD:       143                          T:          52  QT:         384  QTc:        547    Interpretive Statements  Atrial fibrillation  Nonspecific IVCD with LAD  Compared to ECG 2023 14:13:56  Intraventricular conduction delay now present  Sinus bradycardia no longer present  First degree AV block no longer present     EKG   Result Value Ref Range    Report       [facility] Emergency Dept.    Test Date:  2023  Pt Name:    LAST TAVERAS                  Department: [facility]  MRN:        [id number]                      Room:        -ROOM 7  Gender:     Female                       Technician: [id number]  :        [id number]                   Requested By:DEANN GREGORIO BOBBI  Order #:    [id number]                    Reading MD:    Measurements  Intervals                                Axis  Rate:       61                           P:  MI:                                      QRS:        -165  QRSD:       122                          T:          -12  QT:         465  QTc:        469    Interpretive Statements  Junctional rhythm  Right bundle branch block  Compared to ECG 2023 15:26:46  Junctional rhythm now present  Right bundle-branch block now present  Atrial fibrillation no longer present  Intraventricular conduction delay no longer present          EKG:   I have independently interpreted the above EKG.    Radiology:   The attending emergency physician has independently interpreted the diagnostic imaging associated with this visit and am waiting the final reading from the radiologist.   Preliminary interpretation is as follows: Negative  Radiologist interpretation:   DX-CHEST-PORTABLE (1 VIEW)   Final Result      No acute cardiac or pulmonary abnormalities are identified.            COURSE & MEDICAL DECISION MAKING     COURSE AND PLAN  3:50 PM - Patient seen and examined at bedside. Discussed plan of care, including treating with Cardizem, possibly cardioverting, and ordering EKG, labs, and radiology to evaluate. . Patient agrees to the plan of care. The patient will be medicated with Cardizem 15.5 mg via injection. Ordered for EKG, Lipase, Troponin STAT, CMP, CBC w/ Diff, and CXR to evaluate her symptoms.     4:01 PM - Ordered Estimated GFR and Corrected Calcium to evaluate. Patient verbalizes understanding and agreement to this plan of care.     4:47 PM - Treated patient with NS Infusion 1,000 mL    5:49 PM - I discussed the patient's case with Dr. Zimmerman (cardiologist). He followed up with the patient. The rate is controlled, heart rate is  at 60, and the patient has sinus bradycardia with junctional rhythm. Blood pressure had a transient decline which is now improved. Per Dr. Zimmerman, based on ekg, the patient is stable for discharge. Ambulate to ensure there is no concerns for syncope.     6:50 PM - Patient was Reevaluated at bedside. I discussed labs and radiology with the patient and informed them that  Dr. Zimmerman with the cardiology group will increase her flecainide to prevent recurrence of atrial fibrillation . I then informed them of plans for discharge. Patient had the opportunity to ask any questions. The plan for discharge was discussed with them and she was told to return for any new or worsening symptoms. She was also informed of the plans for follow up. Patient is understanding and agreeable to the plan for discharge.    HYDRATION: Based on the patient's presentation of Inability to take oral fluids the patient was given IV fluids. IV Hydration was used because oral hydration was not adequate alone. Upon recheck following hydration, the patient was improved.        ED Summary: Patient presents with a history of atrial fibrillation, she came in because her watch has been reading atrial fibrillation she is essentially asymptomatic of this.  My evaluation of her heart rate was 1 teens to 120.  I did give her Cardizem.  Her echocardiogram was reviewed prior to that and she has no signs of CHF.    With that her heart rate did go to 60s, is regular, it may be junctional versus sinus bradycardia.  Previous EKG shows a history of sinus bradycardia.    She is on Lopressor, she is on flecainide.  Her troponin is negative and she is not in atrial fibrillation now.  She is anticoagulated.    I spoke with the cardiologist on-call described EKG and the findings and events in the years recommending discharge home and to increase flecainide and to continue follow-up with the primary doctor.  The patient was ambulatory here without difficulty no dizziness or no  lightheadedness.  After the Cardizem she did have a transient episode of hypotension she received some IV fluids and this did resolve the hypotension she is stable for discharge home    ADDITIONAL PROBLEM LIST  None    DISPOSITION AND DISCUSSIONS  I have discussed management of the patient with the following physicians and DARREN's:  Dr. Zimmerman (Cardiology)    The patient will return for new or worsening symptoms and is stable at the time of discharge.    The patient is referred to a primary physician for blood pressure management, diabetic screening, and for all other preventative health concerns.    DISPOSITION:  Patient will be discharged home in stable condition.    FOLLOW UP:  Jimy Lange M.D.  [id number]  [place] [address]  [phone]            OUTPATIENT MEDICATIONS:  Discharge Medication List as of 3/12/2023  6:37 PM        START taking these medications    Details   !! flecainide (TAMBOCOR) 150 MG Tab Take 1 Tablet by mouth 2 times a day., Disp-60 Tablet, R-0, Normal       !! - Potential duplicate medications found. Please discuss with provider.           FINAL DIAGNOSIS  1. Intermittent atrial fibrillation (HCC)         Lety RENAE (Jolynn), am scribing for, and in the presence of, João Martinez D.O..    Electronically signed by: Lety Lofton), 3/12/2023    João RENAE D.O. personally performed the services described in this documentation, as scribed by Lety Cristobal in my presence, and it is both accurate and complete. C    The note accurately reflects work and decisions made by me.  João Martinez D.O.  3/12/2023  10:29 PM

## 2023-03-12 NOTE — ED NOTES
Med rec updated and complete, per pt   Allergies reviewed, per pt  Interviewed pt with  at bedside with permission from pt.  Pt reports no vitamins or OTC's in the last 30 days or longer.  Pt reports no antibiotics in the last 30 days.  Pt reports that she took her FLECAINIDE 100MG today @ 0700 and then another one @ 1400

## 2023-03-12 NOTE — ED NOTES
Pt medicated per ERP order, pt hypotensive at this time - ERP aware, no new orders, will monitor; pt asymptomatic

## 2023-03-12 NOTE — ED TRIAGE NOTES
Patient presents with complaints of palpitations yesterday. Has known A-fib and was advised by her cardiologist that if she has A-fib with palpitations, take an additional dose of Flecanide. If not improved  she was to go to the ER for evaluation for possible cardioversion. Had cardioversion last June and again in December. Denies chest pain and shortness of breath.

## 2023-03-13 NOTE — ED NOTES
Pt given d/c paperwork and RX p/u information; pt verbalized understanding all information given. Pt ambulated out of the ER w/o difficulty w/ spouse

## 2023-03-13 NOTE — DISCHARGE INSTRUCTIONS
You converted to sinus rhythm, your blood pressure and pulse have been good.    I spoke with Dr. Zimmerman with the cardiology group.  Your flecainide will be increased to help prevent recurrence of atrial fibrillation.  Return for any change or worsening symptoms or concerns.

## 2023-03-16 ENCOUNTER — PATIENT MESSAGE (OUTPATIENT)
Dept: CARDIOLOGY | Facility: MEDICAL CENTER | Age: 83
End: 2023-03-16
Payer: MEDICARE

## 2023-03-16 ENCOUNTER — TELEPHONE (OUTPATIENT)
Dept: CARDIOLOGY | Facility: MEDICAL CENTER | Age: 83
End: 2023-03-16

## 2023-03-16 NOTE — TELEPHONE ENCOUNTER
Caller: Dr. Rubio Lange    Topic/issue: Doctor called and stated he would like to speak to  about a mutal pt. He states this pt was just in the [facility] ER on 03- for afib. TT consulted her while there and told her to increase her script for flecainide (TAMBOCOR) 150 MG Tab, but stated to him that [facility] didn't want her to increase it. He is asking for [facility] to call him back for a few minutes to discuss.     Callback Number: In comments

## 2023-03-17 NOTE — PATIENT COMMUNICATION
Todd Lozada M.D.  You 21 hours ago (1:34 PM)     Pt should stay at 100mg bid          MyChart message sent to patient, awaiting patient response and will follow up as needed.

## 2023-03-21 ENCOUNTER — TELEPHONE (OUTPATIENT)
Dept: CARDIOLOGY | Facility: MEDICAL CENTER | Age: 83
End: 2023-03-21
Payer: MEDICARE

## 2023-03-21 DIAGNOSIS — E78.5 DYSLIPIDEMIA: ICD-10-CM

## 2023-03-21 DIAGNOSIS — G45.9 TIA (TRANSIENT ISCHEMIC ATTACK): ICD-10-CM

## 2023-03-21 DIAGNOSIS — Z82.49 FAMILY HISTORY OF HEART DISEASE: ICD-10-CM

## 2023-03-21 NOTE — TELEPHONE ENCOUNTER
Caller: nIo Gregory (Son)    Medication Name and Dosage:  apixaban (ELIQUIS) 2.5mg Tab    Medication amount left: 2 week left    Preferred Pharmacy: "[facility], Ph. [phone]    Other questions (Topic): [facility] states they need permission from the doctor to do a home delivery. Order number: [id number], Invoice number: [id number].    Callback Number (Will only call for issues): [phone]    Thank you,  -Blaire SERRANO

## 2023-04-04 ENCOUNTER — OFFICE VISIT (OUTPATIENT)
Dept: CARDIOLOGY | Facility: MEDICAL CENTER | Age: 83
End: 2023-04-04
Payer: MEDICARE

## 2023-04-04 VITALS
RESPIRATION RATE: 16 BRPM | HEART RATE: 55 BPM | SYSTOLIC BLOOD PRESSURE: 124 MMHG | HEIGHT: 67 IN | BODY MASS INDEX: 21.66 KG/M2 | WEIGHT: 138 LBS | DIASTOLIC BLOOD PRESSURE: 70 MMHG | OXYGEN SATURATION: 96 %

## 2023-04-04 DIAGNOSIS — R00.2 PALPITATIONS: ICD-10-CM

## 2023-04-04 PROCEDURE — 93000 ELECTROCARDIOGRAM COMPLETE: CPT | Performed by: INTERNAL MEDICINE

## 2023-04-04 PROCEDURE — 99214 OFFICE O/P EST MOD 30 MIN: CPT | Mod: 25 | Performed by: INTERNAL MEDICINE

## 2023-04-04 RX ORDER — CLONAZEPAM 0.5 MG/1
TABLET ORAL
COMMUNITY
Start: 2023-02-10

## 2023-04-04 ASSESSMENT — FIBROSIS 4 INDEX: FIB4 SCORE: 1.57

## 2023-04-04 NOTE — PROGRESS NOTES
Arrhythmia Clinic Note (Established patient)    DOS: 4/4/2023    Chief complaint/Reason for consult: Afib    Interval History: 81 y/o F with pAF, prior ablation in 2017 (PVI/PWI), with recurrent pAF managed on flecainide. She had another episode of pAF reverted to NSR in the ED last month. 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 She is doing OK and recently spaced out her medications to allow for better oral tolerance and since then has not had any further afib.     ROS (+ highlighted in bold):  Constitutional: Fevers/chills/fatigue/weightloss  HEENT: Blurry vision/eye pain/sore throat/hearing loss  Respiratory: Shortness of breath/cough  Cardiovascular: Chest pain/palpitations/edema/orthopnea/syncope  GI: Nausea/vomitting/diarrhea  MSK: Arthralgias/myagias/muscle weakness  Skin: Rash/sores  Neurological: Numbness/tremors/vertigo  Endocrine: Excessive thirst/polyuria/cold intolerance/heat intolerance  Psych: Depression/anxiety    Past Medical History:   Diagnosis Date    Arrhythmia     Pt states she has Atrial Fib    Atrial fibrillation (HCC)     History of cardiac radiofrequency ablation 12/01/2017    TIA (transient ischemic attack) 10/2020       Past Surgical History:   Procedure Laterality Date    OTHER CARDIAC SURGERY      ablation       Social History     Socioeconomic History    Marital status:      Spouse name: Not on file    Number of children: Not on file    Years of education: Not on file    Highest education level: Not on file   Occupational History    Not on file   Tobacco Use    Smoking status: Never    Smokeless tobacco: Never   Vaping Use    Vaping Use: Never used   Substance and Sexual Activity    Alcohol use: Not Currently     Comment: very rare glass of wine    Drug use: Not Currently    Sexual activity: Not on file   Other Topics Concern    Not on file   Social History Narrative    Not on file     Social Determinants of Health     Financial Resource Strain: Not on  "file   Food Insecurity: Not on file   Transportation Needs: Not on file   Physical Activity: Not on file   Stress: Not on file   Social Connections: Not on file   Intimate Partner Violence: Not on file   Housing Stability: Not on file       History reviewed. No pertinent family history.    Allergies   Allergen Reactions    Blue Dyes Vomiting    Eliquis [Apixaban] Unspecified     Pt does not have an allergie to this medication     Green Dyes Vomiting    Iodine Hives    Ethyl Alcohol, Skin Unspecified     Pt is not sure what happens     Iodine Solution [Povidone Iodine] Unspecified     Red spots, pain    Omeprazole Unspecified     Urinary incontinence     Tape Unspecified     Welts on skin, paper tape is ok       Current Outpatient Medications   Medication Sig Dispense Refill    clonazePAM (KLONOPIN) 0.5 MG Tab TAKE ONE TABLET BY MOUTH NIGHTLY AT BEDTIME AS NEEDED FOR INSOMNIA FOR 30 DAYS      apixaban (ELIQUIS) 2.5mg Tab Take 1 Tablet by mouth 2 times a day. 180 Tablet 3    flecainide (TAMBOCOR) 100 MG Tab Take 1 Tablet by mouth 2 times a day. (Patient taking differently: Take 100 mg by mouth 2 times a day. Pt took on tablet @ 0700 and then another one @ 1400 (3/12/2023)) 60 Tablet 2    Evolocumab, REPATHA, 140 MG/ML Solution Auto-injector Inject 1 Each under the skin every 14 days. 6 mL 3    metoprolol SR (TOPROL XL) 100 MG TABLET SR 24 HR Take 1 Tablet by mouth every day. 90 Tablet 3    estrogens, conjugated (PREMARIN) 0.3 MG Tab Take 0.3 mg by mouth every day.      flecainide (TAMBOCOR) 150 MG Tab Take 1 Tablet by mouth 2 times a day. (Patient not taking: Reported on 4/4/2023) 60 Tablet 0     No current facility-administered medications for this visit.       Physical Exam:  Vitals:    04/04/23 0851   BP: 124/70   BP Location: Left arm   Patient Position: Sitting   BP Cuff Size: Adult   Pulse: (!) 55   Resp: 16   SpO2: 96%   Weight: 62.6 kg (138 lb)   Height: 1.702 m (5' 7\")     General appearance: NAD, conversant "   Eyes: anicteric sclerae, moist conjunctivae; no lid-lag; PERRLA  HENT: Atraumatic; oropharynx clear with moist mucous membranes and no mucosal ulcerations; normal hard and soft palate  Neck: Trachea midline; FROM, supple, no thyromegaly or lymphadenopathy  Lungs: CTA, with normal respiratory effort and no intercostal retractions  CV: RRR, no MRGs, no JVD  Abdomen: Soft, non-tender; no masses or HSM  Extremities: No peripheral edema or extremity lymphadenopathy  Skin: Normal temperature, turgor and texture; no rash, ulcers or subcutaneous nodules  Psych: Appropriate affect, alert and oriented to person, place and time    Data:  Lipids:   Lab Results   Component Value Date/Time    CHOLSTRLTOT 179 10/07/2020 05:50 AM    TRIGLYCERIDE 87 10/07/2020 05:50 AM    HDL 81 10/07/2020 05:50 AM    LDL 81 10/07/2020 05:50 AM        BMP:  Lab Results   Component Value Date/Time    SODIUM 140 03/12/2023 1601    POTASSIUM 4.2 03/12/2023 1601    CHLORIDE 104 03/12/2023 1601    CO2 26 03/12/2023 1601    GLUCOSE 117 (H) 03/12/2023 1601    BUN 23 (H) 03/12/2023 1601    CREATININE 1.01 03/12/2023 1601    CALCIUM 9.3 03/12/2023 1601    ANION 10.0 03/12/2023 1601        TSH:   No results found for: TSHULTRASEN     THYROXINE (T4):   No results found for: FREEDIR     CBC:   Lab Results   Component Value Date/Time    WBC 8.4 03/12/2023 04:01 PM    RBC 4.81 03/12/2023 04:01 PM    HEMOGLOBIN 14.8 03/12/2023 04:01 PM    HEMATOCRIT 43.2 03/12/2023 04:01 PM    MCV 89.8 03/12/2023 04:01 PM    MCH 30.8 03/12/2023 04:01 PM    MCHC 34.3 03/12/2023 04:01 PM    RDW 38.8 03/12/2023 04:01 PM    PLATELETCT 281 03/12/2023 04:01 PM    MPV 9.8 03/12/2023 04:01 PM    NEUTSPOLYS 66.80 03/12/2023 04:01 PM    LYMPHOCYTES 24.80 03/12/2023 04:01 PM    MONOCYTES 6.10 03/12/2023 04:01 PM    EOSINOPHILS 1.40 03/12/2023 04:01 PM    BASOPHILS 0.70 03/12/2023 04:01 PM    IMMGRAN 0.20 03/12/2023 04:01 PM    NRBC 0.00 03/12/2023 04:01 PM    NEUTS 5.61 03/12/2023 04:01 PM     LYMPHS 2.08 03/12/2023 04:01 PM    MONOS 0.51 03/12/2023 04:01 PM    EOS 0.12 03/12/2023 04:01 PM    BASO 0.06 03/12/2023 04:01 PM    IMMGRANAB 0.02 03/12/2023 04:01 PM    NRBCAB 0.00 03/12/2023 04:01 PM        CBC w/o DIFF  Lab Results   Component Value Date/Time    WBC 8.4 03/12/2023 04:01 PM    RBC 4.81 03/12/2023 04:01 PM    HEMOGLOBIN 14.8 03/12/2023 04:01 PM    MCV 89.8 03/12/2023 04:01 PM    MCH 30.8 03/12/2023 04:01 PM    MCHC 34.3 03/12/2023 04:01 PM    RDW 38.8 03/12/2023 04:01 PM    MPV 9.8 03/12/2023 04:01 PM         EKG interpreted by me: NSR    Impression/Plan:  1. Palpitations  EKG        pAF  First degree heart block  High risk medication use  Hypercoagulable state due to afib    - Continue Eliquis  - Continue Flecainide 100mg PO BID. Do not recommend increasing given heart block.  - If further afib, recommend stopping flecainide and arranging admit for sotalol loading. Discussed redo ablation although pt hesitant to proceed with this.  - High risk medication usage discussed with patient, requiring frequent monitoring and ECG/lab work.    RTC 3-6 months    Todd Lozada MD  Cardiac Electrophysiology

## 2023-04-04 NOTE — LETTER
[facility]   [address], [facility] [address]  [place] [address]  Phone: [phone]  Fax: [phone]              Gloria Herndon  1940    Encounter Date: 4/4/2023    Todd Lozada M.D.          PROGRESS NOTE:  Arrhythmia Clinic Note (Established patient)    DOS: 4/4/2023    Chief complaint/Reason for consult: Afib    Interval History: 81 y/o F with pAF, prior ablation in 2017 (PVI/PWI), with recurrent pAF managed on flecainide. She had another episode of pAF reverted to NSR in the ED last month. 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 She is doing OK and recently spaced out her medications to allow for better oral tolerance and since then has not had any further afib.     ROS (+ highlighted in bold):  Constitutional: Fevers/chills/fatigue/weightloss  HEENT: Blurry vision/eye pain/sore throat/hearing loss  Respiratory: Shortness of breath/cough  Cardiovascular: Chest pain/palpitations/edema/orthopnea/syncope  GI: Nausea/vomitting/diarrhea  MSK: Arthralgias/myagias/muscle weakness  Skin: Rash/sores  Neurological: Numbness/tremors/vertigo  Endocrine: Excessive thirst/polyuria/cold intolerance/heat intolerance  Psych: Depression/anxiety    Past Medical History:   Diagnosis Date    Arrhythmia     Pt states she has Atrial Fib    Atrial fibrillation (HCC)     History of cardiac radiofrequency ablation 12/01/2017    TIA (transient ischemic attack) 10/2020       Past Surgical History:   Procedure Laterality Date    OTHER CARDIAC SURGERY      ablation       Social History     Socioeconomic History    Marital status:      Spouse name: Not on file    Number of children: Not on file    Years of education: Not on file    Highest education level: Not on file   Occupational History    Not on file   Tobacco Use    Smoking status: Never    Smokeless tobacco: Never   Vaping Use    Vaping Use: Never used   Substance and Sexual Activity    Alcohol  use: Not Currently     Comment: very rare glass of wine    Drug use: Not Currently    Sexual activity: Not on file   Other Topics Concern    Not on file   Social History Narrative    Not on file     Social Determinants of Health     Financial Resource Strain: Not on file   Food Insecurity: Not on file   Transportation Needs: Not on file   Physical Activity: Not on file   Stress: Not on file   Social Connections: Not on file   Intimate Partner Violence: Not on file   Housing Stability: Not on file       History reviewed. No pertinent family history.    Allergies   Allergen Reactions    Blue Dyes Vomiting    Eliquis [Apixaban] Unspecified     Pt does not have an allergie to this medication     Green Dyes Vomiting    Iodine Hives    Ethyl Alcohol, Skin Unspecified     Pt is not sure what happens     Iodine Solution [Povidone Iodine] Unspecified     Red spots, pain    Omeprazole Unspecified     Urinary incontinence     Tape Unspecified     Welts on skin, paper tape is ok       Current Outpatient Medications   Medication Sig Dispense Refill    clonazePAM (KLONOPIN) 0.5 MG Tab TAKE ONE TABLET BY MOUTH NIGHTLY AT BEDTIME AS NEEDED FOR INSOMNIA FOR 30 DAYS      apixaban (ELIQUIS) 2.5mg Tab Take 1 Tablet by mouth 2 times a day. 180 Tablet 3    flecainide (TAMBOCOR) 100 MG Tab Take 1 Tablet by mouth 2 times a day. (Patient taking differently: Take 100 mg by mouth 2 times a day. Pt took on tablet @ 0700 and then another one @ 1400 (3/12/2023)) 60 Tablet 2    Evolocumab, REPATHA, 140 MG/ML Solution Auto-injector Inject 1 Each under the skin every 14 days. 6 mL 3    metoprolol SR (TOPROL XL) 100 MG TABLET SR 24 HR Take 1 Tablet by mouth every day. 90 Tablet 3    estrogens, conjugated (PREMARIN) 0.3 MG Tab Take 0.3 mg by mouth every day.      flecainide (TAMBOCOR) 150 MG Tab Take 1 Tablet by mouth 2 times a day. (Patient not taking: Reported on 4/4/2023) 60 Tablet 0     No current facility-administered medications for this visit.  "      Physical Exam:  Vitals:    04/04/23 0851   BP: 124/70   BP Location: Left arm   Patient Position: Sitting   BP Cuff Size: Adult   Pulse: (!) 55   Resp: 16   SpO2: 96%   Weight: 62.6 kg (138 lb)   Height: 1.702 m (5' 7\")     General appearance: NAD, conversant   Eyes: anicteric sclerae, moist conjunctivae; no lid-lag; PERRLA  HENT: Atraumatic; oropharynx clear with moist mucous membranes and no mucosal ulcerations; normal hard and soft palate  Neck: Trachea midline; FROM, supple, no thyromegaly or lymphadenopathy  Lungs: CTA, with normal respiratory effort and no intercostal retractions  CV: RRR, no MRGs, no JVD  Abdomen: Soft, non-tender; no masses or HSM  Extremities: No peripheral edema or extremity lymphadenopathy  Skin: Normal temperature, turgor and texture; no rash, ulcers or subcutaneous nodules  Psych: Appropriate affect, alert and oriented to person, place and time    Data:  Lipids:   Lab Results   Component Value Date/Time    CHOLSTRLTOT 179 10/07/2020 05:50 AM    TRIGLYCERIDE 87 10/07/2020 05:50 AM    HDL 81 10/07/2020 05:50 AM    LDL 81 10/07/2020 05:50 AM        BMP:  Lab Results   Component Value Date/Time    SODIUM 140 03/12/2023 1601    POTASSIUM 4.2 03/12/2023 1601    CHLORIDE 104 03/12/2023 1601    CO2 26 03/12/2023 1601    GLUCOSE 117 (H) 03/12/2023 1601    BUN 23 (H) 03/12/2023 1601    CREATININE 1.01 03/12/2023 1601    CALCIUM 9.3 03/12/2023 1601    ANION 10.0 03/12/2023 1601        TSH:   No results found for: TSHULTRASEN     THYROXINE (T4):   No results found for: FREEDIR     CBC:   Lab Results   Component Value Date/Time    WBC 8.4 03/12/2023 04:01 PM    RBC 4.81 03/12/2023 04:01 PM    HEMOGLOBIN 14.8 03/12/2023 04:01 PM    HEMATOCRIT 43.2 03/12/2023 04:01 PM    MCV 89.8 03/12/2023 04:01 PM    MCH 30.8 03/12/2023 04:01 PM    MCHC 34.3 03/12/2023 04:01 PM    RDW 38.8 03/12/2023 04:01 PM    PLATELETCT 281 03/12/2023 04:01 PM    MPV 9.8 03/12/2023 04:01 PM    NEUTSPOLYS 66.80 03/12/2023 " 04:01 PM    LYMPHOCYTES 24.80 03/12/2023 04:01 PM    MONOCYTES 6.10 03/12/2023 04:01 PM    EOSINOPHILS 1.40 03/12/2023 04:01 PM    BASOPHILS 0.70 03/12/2023 04:01 PM    IMMGRAN 0.20 03/12/2023 04:01 PM    NRBC 0.00 03/12/2023 04:01 PM    NEUTS 5.61 03/12/2023 04:01 PM    LYMPHS 2.08 03/12/2023 04:01 PM    MONOS 0.51 03/12/2023 04:01 PM    EOS 0.12 03/12/2023 04:01 PM    BASO 0.06 03/12/2023 04:01 PM    IMMGRANAB 0.02 03/12/2023 04:01 PM    NRBCAB 0.00 03/12/2023 04:01 PM        CBC w/o DIFF  Lab Results   Component Value Date/Time    WBC 8.4 03/12/2023 04:01 PM    RBC 4.81 03/12/2023 04:01 PM    HEMOGLOBIN 14.8 03/12/2023 04:01 PM    MCV 89.8 03/12/2023 04:01 PM    MCH 30.8 03/12/2023 04:01 PM    MCHC 34.3 03/12/2023 04:01 PM    RDW 38.8 03/12/2023 04:01 PM    MPV 9.8 03/12/2023 04:01 PM         EKG interpreted by me: NSR    Impression/Plan:  1. Palpitations  EKG        pAF  First degree heart block  High risk medication use  Hypercoagulable state due to afib    - Continue Eliquis  - Continue Flecainide 100mg PO BID. Do not recommend increasing given heart block.  - If further afib, recommend stopping flecainide and arranging admit for sotalol loading. Discussed redo ablation although pt hesitant to proceed with this.  - High risk medication usage discussed with patient, requiring frequent monitoring and ECG/lab work.    RTC 3-6 months    Todd Lozada MD  Cardiac Electrophysiology        Jimy Lange M.D.  [id number]  [facility] [address]  Via Fax: [phone]

## 2023-05-01 ENCOUNTER — OFFICE VISIT (OUTPATIENT)
Dept: CARDIOLOGY | Facility: MEDICAL CENTER | Age: 83
End: 2023-05-01
Payer: MEDICARE

## 2023-05-01 VITALS
DIASTOLIC BLOOD PRESSURE: 62 MMHG | RESPIRATION RATE: 16 BRPM | BODY MASS INDEX: 21.66 KG/M2 | HEART RATE: 58 BPM | OXYGEN SATURATION: 92 % | SYSTOLIC BLOOD PRESSURE: 110 MMHG | HEIGHT: 67 IN | WEIGHT: 138 LBS

## 2023-05-01 DIAGNOSIS — Z86.79 HISTORY OF ATRIAL FIBRILLATION: ICD-10-CM

## 2023-05-01 DIAGNOSIS — R00.2 PALPITATIONS: ICD-10-CM

## 2023-05-01 DIAGNOSIS — I48.91 ATRIAL FIBRILLATION, UNSPECIFIED TYPE (HCC): ICD-10-CM

## 2023-05-01 PROCEDURE — 99214 OFFICE O/P EST MOD 30 MIN: CPT | Mod: 25 | Performed by: INTERNAL MEDICINE

## 2023-05-01 PROCEDURE — 93000 ELECTROCARDIOGRAM COMPLETE: CPT | Performed by: INTERNAL MEDICINE

## 2023-05-01 RX ORDER — FLECAINIDE ACETATE 100 MG/1
100 TABLET ORAL 2 TIMES DAILY
Qty: 60 TABLET | Refills: 6 | Status: SHIPPED | OUTPATIENT
Start: 2023-05-01 | End: 2023-10-30

## 2023-05-01 ASSESSMENT — FIBROSIS 4 INDEX: FIB4 SCORE: 1.57

## 2023-05-01 NOTE — PROGRESS NOTES
Arrhythmia Clinic Note (Established patient)    DOS: 5/1/2023    Chief complaint/Reason for consult: Afib    Interval History: 83 y/o F with pAF, prior ablation in 2017 (PVI/PWI), with recurrent pAF managed on flecainide. 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 Last visit she seemed to be doing better with spacing out her medications differently. Unfortunately now back to frequent afib episodes almost daily.     ROS (+ highlighted in bold):  Constitutional: Fevers/chills/fatigue/weightloss  HEENT: Blurry vision/eye pain/sore throat/hearing loss  Respiratory: Shortness of breath/cough  Cardiovascular: Chest pain/palpitations/edema/orthopnea/syncope  GI: Nausea/vomitting/diarrhea  MSK: Arthralgias/myagias/muscle weakness  Skin: Rash/sores  Neurological: Numbness/tremors/vertigo  Endocrine: Excessive thirst/polyuria/cold intolerance/heat intolerance  Psych: Depression/anxiety    Past Medical History:   Diagnosis Date    Arrhythmia     Pt states she has Atrial Fib    Atrial fibrillation (HCC)     History of cardiac radiofrequency ablation 12/01/2017    TIA (transient ischemic attack) 10/2020       Past Surgical History:   Procedure Laterality Date    OTHER CARDIAC SURGERY      ablation       Social History     Socioeconomic History    Marital status:      Spouse name: Not on file    Number of children: Not on file    Years of education: Not on file    Highest education level: Not on file   Occupational History    Not on file   Tobacco Use    Smoking status: Never    Smokeless tobacco: Never   Vaping Use    Vaping Use: Never used   Substance and Sexual Activity    Alcohol use: Not Currently     Comment: very rare glass of wine    Drug use: Not Currently    Sexual activity: Not on file   Other Topics Concern    Not on file   Social History Narrative    Not on file     Social Determinants of Health     Financial Resource Strain: Not on file   Food Insecurity: Not on file   Transportation  Needs: Not on file   Physical Activity: Not on file   Stress: Not on file   Social Connections: Not on file   Intimate Partner Violence: Not on file   Housing Stability: Not on file       No family history on file.    Allergies   Allergen Reactions    Blue Dyes Vomiting    Eliquis [Apixaban] Unspecified     Pt does not have an allergie to this medication     Green Dyes Vomiting    Iodine Hives    Ethyl Alcohol, Skin Unspecified     Pt is not sure what happens     Iodine Solution [Povidone Iodine] Unspecified     Red spots, pain    Omeprazole Unspecified     Urinary incontinence     Tape Unspecified     Welts on skin, paper tape is ok       Current Outpatient Medications   Medication Sig Dispense Refill    clonazePAM (KLONOPIN) 0.5 MG Tab TAKE ONE TABLET BY MOUTH NIGHTLY AT BEDTIME AS NEEDED FOR INSOMNIA FOR 30 DAYS      apixaban (ELIQUIS) 2.5mg Tab Take 1 Tablet by mouth 2 times a day. 180 Tablet 3    flecainide (TAMBOCOR) 150 MG Tab Take 1 Tablet by mouth 2 times a day. (Patient not taking: Reported on 4/4/2023) 60 Tablet 0    flecainide (TAMBOCOR) 100 MG Tab Take 1 Tablet by mouth 2 times a day. (Patient taking differently: Take 100 mg by mouth 2 times a day. Pt took on tablet @ 0700 and then another one @ 1400 (3/12/2023)) 60 Tablet 2    Evolocumab, REPATHA, 140 MG/ML Solution Auto-injector Inject 1 Each under the skin every 14 days. 6 mL 3    metoprolol SR (TOPROL XL) 100 MG TABLET SR 24 HR Take 1 Tablet by mouth every day. 90 Tablet 3    estrogens, conjugated (PREMARIN) 0.3 MG Tab Take 0.3 mg by mouth every day.       No current facility-administered medications for this visit.       Physical Exam:  There were no vitals filed for this visit.  General appearance: NAD, conversant   Eyes: anicteric sclerae, moist conjunctivae; no lid-lag; PERRLA  HENT: Atraumatic; oropharynx clear with moist mucous membranes and no mucosal ulcerations; normal hard and soft palate  Neck: Trachea midline; FROM, supple, no  thyromegaly or lymphadenopathy  Lungs: CTA, with normal respiratory effort and no intercostal retractions  CV: RRR, no MRGs, no JVD  Abdomen: Soft, non-tender; no masses or HSM  Extremities: No peripheral edema or extremity lymphadenopathy  Skin: Normal temperature, turgor and texture; no rash, ulcers or subcutaneous nodules  Psych: Appropriate affect, alert and oriented to person, place and time    Data:  Lipids:   Lab Results   Component Value Date/Time    CHOLSTRLTOT 179 10/07/2020 05:50 AM    TRIGLYCERIDE 87 10/07/2020 05:50 AM    HDL 81 10/07/2020 05:50 AM    LDL 81 10/07/2020 05:50 AM        BMP:  Lab Results   Component Value Date/Time    SODIUM 140 03/12/2023 1601    POTASSIUM 4.2 03/12/2023 1601    CHLORIDE 104 03/12/2023 1601    CO2 26 03/12/2023 1601    GLUCOSE 117 (H) 03/12/2023 1601    BUN 23 (H) 03/12/2023 1601    CREATININE 1.01 03/12/2023 1601    CALCIUM 9.3 03/12/2023 1601    ANION 10.0 03/12/2023 1601        TSH:   No results found for: TSHULTRASEN     THYROXINE (T4):   No results found for: FREEDIR     CBC:   Lab Results   Component Value Date/Time    WBC 8.4 03/12/2023 04:01 PM    RBC 4.81 03/12/2023 04:01 PM    HEMOGLOBIN 14.8 03/12/2023 04:01 PM    HEMATOCRIT 43.2 03/12/2023 04:01 PM    MCV 89.8 03/12/2023 04:01 PM    MCH 30.8 03/12/2023 04:01 PM    MCHC 34.3 03/12/2023 04:01 PM    RDW 38.8 03/12/2023 04:01 PM    PLATELETCT 281 03/12/2023 04:01 PM    MPV 9.8 03/12/2023 04:01 PM    NEUTSPOLYS 66.80 03/12/2023 04:01 PM    LYMPHOCYTES 24.80 03/12/2023 04:01 PM    MONOCYTES 6.10 03/12/2023 04:01 PM    EOSINOPHILS 1.40 03/12/2023 04:01 PM    BASOPHILS 0.70 03/12/2023 04:01 PM    IMMGRAN 0.20 03/12/2023 04:01 PM    NRBC 0.00 03/12/2023 04:01 PM    NEUTS 5.61 03/12/2023 04:01 PM    LYMPHS 2.08 03/12/2023 04:01 PM    MONOS 0.51 03/12/2023 04:01 PM    EOS 0.12 03/12/2023 04:01 PM    BASO 0.06 03/12/2023 04:01 PM    IMMGRANAB 0.02 03/12/2023 04:01 PM    NRBCAB 0.00 03/12/2023 04:01 PM        CBC w/o  DIFF  Lab Results   Component Value Date/Time    WBC 8.4 03/12/2023 04:01 PM    RBC 4.81 03/12/2023 04:01 PM    HEMOGLOBIN 14.8 03/12/2023 04:01 PM    MCV 89.8 03/12/2023 04:01 PM    MCH 30.8 03/12/2023 04:01 PM    MCHC 34.3 03/12/2023 04:01 PM    RDW 38.8 03/12/2023 04:01 PM    MPV 9.8 03/12/2023 04:01 PM       Prior echo/stress reviewed: Normal LVEF    EKG interpreted by me: NSR    Impression/Plan:  1. Atrial fibrillation, unspecified type (HCC)  EKG      2. History of atrial fibrillation          pAF  High risk medication use  First degree AV block  Hypercoagulable state due to afib    - Discussed options including switching to sotalol, vs redo ablation  - She is averse to medication changes  - Discussed redo AF ablation at length  - We discussed pulmonary vein isolation for therapeutic management and continued rhythm control.  We discussed the risks and benefits of this procedure.  Risks include 1-3% risk of major cardiovascular event including stroke, myocardial infarction, phrenic nerve damage, esophageal injury and/or fistula formation, cardiac perforation, pericardial effusion, tamponade, major bleeding, or death.  I quoted a 70 to 80% chance free of atrial fibrillation at 12 months.  We discussed that he may also need a second procedure.  - Plan ablation, continue flecainide for now    We will proceed with redo AF ablation, check prior PVI/PWI, add lines +/- VOM PRN    Todd Lozada MD  Cardiac Electrophysiology

## 2023-05-02 ENCOUNTER — TELEPHONE (OUTPATIENT)
Dept: CARDIOLOGY | Facility: MEDICAL CENTER | Age: 83
End: 2023-05-02
Payer: MEDICARE

## 2023-05-02 DIAGNOSIS — R00.2 PALPITATIONS: ICD-10-CM

## 2023-05-02 DIAGNOSIS — I48.91 ATRIAL FIBRILLATION, UNSPECIFIED TYPE (HCC): ICD-10-CM

## 2023-05-02 LAB
EKG IMPRESSION: NORMAL
EKG IMPRESSION: NORMAL

## 2023-05-02 NOTE — TELEPHONE ENCOUNTER
Caller: Gloria Herndon      Medication Name and Dosage: flecainide (TAMBOCOR) 100 MG Tab       Please call your pharmacy and have them send us a refill request or speak to a live representative, RX number may have changed.    Medication amount left: 5 days    Preferred Pharmacy: [facility] in [place]    Other questions (Topic): n/a    Callback Number (Will only call for issues): [phone]    Thank you    -Gerry SERRANO

## 2023-05-03 ENCOUNTER — TELEPHONE (OUTPATIENT)
Dept: CARDIOLOGY | Facility: MEDICAL CENTER | Age: 83
End: 2023-05-03
Payer: MEDICARE

## 2023-05-03 NOTE — TELEPHONE ENCOUNTER
----- Message from Todd Lozada M.D. sent at 5/1/2023  2:50 PM PDT -----  Please schedule Afib ablation, no meds to hold, thanks

## 2023-05-03 NOTE — TELEPHONE ENCOUNTER
Patient is scheduled on 6-12-23 for an Afib ablation w/TAMICA w/anesthesia with Dr. Lozada. No meds to stop and patient to check in at 6:00 for a 7:30 procedure. Updated H&P to be done on admit by EP NP. Pre admit to call patient. Carto notified on 5-3-23 by email.

## 2023-05-04 ENCOUNTER — HOSPITAL ENCOUNTER (OUTPATIENT)
Facility: MEDICAL CENTER | Age: 83
End: 2023-05-04
Attending: INTERNAL MEDICINE | Admitting: INTERNAL MEDICINE
Payer: MEDICARE

## 2023-05-04 ENCOUNTER — APPOINTMENT (OUTPATIENT)
Dept: ADMISSIONS | Facility: MEDICAL CENTER | Age: 83
End: 2023-05-04
Attending: INTERNAL MEDICINE
Payer: MEDICARE

## 2023-05-04 RX ORDER — FLECAINIDE ACETATE 100 MG/1
100 TABLET ORAL 2 TIMES DAILY
Qty: 20 TABLET | Refills: 0 | Status: SHIPPED | OUTPATIENT
Start: 2023-05-04 | End: 2023-05-14

## 2023-05-15 ENCOUNTER — PRE-ADMISSION TESTING (OUTPATIENT)
Dept: ADMISSIONS | Facility: MEDICAL CENTER | Age: 83
End: 2023-05-15
Attending: INTERNAL MEDICINE
Payer: MEDICARE

## 2023-05-15 DIAGNOSIS — Z01.812 PRE-PROCEDURAL LABORATORY EXAMINATION: ICD-10-CM

## 2023-05-15 DIAGNOSIS — Z01.810 PRE-PROCEDURAL CARDIOVASCULAR EXAMINATION: ICD-10-CM

## 2023-06-02 NOTE — TELEPHONE ENCOUNTER
Recvd call from patient - she would like to cancel this procedure at this time. Per the patient, she has not had any afib episodes for over a month. She will call back to reschedule if she has another episode. I did let the patient know that Dr. Lozada is scheduling into late August [facility] now for ablations. So, if she called to reschedule, it would be out a couple of months. Patient would still like to cancel.     Cancelled case with Gloria in cath lab scheduling. Emailed Shawn.

## 2023-06-12 ENCOUNTER — APPOINTMENT (OUTPATIENT)
Dept: CARDIOLOGY | Facility: MEDICAL CENTER | Age: 83
End: 2023-06-12
Attending: INTERNAL MEDICINE
Payer: MEDICARE

## 2023-06-21 DIAGNOSIS — Z86.79 HISTORY OF ATRIAL FIBRILLATION: ICD-10-CM

## 2023-06-21 RX ORDER — METOPROLOL SUCCINATE 100 MG/1
TABLET, EXTENDED RELEASE ORAL
Qty: 90 TABLET | Refills: 3 | Status: SHIPPED | OUTPATIENT
Start: 2023-06-21

## 2023-06-21 NOTE — TELEPHONE ENCOUNTER
Is the patient due for a refill? Yes    Was the patient seen the past year? Yes    Date of last office visit: 5/1/2023    Does the patient have an upcoming appointment?  Yes   If yes, When? 10/1/2023    Provider to refill:LEON    Does the patients insurance require a 100 day supply?  No

## 2023-09-06 ENCOUNTER — TELEPHONE (OUTPATIENT)
Dept: NEUROLOGY | Facility: MEDICAL CENTER | Age: 83
End: 2023-09-06
Payer: MEDICARE

## 2023-09-06 NOTE — TELEPHONE ENCOUNTER
NEUROLOGY PATIENT PRE-VISIT PLANNING     Patient was NOT contacted to complete PVP.  Note: Patient will not be contacted if there is no indication to call.     Patient Appointment is scheduled as: New Patient     Is visit type and length scheduled correctly? Yes    [employer] Patient is checked in Patient Demographics? Yes    3.   Is referral attached to visit? Yes    4. Were records received from referring provider? Yes    4. Patient was  contacted to have someone accompany them to visit.     5. Is this appointment scheduled as a Hospital Follow-Up?  No    6. Does the patient require any pre procedure or post procedure follow up? No    7. If any orders were placed at last visit or intended to be done for this visit do we have Results/Consult Notes? No  Labs - Labs were not ordered at last office visit.  Imaging - Imaging was not ordered at last office visit.  Referrals - No referrals were ordered at last office visit.  Note: If patient appointment is for lab or imaging review and patient did not complete the studies, check with provider if OK to reschedule patient until completed.    8. If patient appointment is for Botox - is order pended for provider? N/A

## 2023-09-12 ENCOUNTER — OFFICE VISIT (OUTPATIENT)
Dept: NEUROLOGY | Facility: MEDICAL CENTER | Age: 83
End: 2023-09-12
Attending: PSYCHIATRY & NEUROLOGY
Payer: MEDICARE

## 2023-09-12 ENCOUNTER — HOSPITAL ENCOUNTER (OUTPATIENT)
Dept: LAB | Facility: MEDICAL CENTER | Age: 83
End: 2023-09-12
Attending: PSYCHIATRY & NEUROLOGY
Payer: MEDICARE

## 2023-09-12 VITALS
HEART RATE: 53 BPM | TEMPERATURE: 97.6 F | SYSTOLIC BLOOD PRESSURE: 110 MMHG | DIASTOLIC BLOOD PRESSURE: 64 MMHG | BODY MASS INDEX: 22.18 KG/M2 | HEIGHT: 67 IN | WEIGHT: 141.31 LBS | OXYGEN SATURATION: 99 %

## 2023-09-12 DIAGNOSIS — F02.A0 MILD LATE ONSET ALZHEIMER'S DEMENTIA WITHOUT BEHAVIORAL DISTURBANCE, PSYCHOTIC DISTURBANCE, MOOD DISTURBANCE, OR ANXIETY (HCC): ICD-10-CM

## 2023-09-12 DIAGNOSIS — G30.1 MILD LATE ONSET ALZHEIMER'S DEMENTIA WITHOUT BEHAVIORAL DISTURBANCE, PSYCHOTIC DISTURBANCE, MOOD DISTURBANCE, OR ANXIETY (HCC): Primary | ICD-10-CM

## 2023-09-12 DIAGNOSIS — F02.A0 MILD LATE ONSET ALZHEIMER'S DEMENTIA WITHOUT BEHAVIORAL DISTURBANCE, PSYCHOTIC DISTURBANCE, MOOD DISTURBANCE, OR ANXIETY (HCC): Primary | ICD-10-CM

## 2023-09-12 DIAGNOSIS — G30.1 MILD LATE ONSET ALZHEIMER'S DEMENTIA WITHOUT BEHAVIORAL DISTURBANCE, PSYCHOTIC DISTURBANCE, MOOD DISTURBANCE, OR ANXIETY (HCC): ICD-10-CM

## 2023-09-12 LAB
FOLATE SERPL-MCNC: 15.1 NG/ML
VIT B12 SERPL-MCNC: 555 PG/ML (ref 211–911)

## 2023-09-12 PROCEDURE — 82607 VITAMIN B-12: CPT

## 2023-09-12 PROCEDURE — 99212 OFFICE O/P EST SF 10 MIN: CPT | Performed by: PSYCHIATRY & NEUROLOGY

## 2023-09-12 PROCEDURE — 83921 ORGANIC ACID SINGLE QUANT: CPT

## 2023-09-12 PROCEDURE — 36415 COLL VENOUS BLD VENIPUNCTURE: CPT

## 2023-09-12 PROCEDURE — G2212 PROLONG OUTPT/OFFICE VIS: HCPCS | Performed by: PSYCHIATRY & NEUROLOGY

## 2023-09-12 PROCEDURE — 84425 ASSAY OF VITAMIN B-1: CPT

## 2023-09-12 PROCEDURE — 99215 OFFICE O/P EST HI 40 MIN: CPT | Performed by: PSYCHIATRY & NEUROLOGY

## 2023-09-12 PROCEDURE — 3074F SYST BP LT 130 MM HG: CPT | Performed by: PSYCHIATRY & NEUROLOGY

## 2023-09-12 PROCEDURE — 3078F DIAST BP <80 MM HG: CPT | Performed by: PSYCHIATRY & NEUROLOGY

## 2023-09-12 PROCEDURE — 82746 ASSAY OF FOLIC ACID SERUM: CPT

## 2023-09-12 ASSESSMENT — MONTREAL COGNITIVE ASSESSMENT (MOCA)
ORIENTATION SUBSCORE: 2/6
2. COPY DRAWING: 0/1
3. DRAW A CLOCK: CONTOUR, NUMBERS, HANDS: 2/3
CATEGORY CUE (IF APPLICABLE): 3
CATEGORY CUE (IF APPLICABLE): 1
9. REPEAT EACH SENTENCE: 0/2
WHAT IS THE VERSION OF MOCA ADMINISTERED: 7.3
4. NAME EACH OF THE THREE ANIMALS SHOWN: 2/3
10. [FLUENCY] NAME WORDS STARTING WITH DESIGNATED LETTER: 0/1
WHAT IS THE TOTAL SCORE (OUT OF 30): 11
11. FOR EACH PAIR OF WORDS, WHAT CATEGORY DO THEY BELONG TO (OUT OF 2): 1/2
DELAYED RECALL SUBSCORE: 0/5
6. READ LIST OF DIGITS [FORWARD/BACKWARD]: 2/2
8. SERIAL SUBTRACTION OF 7S: 1/5
7. [VIGILENCE] TAP WHEN HEARING DESIGNATED LETTER: 1/1

## 2023-09-12 ASSESSMENT — PATIENT HEALTH QUESTIONNAIRE - PHQ9: CLINICAL INTERPRETATION OF PHQ2 SCORE: 0

## 2023-09-12 ASSESSMENT — FIBROSIS 4 INDEX: FIB4 SCORE: 1.57

## 2023-09-12 NOTE — PROGRESS NOTES
"Reason for Neurology Consult:  Concern for Dementia    History of present illness:    Gloria Herndon 82 y.o. right handed woman who was evaluated back in late 2021 after having a TIA (single) event. She was seen in the Neurology outpatient clinic by Lois LANDRY (her note was reviewed by me).    She was the [employer] [facility].    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 Additional relevant medical history is notable for a TIA in October 2020.     Dr. Herndon was seen at the [facility] by Dr. Juan Kiran MD, in July 2020. During this visit, she reported a four-year history of word-finding difficulties during speech and in her writing. 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 During this period, Dr. Herndon was diagnosed with ADD and prescribed Ritalin with some success in improving concentration. 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 She also reported some memory difficulties, including forgetting whether or not she completed specific tasks (e.g. paid the bills, ate a meal), how to navigate certain " "computer tasks, and the date and plans for the day. 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 Dr. Herndon has developed compensatory strategies to ensure that she does not forget to pay the bills or take her medications (uses a pillbox) and continues to manage all ADLs and IADLs independently. She continues to drive, but asks family members to assist at times.     Her neurological exam, conducted via video by Dr. Kiran, was notable for some word finding trouble, but otherwise unremarkable. She scored 28/30 on a brief cognitive screener (MMSE), demonstrating mild weakness in working memory. Her clinical history was considered consistent with a diagnosis of mild cognitive impairment (MCI). \"    Neuroimaging     FDG PET-CT scan (March 2016) was read as normal. Dr. Kiran reviewed this scan and identified possible evidence for “slight biparietal hypometabolism, but not enough to be clearly abnormal” and no significant atrophy on the accompanying CT scan.    \"The etiology is unclear and likely multi-factorial. Dr. Lawsonliz demonstrated solid retention of verbal information across an hour delay, recalling 100% of the words that she initially learned. This pattern is atypical in individuals with early stages of Alzheimer’s disease. However, Dr. 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 Given Dr. Herndon’s history of TIA, and family history of cardiovascular conditions, a vascular contribution is possible.\"    Had single Variant (E4) of APOE Gene tested in 2016.      Historically  related that back in 2016 there was an issue that may develop Alzheimer's Disease given her mother had late onset " "Alzheimer's Disease and  at age 97.    About 4-5 years ago there were subtle changes in her ability to come up with words and she was doing a book tour and she had concern about her speech and whether this would effect her book tour.     Over the last few years there has been subtle difficulty with word retrievals (not clear progression per  in this issue in the last 12 months).    Continued to read the Newspaper and TV and does not clearly identify any problems reading in the last 3-6 months and \"reads all the time\" and frequently reads several books every few weeks.    No sundowning noticed in the recent months.    There is some difficulty with using a computer in the last 2 years such as  how to do an email or a text message and she could do this without difficulties about 3-4 years ago.    There has been no scott behavioral changes, mood changes or psychotic features in the recent months.    There are no parkinsonian features evolving in the last 6-12 months or so.    There are no dysarthric symptoms or dysphagic symptoms in the last 12 months or so.    Patient Active Problem List    Diagnosis Date Noted    TIA (transient ischemic attack) 10/06/2020    Mild cognitive impairment 10/06/2020    Dyslipidemia 10/06/2020    History of atrial fibrillation 10/06/2020    Family history of heart disease 2014       Past medical history:   Past Medical History:   Diagnosis Date    Arrhythmia     Pt states she has Atrial Fib    Asthma     Atrial fibrillation (HCC)     High cholesterol     History of cardiac radiofrequency ablation 2017    TIA (transient ischemic attack) 10/2020       Past surgical history:   Past Surgical History:   Procedure Laterality Date    CHOLECYSTECTOMY  2003    OTHER Bilateral     Lens 2013    OTHER CARDIAC SURGERY      ablation         Social history:   Social History     Socioeconomic History    Marital status:      Spouse name: Not on file    Number of children: " "Not on file    Years of education: Not on file    Highest education level: Not on file   Occupational History    Not on file   Tobacco Use    Smoking status: Never    Smokeless tobacco: Never   Vaping Use    Vaping Use: Never used   Substance and Sexual Activity    Alcohol use: Not Currently     Comment: very rare glass of wine    Drug use: Not Currently    Sexual activity: Not on file   Other Topics Concern    Not on file   Social History Narrative    Not on file     Social Determinants of Health     Financial Resource Strain: Not on file   Food Insecurity: Not on file   Transportation Needs: Not on file   Physical Activity: Not on file   Stress: Not on file   Social Connections: Not on file   Intimate Partner Violence: Not on file   Housing Stability: Not on file       Family history:   No family history on file.      Current medications:   Current Outpatient Medications   Medication    metoprolol SR (TOPROL XL) 100 MG TABLET SR 24 HR    flecainide (TAMBOCOR) 100 MG Tab    clonazePAM (KLONOPIN) 0.5 MG Tab    apixaban (ELIQUIS) 2.5mg Tab    Evolocumab, REPATHA, 140 MG/ML Solution Auto-injector    estrogens, conjugated (PREMARIN) 0.3 MG Tab     No current facility-administered medications for this visit.       Medication Allergy:  Allergies   Allergen Reactions    Blue Dyes Vomiting    Green Dyes Vomiting    Iodine Hives    Iodine Solution [Povidone Iodine] Unspecified     Red spots, pain    Omeprazole Unspecified     Urinary incontinence     Tape Unspecified     Welts on skin, paper tape is ok           Physical examination:   Vitals:    09/12/23 1545   BP: 110/64   BP Location: Left arm   Patient Position: Sitting   BP Cuff Size: Adult   Pulse: (!) 53   Temp: 36.4 °C (97.6 °F)   TempSrc: Temporal   SpO2: 99%   Weight: 64.1 kg (141 lb 5 oz)   Height: 1.702 m (5' 7.01\")       Normal cephalic atraumatic.  There is full range of movement around the neck in all directions without restrictions or discrete pain evoked " "triggers.  No lower extremity edema.      Neurological  Exam:      [place] Cognitive Assessment (MOCA) Version 7.1    Years of Education: MD/PhD- Epidemiology    TOTAL SCORE:   (to be scanned into the MEDIA section in the E.M.R.)      When I asked her when she was born, she did NOT know.  Her stated \" I do know\"    She thought it was \"Wednesday\" because \"I have these pills to take\"    She thought it was  but I then asked her if this is her birthday and she said no \"it is tomorrow\".    She could not tell me the name of the City she is presently in.    I asked her what is similar between a Banana and a Orange> \"they are food\". I asked her if they are vegetables and she agreed they are not but she could not state Fruit.    [place] Cognitive Assessment (MOCA) Version Number: 7.3   VISUOSPACIAL / EXECUTIVE   Clock Drawin/3  Spatial Drawing:  (could not do trails task)  Cube Drawin/1 (cylinder small and narrow)    NAMING  Namin/3    MEMORY  Memory:      ATTENTION  Digits: 2/2  Letters: 1/1  Subtraction: 1/5    LANGUAGE  Repeat Phrases: 0/2  Fluency: 0/1 (stated 9 words that begin in B)    ABSTRACTION  Abstraction: 1/2    DELAYED RECALL  Recall words: 0/5  Category Cue (if applicable): 1 (got chair with multiple choice cue)  Multiple Choice Cue (if applicable):3 (could not get train,egg or blue even with choices (discrete))    ORIENTATION  Orientation: 2/6    Add 1 point if less than or equal to 12 yr education level:     MOCA TOTAL SCORE:    Biomechanical/Visual Limitations (if applicable):          Mental status: Awake, alert and fully oriented to person, place, and situation. Normal attention and concentration.  Did not appear/act combative,irritable,anxious,paranoid/delusional or aggressive to or with me.    Speech and language: Speech is fluent without errors and clear.     Follows 3 step motor commands in sequence without significant delay and correctly.    Cranial nerve " exam:  II: Pupils are equally round and reactive to light. Visual fields are intact by confrontation.  III, IV, VI: EOMI, no diplopia, no ptosis.  V: Sensation to light touch is normal over V1-3 distributions bilaterally.  .  VII: Facial movements are symmetrical. There is no facial droop. .  VIII: Hearing intact to soft speech and finger rub bilaterally  IX: Palate elevates symmetrically, uvula is midline. Dysarthria is not present.  XI: Shoulder shrug are symmetrical and strong.   XII: Tongue protrudes midline.      Motor exam:  Muscle tone is normal in all 4 limbs. and No abnormal movements appreciated.    Muscle strength:    Neck Flexors/Extensors: 5/5       Right  Left  Deltoid   5/5  5/5      Biceps   5/5  5/5  Triceps              5/5  5/5   Wrist extensors 5/5  5/5  Wrist flexors  5/5  5/5     5/5  5/5  Interossei  5/5  5/5  Thenar (APB)  5/5  5/5   Hip flexors  5/5  5/5  Quadriceps  5/5  5/5    Hamstrings  5/5  5/5  Dorsiflexors  5/5  5/5  Plantarflexors  5/5  5/5  Toe extension  5/5  5/5          Reflexes:       Right  Left  Biceps   2/4  2/4  Triceps              2/4  2/4  Brachioradialis   2/4  2/4  Knee jerk  2/4  2/4  Ankle jerk  2/4  2/4     Frontal release signs are absent    bilaterally toes are downgoing to plantar stimulation..    Coordination (finger-to-nose, heel/knee/shin, rapid alternating movements) was normal.     There was no ataxia, no tremors, and no dysmetria.     Station and gait >> easily stands up from exam chair without retropulsion,veering,leaning,swaying (to either side).       Labs and Tests:    SODIUM 132 - 144 mmol/L 137    POTASSIUM 3.5 - 5.3 mmol/L 4.2    CHLORIDE 100 - 112 mmol/L 103    CO2 23 - 29 mmol/L 26    CALCIUM 8.4 - 10.3 mg/dL 9.2    BUN 8 - 25 mg/dL 19    CREATININE 0.40 - 1.10 mg/dL 0.98    Comment: The GFR result is not clinically significant on patients <18 or >70 years of age.   GLUCOSE 74 - 99 mg/dL 106 High     TOTAL PROTEIN 5.2 - 8.9 g/dL 7.1    ALBUMIN  3.2 - 5.4 g/dL 4.0    BILIRUBIN TOTAL 0.0 - 1.3 mg/dL 0.5    ALKALINE PHOSPHATASE 39 - 117 U/L 70    AST 0 - 33 U/L 24    ALT 0 - 34 U/L 15    GFR >=60 mL/min/1.73 sq meter 57 Low         Ref Range & Units 1 mo ago   VITAMIN D TOTAL (25OH) 30 - 100 ng/mL 53         NEUROIMAGING:     10/6/2020 8:54 PM     HISTORY/REASON FOR EXAM:  Ataxia, stroke suspected; Okay for patient to be off telemetry monitoring for MRI.  Left-sided weakness, resolved.     TECHNIQUE/EXAM DESCRIPTION:  MRI of the brain without contrast.     T1 sagittal, T2 fast spin-echo axial, T1 coronal, FLAIR coronal, Diffusion weighted and Apparent Diffusion Coefficient (ADC map) axial images were obtained of the whole brain. Additional FLAIR axial images were obtained.     The study was performed on a J[employer] Signa 1.5 Therese MRI scanner.     COMPARISON:  Outside films head CT 10/6/2020     FINDINGS:  The calvariae are unremarkable.  There are no extra-axial fluid collections.     There is a pattern of moderate cerebral atrophy manifest as enlargement of sulcal markings over the convexities and vertex along with ventriculomegaly.     There is a pattern of minimal supratentorial white matter disease with a few rare foci of T2 and FLAIR hyperintensity in the subcortical and/or deep white matter consistent with small vessel ischemic change versus demyelination or gliosis.     There are no mass effects or shift of midline structures.  There are no hemorrhagic lesions.  The diffusion weighted axial images show no evidence of acute cerebral infarction.     The brainstem and posterior fossa structures are unremarkable.     Vascular flow voids in the vertebrobasilar and carotid arteries, [place] of [place], and dural venous sinuses are intact.     The paranasal sinuses in the field of view are unremarkable.     The mastoids are clear. There has been cataract surgery.     IMPRESSION:     1.  Moderate cerebral atrophy. Age-appropriate.  2.  Minimal supratentorial white  "matter disease most consistent with microvascular ischemic change.  3.  No evidence of acute infarction, hemorrhage, or mass lesion.      Impression/Plans/Recommendations:      Mild Dementia (probable Alzheimer's Disease)    Very gradual and slowly progressive changes in short term memory,word fluency,word retrieval     Brain MRI reviewed from 10/2020 and showed no ischemic changes of significance but did show atleast mild cerebral atrophy.    Global Deterioration Score in the 5-6 range per  today.    FAQ score today in the 11 range with difficulties with finances.    Today, I reviewed the clinical criteria and reviewed several  scenarios of the differences being using the medical terms of normal brain aging (age associated memory impairment),  Mild Cognitive Impairment (MCI) and Dementia.    Dementia  is a syndrome but statistically and for the majority of patients  occurs due  to a more rapid aging of the brain tissue or potentially from injury to certain parts of one's brain ( often from such contributing factors as  the cumulative effects of alcohol, from one or more ischemic or hemmorhagic stroke(s), from neurodegeneration or long standing with/without suboptimally controlled Hypertension). It is for some of these potential factors as to why I recommend a brain imaging test (Head CT or Brain MRI) be done for the 1st time or in certain circumstances repeated for comparison purposes  as such imaging can suggest one or more factors as to the reason(s) for the person's cognitive/memory changes. In fact, a normal or \"age related\" finding on a brain imaging test in and of itself is useful clinical and objective information to have in the evaluation of a person who has either an acute, evolving or even chronic (months to years) long cognitive/memory complaint.    Additional factors or contributors to Brain Health issues can be summarized in several books/references which I discussed as well today.     Goals " going forwards include:    A. Paying attention to one's risk factors and reducing their prevalence or potential impact on one's changing memory/thinking> an excellent example would be to stop smoking, reduce or eliminate alcohol use (depending on the amount and frequency of usage), maintain good blood pressure control by buying a digital arm blood pressure cuff such as an OMRON Series 3 or 5 and checking one's blood pressure atleast 3 days per week (in the am and early afternoon) that the numbers are under 140/90 and working as needed with the primary care doctor  to optimize blood pressure control).    B. Encouraging proper sleep hygiene which for most adults is 7 to 8 hours of uninterrupted sleep per night.      C. Encouraging some form of exercise preferable aerobic forms to be done (4 to 5 days per week- 30 minutes minimum per day)> 150 minutes per week as a goal. Example activities could include fast walking (up a slight incline), jogging, cycling (road or stationary), swimming,tennis. Essentially, even basic walking on a flat surface or a treadmill would be better than doing nothing.    D. Maintaining or forming new social contacts with family and friends  and a positive attitude despite the concerns and/or ongoing issues with thinking and/or memory.    E. Eating well which means a diet low in salt  (under 2 grams per day), sugar and saturated fat.    F. Maintaining one's BMI (Body Mass Index) under 30.    G. Consideration of the use of cognitive enhancers (acetylcholinerase inhibitors such as Aricept vs an NMDA Receptor agent like Namenda). Pros and cons of such compounds in terms of predicted efficacy and side effects profiles reviewed.  I gave  some     H. Will order some vitamin levels and will hold off on doing a Brain PET Scan or Brain MRI at this time after discussing these issues with dayday and her  today.    I. Brain  Healthy Diet (MIND Diet reviewed) today.    J.  OT guided Driving  evaluation to be done and order placed.    K. Dementia Self Management Book given th Gloria and .    L. Will not pursue anti amyloid IV medications after discussion with Gloria and her  of risks vs benefits (including she is on anticoagulation to reduce stroke risk given Afib).  Handout given to  on the IV anti amyloid compounds.    I have performed  a history and physical exam and a directed /focused  ROS today.    Total time spent today or this patient's care was 94 minutes  and included reviewing  the diagnostic workup to date (such as labs and imaging as well as interpreting such tests relevant to this patient's neurological condition),  reviewing/obtaining separately obtained history (from patient and/or accompanying  )  for today's neurological problem(s) ,counseling and educating the patient and family member on issues related to cognition/memory and cognitive health factors and documenting  the clinical information in the EMR.    Follow up in about 6-9 months or so.        Tomas Nelson MD  [facility] [facility].   [facility]

## 2023-09-15 LAB — METHYLMALONATE SERPL-SCNC: 0.16 UMOL/L (ref 0–0.4)

## 2023-09-16 LAB — VIT B1 BLD-MCNC: 114 NMOL/L (ref 70–180)

## 2023-10-04 ENCOUNTER — HOSPITAL ENCOUNTER (OUTPATIENT)
Dept: LAB | Facility: MEDICAL CENTER | Age: 83
End: 2023-10-04
Attending: INTERNAL MEDICINE
Payer: MEDICARE

## 2023-10-04 ENCOUNTER — OFFICE VISIT (OUTPATIENT)
Dept: CARDIOLOGY | Facility: MEDICAL CENTER | Age: 83
End: 2023-10-04
Attending: INTERNAL MEDICINE
Payer: MEDICARE

## 2023-10-04 VITALS
HEIGHT: 67 IN | RESPIRATION RATE: 14 BRPM | WEIGHT: 141 LBS | SYSTOLIC BLOOD PRESSURE: 110 MMHG | BODY MASS INDEX: 22.13 KG/M2 | DIASTOLIC BLOOD PRESSURE: 80 MMHG | HEART RATE: 70 BPM

## 2023-10-04 DIAGNOSIS — I48.91 ATRIAL FIBRILLATION, UNSPECIFIED TYPE (HCC): ICD-10-CM

## 2023-10-04 LAB
ALBUMIN SERPL BCP-MCNC: 4.3 G/DL (ref 3.2–4.9)
ALBUMIN/GLOB SERPL: 1.7 G/DL
ALP SERPL-CCNC: 88 U/L (ref 30–99)
ALT SERPL-CCNC: 23 U/L (ref 2–50)
ANION GAP SERPL CALC-SCNC: 9 MMOL/L (ref 7–16)
AST SERPL-CCNC: 26 U/L (ref 12–45)
BILIRUB SERPL-MCNC: 0.3 MG/DL (ref 0.1–1.5)
BUN SERPL-MCNC: 20 MG/DL (ref 8–22)
CALCIUM ALBUM COR SERPL-MCNC: 9 MG/DL (ref 8.5–10.5)
CALCIUM SERPL-MCNC: 9.2 MG/DL (ref 8.5–10.5)
CHLORIDE SERPL-SCNC: 104 MMOL/L (ref 96–112)
CO2 SERPL-SCNC: 27 MMOL/L (ref 20–33)
CREAT SERPL-MCNC: 0.81 MG/DL (ref 0.5–1.4)
ERYTHROCYTE [DISTWIDTH] IN BLOOD BY AUTOMATED COUNT: 41.3 FL (ref 35.9–50)
GFR SERPLBLD CREATININE-BSD FMLA CKD-EPI: 72 ML/MIN/1.73 M 2
GLOBULIN SER CALC-MCNC: 2.5 G/DL (ref 1.9–3.5)
GLUCOSE SERPL-MCNC: 112 MG/DL (ref 65–99)
HCT VFR BLD AUTO: 39.8 % (ref 37–47)
HGB BLD-MCNC: 13.4 G/DL (ref 12–16)
MCH RBC QN AUTO: 31.2 PG (ref 27–33)
MCHC RBC AUTO-ENTMCNC: 33.7 G/DL (ref 32.2–35.5)
MCV RBC AUTO: 92.6 FL (ref 81.4–97.8)
PLATELET # BLD AUTO: 269 K/UL (ref 164–446)
PMV BLD AUTO: 11.1 FL (ref 9–12.9)
POTASSIUM SERPL-SCNC: 4.6 MMOL/L (ref 3.6–5.5)
PROT SERPL-MCNC: 6.8 G/DL (ref 6–8.2)
RBC # BLD AUTO: 4.3 M/UL (ref 4.2–5.4)
SODIUM SERPL-SCNC: 140 MMOL/L (ref 135–145)
WBC # BLD AUTO: 5 K/UL (ref 4.8–10.8)

## 2023-10-04 PROCEDURE — 99212 OFFICE O/P EST SF 10 MIN: CPT | Performed by: INTERNAL MEDICINE

## 2023-10-04 PROCEDURE — 3079F DIAST BP 80-89 MM HG: CPT | Performed by: INTERNAL MEDICINE

## 2023-10-04 PROCEDURE — 3074F SYST BP LT 130 MM HG: CPT | Performed by: INTERNAL MEDICINE

## 2023-10-04 PROCEDURE — 99214 OFFICE O/P EST MOD 30 MIN: CPT | Performed by: INTERNAL MEDICINE

## 2023-10-04 PROCEDURE — 85027 COMPLETE CBC AUTOMATED: CPT

## 2023-10-04 PROCEDURE — 36415 COLL VENOUS BLD VENIPUNCTURE: CPT

## 2023-10-04 PROCEDURE — 93005 ELECTROCARDIOGRAM TRACING: CPT | Performed by: INTERNAL MEDICINE

## 2023-10-04 PROCEDURE — 80053 COMPREHEN METABOLIC PANEL: CPT

## 2023-10-04 ASSESSMENT — FIBROSIS 4 INDEX: FIB4 SCORE: 1.59

## 2023-10-04 NOTE — PROGRESS NOTES
Arrhythmia Clinic Note (Established patient)    DOS: 10/4/2023    Chief complaint/Reason for consult: Afib    Interval History: 82 y/o F with pAF s/p ablation in 2017 had recurrent afib despite flecainide in the Spring and wanted ablation but cancelled this summer after afib lessened. She has been doing better recently with less afib.    ROS (+ highlighted in bold):  Constitutional: Fevers/chills/fatigue/weightloss  HEENT: Blurry vision/eye pain/sore throat/hearing loss  Respiratory: Shortness of breath/cough  Cardiovascular: Chest pain/palpitations/edema/orthopnea/syncope  GI: Nausea/vomitting/diarrhea  MSK: Arthralgias/myagias/muscle weakness  Skin: Rash/sores  Neurological: Numbness/tremors/vertigo  Endocrine: Excessive thirst/polyuria/cold intolerance/heat intolerance  Psych: Depression/anxiety    Past Medical History:   Diagnosis Date    Arrhythmia     Pt states she has Atrial Fib    Asthma     Atrial fibrillation (HCC)     High cholesterol     History of cardiac radiofrequency ablation 12/01/2017    TIA (transient ischemic attack) 10/2020       Past Surgical History:   Procedure Laterality Date    CHOLECYSTECTOMY  08/08/2003    OTHER Bilateral     Lens 2013    OTHER CARDIAC SURGERY      ablation       Social History     Socioeconomic History    Marital status:      Spouse name: Not on file    Number of children: Not on file    Years of education: Not on file    Highest education level: Not on file   Occupational History    Not on file   Tobacco Use    Smoking status: Never    Smokeless tobacco: Never   Vaping Use    Vaping Use: Never used   Substance and Sexual Activity    Alcohol use: Not Currently     Comment: very rare glass of wine    Drug use: Not Currently    Sexual activity: Not on file   Other Topics Concern    Not on file   Social History Narrative    Not on file     Social Determinants of Health     Financial Resource Strain: Not on file   Food Insecurity: Not on file   Transportation Needs:  "Not on file   Physical Activity: Not on file   Stress: Not on file   Social Connections: Not on file   Intimate Partner Violence: Not on file   Housing Stability: Not on file       History reviewed. No pertinent family history.    Allergies   Allergen Reactions    Blue Dyes Vomiting    Green Dyes Vomiting    Iodine Hives    Iodine Solution [Povidone Iodine] Unspecified     Red spots, pain    Omeprazole Unspecified     Urinary incontinence     Tape Unspecified     Welts on skin, paper tape is ok       Current Outpatient Medications   Medication Sig Dispense Refill    metoprolol SR (TOPROL XL) 100 MG TABLET SR 24 HR TAKE 1 TABLET DAILY 90 Tablet 3    flecainide (TAMBOCOR) 100 MG Tab Take 1 Tablet by mouth 2 times a day. 60 Tablet 6    clonazePAM (KLONOPIN) 0.5 MG Tab TAKE ONE TABLET BY MOUTH NIGHTLY AT BEDTIME AS NEEDED FOR INSOMNIA FOR 30 DAYS      apixaban (ELIQUIS) 2.5mg Tab Take 1 Tablet by mouth 2 times a day. 180 Tablet 3    Evolocumab, REPATHA, 140 MG/ML Solution Auto-injector Inject 1 Each under the skin every 14 days. 6 mL 3    estrogens, conjugated (PREMARIN) 0.3 MG Tab Take 0.3 mg by mouth every day.       No current facility-administered medications for this visit.       Physical Exam:  Vitals:    10/04/23 1604   BP: 110/80   BP Location: Left arm   Patient Position: Sitting   BP Cuff Size: Adult   Pulse: 70   Resp: 14   Weight: 64 kg (141 lb)   Height: 1.702 m (5' 7\")   PF: 97 L/min     General appearance: NAD, conversant   Eyes: anicteric sclerae, moist conjunctivae; no lid-lag; PERRLA  HENT: Atraumatic; oropharynx clear with moist mucous membranes and no mucosal ulcerations; normal hard and soft palate  Neck: Trachea midline; FROM, supple, no thyromegaly or lymphadenopathy  Lungs: CTA, with normal respiratory effort and no intercostal retractions  CV: RRR, no MRGs, no JVD  Abdomen: Soft, non-tender; no masses or HSM  Extremities: No peripheral edema or extremity lymphadenopathy  Skin: Normal " "temperature, turgor and texture; no rash, ulcers or subcutaneous nodules  Psych: Appropriate affect, alert and oriented to person, place and time    Data:  Lipids:   Lab Results   Component Value Date/Time    CHOLSTRLTOT 179 10/07/2020 05:50 AM    TRIGLYCERIDE 87 10/07/2020 05:50 AM    HDL 81 10/07/2020 05:50 AM    LDL 81 10/07/2020 05:50 AM        BMP:  Lab Results   Component Value Date/Time    SODIUM 140 03/12/2023 1601    POTASSIUM 4.2 03/12/2023 1601    CHLORIDE 104 03/12/2023 1601    CO2 26 03/12/2023 1601    GLUCOSE 117 (H) 03/12/2023 1601    BUN 23 (H) 03/12/2023 1601    CREATININE 1.01 03/12/2023 1601    CALCIUM 9.3 03/12/2023 1601    ANION 10.0 03/12/2023 1601        TSH:   No results found for: \"TSHULTRASEN\"     THYROXINE (T4):   No results found for: \"FREEDIR\"     CBC:   Lab Results   Component Value Date/Time    WBC 8.4 03/12/2023 04:01 PM    RBC 4.81 03/12/2023 04:01 PM    HEMOGLOBIN 14.8 03/12/2023 04:01 PM    HEMATOCRIT 43.2 03/12/2023 04:01 PM    MCV 89.8 03/12/2023 04:01 PM    MCH 30.8 03/12/2023 04:01 PM    MCHC 34.3 03/12/2023 04:01 PM    RDW 38.8 03/12/2023 04:01 PM    PLATELETCT 281 03/12/2023 04:01 PM    MPV 9.8 03/12/2023 04:01 PM    NEUTSPOLYS 66.80 03/12/2023 04:01 PM    LYMPHOCYTES 24.80 03/12/2023 04:01 PM    MONOCYTES 6.10 03/12/2023 04:01 PM    EOSINOPHILS 1.40 03/12/2023 04:01 PM    BASOPHILS 0.70 03/12/2023 04:01 PM    IMMGRAN 0.20 03/12/2023 04:01 PM    NRBC 0.00 03/12/2023 04:01 PM    NEUTS 5.61 03/12/2023 04:01 PM    LYMPHS 2.08 03/12/2023 04:01 PM    MONOS 0.51 03/12/2023 04:01 PM    EOS 0.12 03/12/2023 04:01 PM    BASO 0.06 03/12/2023 04:01 PM    IMMGRANAB 0.02 03/12/2023 04:01 PM    NRBCAB 0.00 03/12/2023 04:01 PM        CBC w/o DIFF  Lab Results   Component Value Date/Time    WBC 8.4 03/12/2023 04:01 PM    RBC 4.81 03/12/2023 04:01 PM    HEMOGLOBIN 14.8 03/12/2023 04:01 PM    MCV 89.8 03/12/2023 04:01 PM    MCH 30.8 03/12/2023 04:01 PM    MCHC 34.3 03/12/2023 04:01 PM    RDW " 38.8 03/12/2023 04:01 PM    MPV 9.8 03/12/2023 04:01 PM           EKG interpreted by me: NSR    Impression/Plan:  1. Atrial fibrillation, unspecified type (HCC)  EKG    CBC WITHOUT DIFFERENTIAL    Comp Metabolic Panel        Atrial fibrillation, paroxysmal  Hypercoagulable state due to afib  High risk medication use    - Continue flecainide ECG reviewed  - Continue Eliquis, ordering labs    FV 6 months    Todd Lozada MD  Cardiac Electrophysiology

## 2023-10-17 NOTE — PATIENT INSTRUCTIONS
Stroke Prevention  Some medical conditions and lifestyle choices can lead to a higher risk for a stroke. You can help to prevent a stroke by making nutrition, lifestyle, and other changes.  What nutrition changes can be made?    · Eat healthy foods.  ? Choose foods that are high in fiber. These include:  § Fresh fruits.  § Fresh vegetables.  § Whole grains.  ? Eat at least 5 or more servings of fruits and vegetables each day. Try to fill half of your plate at each meal with fruits and vegetables.  ? Choose lean protein foods. These include:  § Lowfat (lean) cuts of meat.  § Chicken without skin.  § Fish.  § Tofu.  § Beans.  § Nuts.  ? Eat low-fat dairy products.  ? Avoid foods that:  § Are high in salt (sodium).  § Have saturated fat.  § Have trans fat.  § Have cholesterol.  § Are processed.  § Are premade.  · Follow eating guidelines as told by your doctor. These may include:  ? Reducing how many calories you eat and drink each day.  ? Limiting how much salt you eat or drink each day to 1,500 milligrams (mg).  ? Using only healthy fats for cooking. These include:  § Olive oil.  § Canola oil.  § Sunflower oil.  ? Counting how many carbohydrates you eat and drink each day.  What lifestyle changes can be made?  · Try to stay at a healthy weight. Talk to your doctor about what a good weight is for you.  · Get at least 30 minutes of moderate physical activity at least 5 days a week. This can include:  ? Fast walking.  ? Biking.  ? Swimming.  · Do not use any products that have nicotine or tobacco. This includes cigarettes and e-cigarettes. If you need help quitting, ask your doctor. Avoid being around tobacco smoke in general.  · Limit how much alcohol you drink to no more than 1 drink a day for nonpregnant women and 2 drinks a day for men. One drink equals 12 oz of beer, 5 oz of wine, or 1½ oz of hard liquor.  · Do not use drugs.  · Avoid taking birth control pills. Talk to your doctor about the risks of taking birth  "control pills if:  ? You are over 35 years old.  ? You smoke.  ? You get migraines.  ? You have had a blood clot.  What other changes can be made?  · Manage your cholesterol.  ? It is important to eat a healthy diet.  ? If your cholesterol cannot be managed through your diet, you may also need to take medicines. Take medicines as told by your doctor.  · Manage your diabetes.  ? It is important to eat a healthy diet and to exercise regularly.  ? If your blood sugar cannot be managed through diet and exercise, you may need to take medicines. Take medicines as told by your doctor.  · Control your high blood pressure (hypertension).  ? Try to keep your blood pressure below 130/80. This can help lower your risk of stroke.  ? It is important to eat a healthy diet and to exercise regularly.  ? If your blood pressure cannot be managed through diet and exercise, you may need to take medicines. Take medicines as told by your doctor.  ? Ask your doctor if you should check your blood pressure at home.  ? Have your blood pressure checked every year. Do this even if your blood pressure is normal.  · Talk to your doctor about getting checked for a sleep disorder. Signs of this can include:  ? Snoring a lot.  ? Feeling very tired.  · Take over-the-counter and prescription medicines only as told by your doctor. These may include aspirin or blood thinners (antiplatelets or anticoagulants).  · Make sure that any other medical conditions you have are managed.  Where to find more information  · American Stroke Association: www.strokeassociation.org  · National Stroke Association: www.stroke.org  Get help right away if:  · You have any symptoms of stroke. \"BE FAST\" is an easy way to remember the main warning signs:  ? B - Balance. Signs are dizziness, sudden trouble walking, or loss of balance.  ? E - Eyes. Signs are trouble seeing or a sudden change in how you see.  ? F - Face. Signs are sudden weakness or loss of feeling of the face, " or the face or eyelid drooping on one side.  ? A - Arms. Signs are weakness or loss of feeling in an arm. This happens suddenly and usually on one side of the body.  ? S - Speech. Signs are sudden trouble speaking, slurred speech, or trouble understanding what people say.  ? T - Time. Time to call emergency services. Write down what time symptoms started.  · You have other signs of stroke, such as:  ? A sudden, very bad headache with no known cause.  ? Feeling sick to your stomach (nausea).  ? Throwing up (vomiting).  ? Jerky movements you cannot control (seizure).  These symptoms may represent a serious problem that is an emergency. Do not wait to see if the symptoms will go away. Get medical help right away. Call your local emergency services (911 in the [place] Do not drive yourself to the hospital.  Summary  · You can prevent a stroke by eating healthy, exercising, not smoking, drinking less alcohol, and treating other health problems, such as diabetes, high blood pressure, or high cholesterol.  · Do not use any products that contain nicotine or tobacco, such as cigarettes and e-cigarettes.  · Get help right away if you have any signs or symptoms of a stroke.  This information is not intended to replace advice given to you by your health care provider. Make sure you discuss any questions you have with your health care provider.  Document Released: 06/18/2013 Document Revised: 02/13/2020 Document Reviewed: 03/21/2018  Elsevier Patient Education © 2020 Elsevier Inc.  Atrial Fibrillation    Atrial fibrillation is a type of heartbeat that is irregular or fast (rapid). If you have this condition, your heart beats without any order. This makes it hard for your heart to pump blood in a normal way. Having this condition gives you more risk for stroke, heart failure, and other heart problems.  Atrial fibrillation may start all of a sudden and then stop on its own, or it may become a long-lasting problem.  What are the  causes?  This condition may be caused by heart conditions, such as:  · High blood pressure.  · Heart failure.  · Heart valve disease.  · Heart surgery.  Other causes include:  · Pneumonia.  · Obstructive sleep apnea.  · Lung cancer.  · Thyroid disease.  · Drinking too much alcohol.  Sometimes the cause is not known.  What increases the risk?  You are more likely to develop this condition if:  · You smoke.  · You are older.  · You have diabetes.  · You are overweight.  · You have a family history of this condition.  · You exercise often and hard.  What are the signs or symptoms?  Common symptoms of this condition include:  · A feeling like your heart is beating very fast.  · Chest pain.  · Feeling short of breath.  · Feeling light-headed or weak.  · Getting tired easily.  Follow these instructions at home:  Medicines  · Take over-the-counter and prescription medicines only as told by your doctor.  · If your doctor gives you a blood-thinning medicine, take it exactly as told. Taking too much of it can cause bleeding. Taking too little of it does not protect you against clots. Clots can cause a stroke.  Lifestyle         · Do not use any tobacco products. These include cigarettes, chewing tobacco, and e-cigarettes. If you need help quitting, ask your doctor.  · Do not drink alcohol.  · Do not drink beverages that have caffeine. These include coffee, soda, and tea.  · Follow diet instructions as told by your doctor.  · Exercise regularly as told by your doctor.  General instructions  · If you have a condition that causes breathing to stop for a short period of time (apnea), treat it as told by your doctor.  · Keep a healthy weight. Do not use diet pills unless your doctor says they are safe for you. Diet pills may make heart problems worse.  · Keep all follow-up visits as told by your doctor. This is important.  Contact a doctor if:  · You notice a change in the speed, rhythm, or strength of your heartbeat.  · You are  "taking a blood-thinning medicine and you see more bruising.  · You get tired more easily when you move or exercise.  · You have a sudden change in weight.  Get help right away if:    · You have pain in your chest or your belly (abdomen).  · You have trouble breathing.  · You have blood in your vomit, poop, or pee (urine).  · You have any signs of a stroke. \"BE FAST\" is an easy way to remember the main warning signs:  ? B - Balance. Signs are dizziness, sudden trouble walking, or loss of balance.  ? E - Eyes. Signs are trouble seeing or a change in how you see.  ? F - Face. Signs are sudden weakness or loss of feeling in the face, or the face or eyelid drooping on one side.  ? A - Arms. Signs are weakness or loss of feeling in an arm. This happens suddenly and usually on one side of the body.  ? S - Speech. Signs are sudden trouble speaking, slurred speech, or trouble understanding what people say.  ? T - Time. Time to call emergency services. Write down what time symptoms started.  · You have other signs of a stroke, such as:  ? A sudden, very bad headache with no known cause.  ? Feeling sick to your stomach (nausea).  ? Throwing up (vomiting).  ? Jerky movements you cannot control (seizure).  These symptoms may be an emergency. Do not wait to see if the symptoms will go away. Get medical help right away. Call your local emergency services (911 in the [place] Do not drive yourself to the hospital.  Summary  · Atrial fibrillation is a type of heartbeat that is irregular or fast (rapid).  · You are at higher risk of this condition if you smoke, are older, have diabetes, or are overweight.  · Follow your doctor's instructions about medicines, diet, exercise, and follow-up visits.  · Get help right away if you think that you have signs of a stroke.  This information is not intended to replace advice given to you by your health care provider. Make sure you discuss any questions you have with your health care " provider.  Document Released: 09/26/2009 Document Revised: 02/21/2019 Document Reviewed: 02/08/2019  Elsevier Patient Education © 2020 Elsevier Inc.     CSE

## 2023-11-10 DIAGNOSIS — E78.5 DYSLIPIDEMIA: ICD-10-CM

## 2023-11-16 LAB — EKG IMPRESSION: NORMAL

## 2023-11-16 PROCEDURE — 93010 ELECTROCARDIOGRAM REPORT: CPT | Performed by: INTERNAL MEDICINE

## 2023-11-29 ENCOUNTER — PATIENT MESSAGE (OUTPATIENT)
Dept: HEALTH INFORMATION MANAGEMENT | Facility: OTHER | Age: 83
End: 2023-11-29

## 2023-12-04 DIAGNOSIS — E78.5 DYSLIPIDEMIA: ICD-10-CM

## 2023-12-15 ENCOUNTER — TELEPHONE (OUTPATIENT)
Dept: CARDIOLOGY | Facility: MEDICAL CENTER | Age: 83
End: 2023-12-15
Payer: MEDICARE

## 2023-12-15 DIAGNOSIS — Z82.49 FAMILY HISTORY OF HEART DISEASE: ICD-10-CM

## 2023-12-15 DIAGNOSIS — G45.9 TIA (TRANSIENT ISCHEMIC ATTACK): ICD-10-CM

## 2023-12-15 DIAGNOSIS — E78.5 DYSLIPIDEMIA: ICD-10-CM

## 2023-12-15 NOTE — TELEPHONE ENCOUNTER
Received [employer] [facility] request via [employer]  for Eliquis 2.5 mg tab. (Quantity:30, Day Supply:30)     Insurance: [employer]  Member ID:  [id number]  BIN: [id number]  PCN: [id number]   Group: [id number]     Ran Test claim via [employer] [facility] medication Pays for a $514.54 copay. Will outreach to patient to offer specialty pharmacy services and or release to preferred pharmacy

## 2023-12-15 NOTE — TELEPHONE ENCOUNTER
Received request via: Pharmacy    Was the patient seen in the last year in this department? Yes    Does the patient have an active prescription (recently filled or refills available) for medication(s) requested? Yes.     Does the patient have [facility] Plus and need 100 day supply (blood pressure, diabetes and cholesterol meds only)? Patient does not have SCP    Patient is out of the medication and would like the refill sent to [facility].    Thank you,     Joelle PANTOJA

## 2023-12-19 ENCOUNTER — TELEPHONE (OUTPATIENT)
Dept: CARDIOLOGY | Facility: MEDICAL CENTER | Age: 83
End: 2023-12-19
Payer: MEDICARE

## 2023-12-19 NOTE — TELEPHONE ENCOUNTER
Attempted to contact patient at [phone] to discuss [facility] and services/benefits offered. No answer, left voicemail.    Valerie Buckner

## 2023-12-26 DIAGNOSIS — E78.5 DYSLIPIDEMIA: ICD-10-CM

## 2023-12-28 RX ORDER — EVOLOCUMAB 140 MG/ML
INJECTION, SOLUTION SUBCUTANEOUS
Qty: 6 ML | Refills: 3 | OUTPATIENT
Start: 2023-12-28

## 2024-01-02 ENCOUNTER — TELEPHONE (OUTPATIENT)
Dept: CARDIOLOGY | Facility: MEDICAL CENTER | Age: 84
End: 2024-01-02
Payer: MEDICARE

## 2024-01-02 DIAGNOSIS — E78.5 DYSLIPIDEMIA: ICD-10-CM

## 2024-01-02 NOTE — TELEPHONE ENCOUNTER
MC- Please advise on medication, okay to refill? Or would you like patient to scheduled with gen card?

## 2024-01-02 NOTE — TELEPHONE ENCOUNTER
NAYELI    Caller:  [facility]    Topic/issue: Has question on prescription for:   Evolocumab (REPATHA) 140 MG/ML Solution Auto-injector SubQ injection pen [[id number]]     Callback Number:  [phone]  Ref # [id number]    Thank you,   Joyce STONER

## 2024-01-02 NOTE — TELEPHONE ENCOUNTER
Todd Lozada M.D.  You13 minutes ago (10:03 AM)       I don't prescribe this so this will have to be addressed by their general cardiologist or ADD.

## 2024-01-05 DIAGNOSIS — E78.5 DYSLIPIDEMIA: ICD-10-CM

## 2024-01-05 NOTE — TELEPHONE ENCOUNTER
Received request via: Patient    Was the patient seen in the last year in this department? Yes    Does the patient have an active prescription (recently filled or refills available) for medication(s) requested?  Yes    Does the patient have care home Plus and need 100 day supply (blood pressure, diabetes and cholesterol meds only)? Patient does not have SCP    Thank you,     Alfredo KHAN

## 2024-01-08 ENCOUNTER — TELEPHONE (OUTPATIENT)
Dept: SCHEDULING | Facility: IMAGING CENTER | Age: 84
End: 2024-01-08
Payer: MEDICARE

## 2024-01-08 DIAGNOSIS — E78.5 DYSLIPIDEMIA: ICD-10-CM

## 2024-01-08 NOTE — TELEPHONE ENCOUNTER
Caller: Naveed (son)    Medication Name and Dosage:    Evolocumab (REPATHA) 140 MG/ML Solution Auto-injector SubQ injection pen [[id number]]     Medication amount left: 1    Preferred Pharmacy: [facility].     Other questions (Topic): Script went to [facility]    Callback Number (Will only call for issues): [phone]    Thank you,   Joyce PENA    None

## 2024-03-27 DIAGNOSIS — Z82.49 FAMILY HISTORY OF HEART DISEASE: ICD-10-CM

## 2024-03-27 DIAGNOSIS — E78.5 DYSLIPIDEMIA: ICD-10-CM

## 2024-03-27 DIAGNOSIS — G45.9 TIA (TRANSIENT ISCHEMIC ATTACK): ICD-10-CM

## 2024-03-27 RX ORDER — APIXABAN 2.5 MG/1
2.5 TABLET, FILM COATED ORAL 2 TIMES DAILY
Qty: 180 TABLET | Refills: 2 | Status: SHIPPED | OUTPATIENT
Start: 2024-03-27

## 2024-03-29 ENCOUNTER — TELEPHONE (OUTPATIENT)
Dept: CARDIOLOGY | Facility: MEDICAL CENTER | Age: 84
End: 2024-03-29
Payer: MEDICARE

## 2024-04-03 DIAGNOSIS — I48.91 ATRIAL FIBRILLATION, UNSPECIFIED TYPE (HCC): ICD-10-CM

## 2024-04-03 RX ORDER — FLECAINIDE ACETATE 100 MG/1
100 TABLET ORAL 2 TIMES DAILY
Qty: 180 TABLET | Refills: 0 | Status: SHIPPED | OUTPATIENT
Start: 2024-04-03

## 2024-04-15 ENCOUNTER — OFFICE VISIT (OUTPATIENT)
Dept: NEUROLOGY | Facility: MEDICAL CENTER | Age: 84
End: 2024-04-15
Attending: PSYCHIATRY & NEUROLOGY
Payer: MEDICARE

## 2024-04-15 VITALS
BODY MASS INDEX: 23.01 KG/M2 | DIASTOLIC BLOOD PRESSURE: 60 MMHG | WEIGHT: 146.61 LBS | TEMPERATURE: 96.4 F | SYSTOLIC BLOOD PRESSURE: 112 MMHG | HEIGHT: 67 IN | OXYGEN SATURATION: 95 % | HEART RATE: 55 BPM

## 2024-04-15 DIAGNOSIS — G30.1 MILD LATE ONSET ALZHEIMER'S DEMENTIA WITHOUT BEHAVIORAL DISTURBANCE, PSYCHOTIC DISTURBANCE, MOOD DISTURBANCE, OR ANXIETY (HCC): Primary | ICD-10-CM

## 2024-04-15 DIAGNOSIS — F02.A0 MILD LATE ONSET ALZHEIMER'S DEMENTIA WITHOUT BEHAVIORAL DISTURBANCE, PSYCHOTIC DISTURBANCE, MOOD DISTURBANCE, OR ANXIETY (HCC): Primary | ICD-10-CM

## 2024-04-15 PROCEDURE — 99215 OFFICE O/P EST HI 40 MIN: CPT | Performed by: PSYCHIATRY & NEUROLOGY

## 2024-04-15 PROCEDURE — 3078F DIAST BP <80 MM HG: CPT | Performed by: PSYCHIATRY & NEUROLOGY

## 2024-04-15 PROCEDURE — 3074F SYST BP LT 130 MM HG: CPT | Performed by: PSYCHIATRY & NEUROLOGY

## 2024-04-15 PROCEDURE — 99212 OFFICE O/P EST SF 10 MIN: CPT | Performed by: PSYCHIATRY & NEUROLOGY

## 2024-04-15 ASSESSMENT — MONTREAL COGNITIVE ASSESSMENT (MOCA)
11. FOR EACH PAIR OF WORDS, WHAT CATEGORY DO THEY BELONG TO (OUT OF 2): 1/2
1. ALTERNATING TRAIL MAKING: 0/1
3. DRAW A CLOCK: CONTOUR, NUMBERS, HANDS: 1/3
10. [FLUENCY] NAME WORDS STARTING WITH DESIGNATED LETTER: 0/1
ORIENTATION SUBSCORE: 2/6
5. MEMORY TRIALS: SECOND TRIAL
DELAYED RECALL SUBSCORE: 0/5
8. SERIAL SUBTRACTION OF 7S: 0/5
CATEGORY CUE (IF APPLICABLE): 3
CATEGORY CUE (IF APPLICABLE): 2
9. REPEAT EACH SENTENCE: 1/2
6. READ LIST OF DIGITS [FORWARD/BACKWARD]: 2/2
WHAT IS THE TOTAL SCORE (OUT OF 30): 11
7. [VIGILENCE] TAP WHEN HEARING DESIGNATED LETTER: 1/1
WHAT IS THE VERSION OF MOCA ADMINISTERED: 7.3
4. NAME EACH OF THE THREE ANIMALS SHOWN: 3/3

## 2024-04-15 ASSESSMENT — FIBROSIS 4 INDEX: FIB4 SCORE: 1.69

## 2024-04-15 ASSESSMENT — PATIENT HEALTH QUESTIONNAIRE - PHQ9: CLINICAL INTERPRETATION OF PHQ2 SCORE: 0

## 2024-04-15 NOTE — PROGRESS NOTES
"Neuro follow up visit:    Here with  ( x 23 years or so)      Gloria Herndon 83  y.o. right handed woman who was evaluated back in late 2021 after having a TIA (single) event. She was seen in the Neurology outpatient clinic by Lois LANDRY (her note was reviewed by me).     She was the [employer] [facility].     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 Additional relevant medical history is notable for a TIA in October 2020.     Dr. Herndon was seen at the [facility] by Dr. Juan Kiran MD, in July 2020. During this visit, she reported a four-year history of word-finding difficulties during speech and in her writing. 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 During this period, Dr. Herndon was diagnosed with ADD and prescribed Ritalin with some success in improving concentration. 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 She also reported some memory difficulties, including forgetting whether or not she completed specific tasks (e.g. paid the bills, ate a meal), how to navigate certain computer " "tasks, and the date and plans for the day. 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 Dr. Herndon has developed compensatory strategies to ensure that she does not forget to pay the bills or take her medications (uses a pillbox) and continues to manage all ADLs and IADLs independently. She continues to drive, but asks family members to assist at times.     Her neurological exam, conducted via video by Dr. Kiran, was notable for some word finding trouble, but otherwise unremarkable. She scored 28/30 on a brief cognitive screener (MMSE), demonstrating mild weakness in working memory. Her clinical history was considered consistent with a diagnosis of mild cognitive impairment (MCI). \"     Neuroimaging     FDG PET-CT scan (March 2016) was read as normal. Dr. Kiran reviewed this scan and identified possible evidence for “slight biparietal hypometabolism, but not enough to be clearly abnormal” and no significant atrophy on the accompanying CT scan.     \"The etiology is unclear and likely multi-factorial.  Rennyliz demonstrated solid retention of verbal information across an hour delay, recalling 100% of the words that she initially learned. This pattern is atypical in individuals with early stages of Alzheimer’s disease. 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 Given Christina Yanick’s history of TIA, and family history of cardiovascular conditions, a vascular contribution is possible.\"    Had single Variant (E4) of APOE Gene tested in 2016.        Historically  related that back in 2016 there was an issue that may develop Alzheimer's Disease given her mother had late onset " "Alzheimer's Disease and  at age 97.     About 4-5 years ago there were subtle changes in her ability to come up with words and she was doing a book tour and she had concern about her speech and whether this would effect her book tour.     Over the last few years there has been subtle difficulty with word retrievals (not clear progression per  in this issue in the last 12 months).     Continued to read the Newspaper and TV and does not clearly identify any problems reading in the last 3-6 months and \"reads all the time\" and frequently reads several books every few weeks.     No sundowning noticed in the recent months.    There is some difficulty with using a computer in the last 2 years such as  how to do an email or a text message and she could do this without difficulties about 3-4 years ago.     There has been no scott behavioral changes, mood changes or psychotic features in the recent months.     There are no parkinsonian features evolving in the last 6-12 months or so.     There are no dysarthric symptoms or dysphagic symptoms in the last 12 months or so.      Patient Active Problem List    Diagnosis Date Noted    Mild late onset Alzheimer's dementia without behavioral disturbance, psychotic disturbance, mood disturbance, or anxiety (HCC) 2023    TIA (transient ischemic attack) 10/06/2020    Mild cognitive impairment 10/06/2020    Dyslipidemia 10/06/2020    History of atrial fibrillation 10/06/2020    Family history of heart disease 2014       Past medical history:   Past Medical History:   Diagnosis Date    Arrhythmia     Pt states she has Atrial Fib    Asthma     Atrial fibrillation (HCC)     High cholesterol     History of cardiac radiofrequency ablation 2017    TIA (transient ischemic attack) 10/2020       Past surgical history:   Past Surgical History:   Procedure Laterality Date    CHOLECYSTECTOMY  2003    OTHER Bilateral     Lens 2013    OTHER CARDIAC SURGERY      " ablation         Social history:   Social History     Socioeconomic History    Marital status:      Spouse name: Not on file    Number of children: Not on file    Years of education: Not on file    Highest education level: Not on file   Occupational History    Not on file   Tobacco Use    Smoking status: Never    Smokeless tobacco: Never   Vaping Use    Vaping Use: Never used   Substance and Sexual Activity    Alcohol use: Not Currently     Comment: very rare glass of wine    Drug use: Not Currently    Sexual activity: Not on file   Other Topics Concern    Not on file   Social History Narrative    Not on file     Social Determinants of Health     Financial Resource Strain: Not on file   Food Insecurity: Not on file   Transportation Needs: Not on file   Physical Activity: Not on file   Stress: Not on file   Social Connections: Not on file   Intimate Partner Violence: Not on file   Housing Stability: Not on file       Family history:   No family history on file.      Current medications:   Current Outpatient Medications   Medication    flecainide (TAMBOCOR) 100 MG Tab    ELIQUIS 2.5 MG Tab    Evolocumab (REPATHA) 140 MG/ML Solution Auto-injector SubQ injection pen    metoprolol SR (TOPROL XL) 100 MG TABLET SR 24 HR    clonazePAM (KLONOPIN) 0.5 MG Tab    estrogens, conjugated (PREMARIN) 0.3 MG Tab     No current facility-administered medications for this visit.       Medication Allergy:  Allergies   Allergen Reactions    Blue Dyes Vomiting    Green Dyes Vomiting    Iodine Hives    Iodine Solution [Povidone Iodine] Unspecified     Red spots, pain    Omeprazole Unspecified     Urinary incontinence     Tape Unspecified     Welts on skin, paper tape is ok           Physical examination:   Vitals:    04/15/24 1039   BP: 112/60   BP Location: Left arm   Patient Position: Sitting   BP Cuff Size: Adult   Pulse: (!) 55   Temp: (!) 35.8 °C (96.4 °F)   TempSrc: Temporal   SpO2: 95%   Weight: 66.5 kg (146 lb 9.7 oz)  "  Height: 1.702 m (5' 7.01\")       Normal cephalic atraumatic.  There is full range of movement around the neck in all directions without restrictions or discrete pain evoked triggers.  No lower extremity edema.      Neurological  Exam:      [place] Cognitive Assessment (MOCA) Version 7.1    Years of Education: MD/PhD    TOTAL SCORE:   (to be scanned into the MEDIA section in the [facility]    [place] Cognitive Assessment (MOCA) Version Number: 7.3   VISUOSPACIAL / EXECUTIVE   Clock Drawin/3 (she could not place numbers on clock when asked or figure out where hands go on time given)  Spatial Drawin/1 (Trails task was very difficult for her to do)  Cube Drawing:  (cylinder was drawn small (1/3 normal size))    NAMING  Naming: 3/3 (could not spell [place] > spelled it \"[place]\")    MEMORY  Memory: Second trial    ATTENTION  Digits: 2/2  Letters: 1/1  Subtraction: 0/5 (this was very difficulty for her to do)    LANGUAGE  Repeat Phrases: 1/2  Fluency: 0/1 (stated 3 words that begin with a B at 1 minute)    ABSTRACTION  Abstraction: 1/2 (very concrete answer given to \"eye vs  ear\" statement > \"has to do with hearing and seeing\")    DELAYED RECALL  Recall words: 0/5  Category Cue (if applicable): 3 (got train,egg and hat with category cuing)  Multiple Choice Cue (if applicable):2 (got chair with multiple choice cues and only got \"blue\" with multiple choice cuing)    ORIENTATION  Orientation: 2/6 (did not know the date,month,year (had no idea the year) or city where she present was at this time.)    Add 1 point if less than or equal to 12 yr education level:     MOCA TOTAL SCORE:    Biomechanical/Visual Limitations (if applicable):        She thought she was born in  or \"9 something\".    She had difficulty telling me the road she lived on in [place] (and could not tell me that without very significant prompting).    She could not tell me her phone # when asked.    She stated there are 4 " "quarters in 1 dollars but there are NINE Quarters in 3 dollars (second answer \"12\")    2 dimes,1 nickel, 1 mary>  15 \"something\"      Mental status: Awake, alert and fully oriented to person and situation. Normal attention and concentration.  Did not appear/act combative,irritable,anxious,paranoid/delusional or aggressive to or with me.    Speech and language: Speech is with reduced fluency but understandable.     Follows 2 step motor commands in sequence without significant delay and correctly.    Cranial nerve exam:  II: Pupils are equally round and reactive to light. Visual fields are intact by confrontation.  III, IV, VI: EOMI, no diplopia, no ptosis.  V: Sensation to light touch is normal over V1-3 distributions bilaterally.  .  VII: Facial movements are symmetrical. There is no facial droop. .  VIII: Hearing intact to soft speech and finger rub bilaterally  IX: Palate elevates symmetrically, uvula is midline. Dysarthria is not present.  XI: Shoulder shrug are symmetrical and strong.   XII: Tongue protrudes midline.      Motor exam:  Muscle tone is normal in all 4 limbs.    Muscle strength:    Neck Flexors/Extensors: 5/5       Right  Left  Deltoid   5/5  5/5      Biceps   5/5  5/5  Triceps              5/5  5/5   Wrist extensors 5/5  5/5  Wrist flexors  5/5  5/5     5/5  5/5  Interossei  5/5  5/5  Thenar (APB)  5/5  5/5   Hip flexors  5/5  5/5  Quadriceps  5/5  5/5    Hamstrings  5/5  5/5  Dorsiflexors  5/5  5/5  Plantarflexors  5/5  5/5  Toe extension  5/5  5/5      Frontal release signs are absent    bilaterally toes are downgoing to plantar stimulation..    Coordination (finger-to-nose, heel/knee/shin, rapid alternating movements) was normal.     There was no ataxia, no tremors, and no dysmetria.     Station and gait > easily stands up from exam chair without retropulsion,veering,leaning,swaying (to either side).       Labs and Tests:    Component  Ref Range & Units 1 mo ago   VITAMIN D TOTAL (25OH)  30 " "- 100 ng/mL 45   Resulting Agency [facility]   Narrative  Performed by [facility]    Vitamin D        Reference  (25OH) Status     Range (ng/mL)  -------------------------------  Deficient:            <20  Insufficient:        20-29  Sufficient:            Upper Safety Limit:   >100      Specimen Collected: 02/29/24  8:16 AM       TSH  Order: [id number]  Component  Ref Range & Units 1 mo ago   TSH  0.34 - 5.60 uIU/mL 2.96   Resulting Agency [facility]     Specimen Collected: 02/29/24  8:16 AM    Performed by: [facility] Last Resulted: 02/29/24  1:52 PM       SODIUM  132 - 144 mmol/L 138   POTASSIUM  3.5 - 5.3 mmol/L 3.9   CHLORIDE  100 - 112 mmol/L 103   CO2  23 - 29 mmol/L 26   CALCIUM  8.4 - 10.3 mg/dL 9.3   BUN  8 - 25 mg/dL 18   CREATININE  0.40 - 1.10 mg/dL 0.94   Comment: The GFR result is not clinically significant on patients <18 or >70 years of age.   GLUCOSE  74 - 99 mg/dL 97   TOTAL PROTEIN  5.2 - 8.9 g/dL 7.0   ALBUMIN  3.2 - 5.4 g/dL 3.8   BILIRUBIN TOTAL  0.0 - 1.3 mg/dL 0.8   ALKALINE PHOSPHATASE  39 - 117 U/L 72   AST  0 - 33 U/L 26   ALT  0 - 34 U/L 20   GFR  >=60 mL/min/1.73 sq meter 60   Comment: eGFR calculated with 2021 CKD-EPI equation.  Vegetarian diet, extremely high or low muscle mass, and pregnancy may affect results.  Cystatin C with Glomerular Filtration Rate is a suitable alternative for these patients.  \"The National Kidney Foundation and the American Society of Nephrology (NKF-ASN) recommends using the 2021 CKD Epidemiology Collaboration (CKD-EPI) equation to calculate estimated glomerular filtration rate (eGFR). This equation is only applicable to [place] adult patients and removes race as a variable.  Due to the variation of creatinine in different conditions, they recommend use of confirmatory tests such as eGFR from cystatin C or creatinine-cystatin " "GFR estimating equations, or direct measurement of clearance of an exogenous filtration marker in critical clinical decisions including but not limited to drug dosing, surgery, chemotherapy, and transplant referral.\"   ANION GAP  3 - 11 mmol/L 9       Impression/Plans/Recommendations:        Late Mild Stage of Alzheimer's Dementia (probable Alzheimer's Disease)     Very gradual and slowly progressive changes in short term memory,word fluency,word retrieval      Brain MRI reviewed from 10/2020 and showed no ischemic changes of significance but did show atleast mild cerebral atrophy.     ADL and IADL's filled out by .    The Alzheimer's Questionnaire (AQ) score today of 20 per .     Today, I reviewed the clinical criteria and reviewed several  scenarios of the differences being using the medical terms of normal brain aging (age associated memory impairment),  Mild Cognitive Impairment (MCI) and Dementia.    Dementia  is a syndrome but statistically and for the majority of patients  occurs due  to a more rapid aging of the brain tissue or potentially from injury to certain parts of one's brain ( often from such contributing factors as  the cumulative effects of alcohol, from one or more ischemic or hemmorhagic stroke(s), from neurodegeneration or long standing with/without suboptimally controlled Hypertension). It is for some of these potential factors as to why I recommend a brain imaging test (Head CT or Brain MRI) be done for the 1st time or in certain circumstances repeated for comparison purposes  as such imaging can suggest one or more factors as to the reason(s) for the person's cognitive/memory changes. In fact, a normal or \"age related\" finding on a brain imaging test in and of itself is useful clinical and objective information to have in the evaluation of a person who has either an acute, evolving or even chronic (months to years) long cognitive/memory complaint.     Additional factors or " contributors to Brain Health issues can be summarized in several books/references which I discussed as well today.      Goals going forwards include:     A. Paying attention to one's risk factors and reducing their prevalence or potential impact on one's changing memory/thinking> an excellent example would be to stop smoking, reduce or eliminate alcohol use (depending on the amount and frequency of usage), maintain good blood pressure control by buying a digital arm blood pressure cuff such as an OMRON Series 3 or 5 and checking one's blood pressure atleast 3 days per week (in the am and early afternoon) that the numbers are under 140/90 and working as needed with the primary care doctor  to optimize blood pressure control).     B. Encouraging proper sleep hygiene which for most adults is 7 to 8 hours of uninterrupted sleep per night.       C. Encouraging some form of exercise preferable aerobic forms to be done (4 to 5 days per week- 30 minutes minimum per day)> 150 minutes per week as a goal. Example activities could include fast walking (up a slight incline), jogging, cycling (road or stationary), swimming,tennis. Essentially, even basic walking on a flat surface or a treadmill would be better than doing nothing.     D. Maintaining or forming new social contacts with family and friends  and a positive attitude despite the concerns and/or ongoing issues with thinking and/or memory.     E. Eating well which means a diet low in salt  (under 2 grams per day), sugar and saturated fat.     F. Consideration of the use of cognitive enhancers (acetylcholinerase inhibitors such as Aricept vs an NMDA Receptor agent like Namenda). Pros and cons of such compounds in terms of predicted efficacy and side effects profiles reviewed.      H. Will order some vitamin levels and will hold off on doing a Brain PET Scan or Brain MRI at this time after discussing these issues with Gloria and her  today.     I. Brain  Healthy Diet  (MIND Diet reviewed) today.     JACOB Castro will  not pursue anti amyloid IV medications after discussion with Gloria and her  of risks vs benefits (including she is on anticoagulation to reduce stroke risk given Afib).  Handout given to  on the IV anti amyloid compounds.      We spent about 48  minutes discussing the above issues and I answered their questions about Leqembi.

## 2024-04-22 DIAGNOSIS — Z82.49 FAMILY HISTORY OF HEART DISEASE: ICD-10-CM

## 2024-04-22 DIAGNOSIS — G45.9 TIA (TRANSIENT ISCHEMIC ATTACK): ICD-10-CM

## 2024-04-22 DIAGNOSIS — E78.5 DYSLIPIDEMIA: ICD-10-CM

## 2024-04-22 NOTE — TELEPHONE ENCOUNTER
Received request via: Patient    Was the patient seen in the last year in this department? Yes 10.04.23    Does the patient have an active prescription (recently filled or refills available) for medication(s) requested? No    Pharmacy Name: [facility]     Does the patient have nursing home Plus and need 100 day supply (blood pressure, diabetes and cholesterol meds only)? Patient does not have SCP    Thank you,     Joelle PANTOJA

## 2024-05-06 ENCOUNTER — OFFICE VISIT (OUTPATIENT)
Dept: CARDIOLOGY | Facility: MEDICAL CENTER | Age: 84
End: 2024-05-06
Attending: NURSE PRACTITIONER
Payer: MEDICARE

## 2024-05-06 VITALS
WEIGHT: 145.2 LBS | HEIGHT: 67 IN | OXYGEN SATURATION: 95 % | BODY MASS INDEX: 22.79 KG/M2 | HEART RATE: 66 BPM | RESPIRATION RATE: 16 BRPM | SYSTOLIC BLOOD PRESSURE: 120 MMHG | DIASTOLIC BLOOD PRESSURE: 70 MMHG

## 2024-05-06 DIAGNOSIS — Z86.79 HISTORY OF ATRIAL FIBRILLATION: ICD-10-CM

## 2024-05-06 DIAGNOSIS — E78.5 DYSLIPIDEMIA: ICD-10-CM

## 2024-05-06 DIAGNOSIS — I48.91 ATRIAL FIBRILLATION, UNSPECIFIED TYPE (HCC): ICD-10-CM

## 2024-05-06 DIAGNOSIS — G45.9 TIA (TRANSIENT ISCHEMIC ATTACK): ICD-10-CM

## 2024-05-06 DIAGNOSIS — Z82.49 FAMILY HISTORY OF HEART DISEASE: ICD-10-CM

## 2024-05-06 DIAGNOSIS — Z51.81 ENCOUNTER FOR MONITORING FLECAINIDE THERAPY: ICD-10-CM

## 2024-05-06 DIAGNOSIS — Z79.899 ENCOUNTER FOR MONITORING FLECAINIDE THERAPY: ICD-10-CM

## 2024-05-06 LAB — EKG IMPRESSION: NORMAL

## 2024-05-06 PROCEDURE — 99214 OFFICE O/P EST MOD 30 MIN: CPT | Performed by: NURSE PRACTITIONER

## 2024-05-06 PROCEDURE — 3074F SYST BP LT 130 MM HG: CPT | Performed by: NURSE PRACTITIONER

## 2024-05-06 PROCEDURE — 3078F DIAST BP <80 MM HG: CPT | Performed by: NURSE PRACTITIONER

## 2024-05-06 RX ORDER — METOPROLOL SUCCINATE 100 MG/1
100 TABLET, EXTENDED RELEASE ORAL DAILY
Qty: 90 TABLET | Refills: 3 | Status: SHIPPED | OUTPATIENT
Start: 2024-05-06

## 2024-05-06 RX ORDER — FLECAINIDE ACETATE 100 MG/1
100 TABLET ORAL 2 TIMES DAILY
Qty: 200 TABLET | Refills: 3 | Status: SHIPPED | OUTPATIENT
Start: 2024-05-06

## 2024-05-06 ASSESSMENT — ENCOUNTER SYMPTOMS
MUSCULOSKELETAL NEGATIVE: 1
NERVOUS/ANXIOUS: 0
EYES NEGATIVE: 1
RESPIRATORY NEGATIVE: 1
ORTHOPNEA: 0
NAUSEA: 0
DIZZINESS: 0
DEPRESSION: 0
CLAUDICATION: 0
WHEEZING: 0
NEUROLOGICAL NEGATIVE: 1
GASTROINTESTINAL NEGATIVE: 1
BRUISES/BLEEDS EASILY: 0
CONSTITUTIONAL NEGATIVE: 1
PALPITATIONS: 0
SHORTNESS OF BREATH: 0
PND: 0
HALLUCINATIONS: 0
SENSORY CHANGE: 0

## 2024-05-06 ASSESSMENT — FIBROSIS 4 INDEX: FIB4 SCORE: 1.69

## 2024-05-06 NOTE — ASSESSMENT & PLAN NOTE
- Eliquis twice daily for stroke protection   - flecainide for rhythm control   - metoprolol for rate control

## 2024-05-06 NOTE — PROGRESS NOTES
Atrial Fibrillation Follow up Note    DOS: 5/6/2024  [id number]  Gloria Herndon    Chief complaint/Reason for consult: longitudinal atrial fibrillation care    HPI: Pt is a 83 y.o. female who presents to the clinic today in follow up for antiarrhythmic and chronic anticoagulation. Patient has a past medical history significant for but not limited to: atrial fibrillation, TIA, dyslipidemia, Alzheimer. Patient doing well with no recurrent palpitations. No issues with Eliquis dose. Continue rate and rhythm control strategy with every 6 month follow ups.       Past Medical History:   Diagnosis Date    Arrhythmia     Pt states she has Atrial Fib    Asthma     Atrial fibrillation (HCC)     High cholesterol     History of cardiac radiofrequency ablation 12/01/2017    TIA (transient ischemic attack) 10/2020       Past Surgical History:   Procedure Laterality Date    CHOLECYSTECTOMY  08/08/2003    OTHER Bilateral     Lens 2013    OTHER CARDIAC SURGERY      ablation       Social History     Socioeconomic History    Marital status:      Spouse name: Not on file    Number of children: Not on file    Years of education: Not on file    Highest education level: Not on file   Occupational History    Not on file   Tobacco Use    Smoking status: Never    Smokeless tobacco: Never   Vaping Use    Vaping Use: Never used   Substance and Sexual Activity    Alcohol use: Not Currently     Comment: very rare glass of wine    Drug use: Not Currently    Sexual activity: Not on file   Other Topics Concern    Not on file   Social History Narrative    Not on file     Social Determinants of Health     Financial Resource Strain: Not on file   Food Insecurity: Not on file   Transportation Needs: Not on file   Physical Activity: Not on file   Stress: Not on file   Social Connections: Not on file   Intimate Partner Violence: Not on file   Housing Stability: Not on file       History reviewed. No pertinent family history.    Allergies    Allergen Reactions    Blue Dyes Vomiting    Green Dyes Vomiting    Iodine Hives    Iodine Solution [Povidone Iodine] Unspecified     Red spots, pain    Omeprazole Unspecified     Urinary incontinence     Tape Unspecified     Welts on skin, paper tape is ok       Current Outpatient Medications   Medication Sig Dispense Refill    apixaban (ELIQUIS) 2.5mg Tab Take 1 Tablet by mouth 2 times a day. 200 Tablet 3    flecainide (TAMBOCOR) 100 MG Tab Take 1 Tablet by mouth 2 times a day. 200 Tablet 3    metoprolol SR (TOPROL XL) 100 MG TABLET SR 24 HR Take 1 Tablet by mouth every day. 90 Tablet 3    Evolocumab (REPATHA) 140 MG/ML Solution Auto-injector SubQ injection pen Inject 1 mL under the skin every 14 days. 6 mL 3    clonazePAM (KLONOPIN) 0.5 MG Tab TAKE ONE TABLET BY MOUTH NIGHTLY AT BEDTIME AS NEEDED FOR INSOMNIA FOR 30 DAYS      estrogens, conjugated (PREMARIN) 0.3 MG Tab Take 0.3 mg by mouth every day.       No current facility-administered medications for this visit.       Vitals:    05/06/24 1459   BP: 120/70   Pulse: 66   Resp: 16   SpO2: 95%         Review of Systems   Constitutional: Negative.  Negative for malaise/fatigue.   HENT: Negative.     Eyes: Negative.    Respiratory: Negative.  Negative for shortness of breath and wheezing.    Cardiovascular:  Negative for chest pain, palpitations, orthopnea, claudication, leg swelling and PND.   Gastrointestinal: Negative.  Negative for nausea.   Genitourinary: Negative.    Musculoskeletal: Negative.    Skin: Negative.    Neurological: Negative.  Negative for dizziness and sensory change.   Endo/Heme/Allergies: Negative.  Does not bruise/bleed easily.   Psychiatric/Behavioral:  Negative for depression and hallucinations. The patient is not nervous/anxious.           EKG interpreted by me: Sinus    Physical Exam  Constitutional:       Appearance: Normal appearance.   HENT:      Head: Normocephalic.   Eyes:      Pupils: Pupils are equal, round, and reactive to light.  "  Neck:      Vascular: No JVD.   Cardiovascular:      Rate and Rhythm: Normal rate and regular rhythm.      Pulses: Normal pulses.      Heart sounds: Normal heart sounds.   Pulmonary:      Effort: Pulmonary effort is normal.      Breath sounds: Normal breath sounds.   Abdominal:      General: Abdomen is flat.      Palpations: Abdomen is soft.   Musculoskeletal:      Cervical back: Normal range of motion.      Right lower leg: No edema.      Left lower leg: No edema.   Skin:     General: Skin is warm and dry.   Neurological:      Mental Status: She is alert and oriented to person, place, and time.   Psychiatric:         Mood and Affect: Mood normal.         Behavior: Behavior normal.          Data:  Lipids:   Lab Results   Component Value Date/Time    CHOLSTRLTOT 179 10/07/2020 05:50 AM    TRIGLYCERIDE 87 10/07/2020 05:50 AM    HDL 81 10/07/2020 05:50 AM    LDL 81 10/07/2020 05:50 AM        BMP:  Lab Results   Component Value Date/Time    SODIUM 140 10/04/2023 1659    POTASSIUM 4.6 10/04/2023 1659    CHLORIDE 104 10/04/2023 1659    CO2 27 10/04/2023 1659    GLUCOSE 112 (H) 10/04/2023 1659    BUN 20 10/04/2023 1659    CREATININE 0.81 10/04/2023 1659    CALCIUM 9.2 10/04/2023 1659    ANION 9.0 10/04/2023 1659       GFR:  Lab Results   Component Value Date/Time    IFAFRICA >60 12/13/2021 1152    IFNOTAFR >60 12/13/2021 1152        TSH:   No results found for: \"TSHULTRASEN\"    MAGNESIUM:  No results found for: \"MAGNESIUM\"     THYROXINE (T4):   No results found for: \"FREEDIR\"     CBC:   Lab Results   Component Value Date/Time    WBC 5.0 10/04/2023 04:59 PM    RBC 4.30 10/04/2023 04:59 PM    HEMOGLOBIN 13.4 10/04/2023 04:59 PM    HEMATOCRIT 39.8 10/04/2023 04:59 PM    MCV 92.6 10/04/2023 04:59 PM    MCH 31.2 10/04/2023 04:59 PM    MCHC 33.7 10/04/2023 04:59 PM    RDW 41.3 10/04/2023 04:59 PM    PLATELETCT 269 10/04/2023 04:59 PM    MPV 11.1 10/04/2023 04:59 PM    NEUTSPOLYS 66.80 03/12/2023 04:01 PM    LYMPHOCYTES 24.80 " "03/12/2023 04:01 PM    MONOCYTES 6.10 03/12/2023 04:01 PM    EOSINOPHILS 1.40 03/12/2023 04:01 PM    BASOPHILS 0.70 03/12/2023 04:01 PM    IMMGRAN 0.20 03/12/2023 04:01 PM    NRBC 0.00 03/12/2023 04:01 PM    NEUTS 5.61 03/12/2023 04:01 PM    LYMPHS 2.08 03/12/2023 04:01 PM    MONOS 0.51 03/12/2023 04:01 PM    EOS 0.12 03/12/2023 04:01 PM    BASO 0.06 03/12/2023 04:01 PM    IMMGRANAB 0.02 03/12/2023 04:01 PM    NRBCAB 0.00 03/12/2023 04:01 PM        CBC w/o DIFF  Lab Results   Component Value Date/Time    WBC 5.0 10/04/2023 04:59 PM    RBC 4.30 10/04/2023 04:59 PM    HEMOGLOBIN 13.4 10/04/2023 04:59 PM    MCV 92.6 10/04/2023 04:59 PM    MCH 31.2 10/04/2023 04:59 PM    MCHC 33.7 10/04/2023 04:59 PM    RDW 41.3 10/04/2023 04:59 PM    MPV 11.1 10/04/2023 04:59 PM       LIVER:  Lab Results   Component Value Date/Time    ALKPHOSPHAT 88 10/04/2023 04:59 PM    ASTSGOT 26 10/04/2023 04:59 PM    ALTSGPT 23 10/04/2023 04:59 PM    TBILIRUBIN 0.3 10/04/2023 04:59 PM       BNP:  No results found for: \"BNPBTYPENAT\"    PT/INR:  Lab Results   Component Value Date/Time    PROTHROMBTM 15.5 (H) 08/09/2022 11:58 AM    INR 1.24 (H) 08/09/2022 11:58 AM             Impression/Plan:  History of atrial fibrillation   - Eliquis twice daily for stroke protection   - flecainide for rhythm control   - metoprolol for rate control     Dyslipidemia  Continue Repatha    Encounter for monitoring flecainide therapy   -    - continue current dose 100mg twice daily   - return to clinic every 6 month   - BMP and magnesium labs           A total of 30 minutes of time was spent on day of encounter reviewing medical record, performing history and examination, counseling, ordering medication/test/consults, collaborating with referring service, and documentation.    Anibal Saldivar [facility]-EP  Cardiac Electrophysiology    "

## 2024-05-06 NOTE — ASSESSMENT & PLAN NOTE
-    - continue current dose 100mg twice daily   - return to clinic every 6 month   - BMP and magnesium labs

## 2024-06-18 LAB — EKG IMPRESSION: NORMAL

## 2024-07-27 ENCOUNTER — APPOINTMENT (OUTPATIENT)
Dept: RADIOLOGY | Facility: MEDICAL CENTER | Age: 84
End: 2024-07-27
Attending: EMERGENCY MEDICINE
Payer: MEDICARE

## 2024-07-27 ENCOUNTER — HOSPITAL ENCOUNTER (INPATIENT)
Facility: MEDICAL CENTER | Age: 84
LOS: 3 days | End: 2024-07-30
Attending: EMERGENCY MEDICINE | Admitting: INTERNAL MEDICINE
Payer: MEDICARE

## 2024-07-27 DIAGNOSIS — R57.9 SHOCK (HCC): ICD-10-CM

## 2024-07-27 DIAGNOSIS — K92.2 GASTROINTESTINAL HEMORRHAGE, UNSPECIFIED GASTROINTESTINAL HEMORRHAGE TYPE: ICD-10-CM

## 2024-07-27 DIAGNOSIS — I47.10 SVT (SUPRAVENTRICULAR TACHYCARDIA) (HCC): ICD-10-CM

## 2024-07-27 DIAGNOSIS — K56.7 ILEUS (HCC): ICD-10-CM

## 2024-07-27 DIAGNOSIS — K52.9 PROCTOCOLITIS: ICD-10-CM

## 2024-07-27 PROBLEM — K59.00 CONSTIPATION: Status: ACTIVE | Noted: 2024-07-27

## 2024-07-27 PROBLEM — E87.1 HYPONATREMIA: Status: ACTIVE | Noted: 2024-07-27

## 2024-07-27 PROBLEM — R74.8 ELEVATED LIVER ENZYMES: Status: ACTIVE | Noted: 2024-07-27

## 2024-07-27 PROBLEM — E87.29 HIGH ANION GAP METABOLIC ACIDOSIS: Status: ACTIVE | Noted: 2024-07-27

## 2024-07-27 PROBLEM — R73.9 HYPERGLYCEMIA: Status: ACTIVE | Noted: 2024-07-27

## 2024-07-27 LAB
ABO + RH BLD: NORMAL
ABO GROUP BLD: NORMAL
ALBUMIN SERPL BCP-MCNC: 4 G/DL (ref 3.2–4.9)
ALBUMIN/GLOB SERPL: 1.5 G/DL
ALP SERPL-CCNC: 161 U/L (ref 30–99)
ALT SERPL-CCNC: 112 U/L (ref 2–50)
ANION GAP SERPL CALC-SCNC: 19 MMOL/L (ref 7–16)
APTT PPP: 32.1 SEC (ref 24.7–36)
AST SERPL-CCNC: 100 U/L (ref 12–45)
BASOPHILS # BLD AUTO: 0.3 % (ref 0–1.8)
BASOPHILS # BLD: 0.02 K/UL (ref 0–0.12)
BILIRUB SERPL-MCNC: 0.6 MG/DL (ref 0.1–1.5)
BLD GP AB SCN SERPL QL: NORMAL
BUN SERPL-MCNC: 17 MG/DL (ref 8–22)
CALCIUM ALBUM COR SERPL-MCNC: 9.7 MG/DL (ref 8.5–10.5)
CALCIUM SERPL-MCNC: 9.7 MG/DL (ref 8.4–10.2)
CHLORIDE SERPL-SCNC: 95 MMOL/L (ref 96–112)
CO2 SERPL-SCNC: 16 MMOL/L (ref 20–33)
CREAT SERPL-MCNC: 0.76 MG/DL (ref 0.5–1.4)
EOSINOPHIL # BLD AUTO: 0.03 K/UL (ref 0–0.51)
EOSINOPHIL NFR BLD: 0.4 % (ref 0–6.9)
ERYTHROCYTE [DISTWIDTH] IN BLOOD BY AUTOMATED COUNT: 39.6 FL (ref 35.9–50)
GFR SERPLBLD CREATININE-BSD FMLA CKD-EPI: 77 ML/MIN/1.73 M 2
GLOBULIN SER CALC-MCNC: 2.7 G/DL (ref 1.9–3.5)
GLUCOSE BLD STRIP.AUTO-MCNC: 122 MG/DL (ref 65–99)
GLUCOSE SERPL-MCNC: 124 MG/DL (ref 65–99)
HCT VFR BLD AUTO: 41.3 % (ref 37–47)
HGB BLD-MCNC: 14.5 G/DL (ref 12–16)
IMM GRANULOCYTES # BLD AUTO: 0.02 K/UL (ref 0–0.11)
IMM GRANULOCYTES NFR BLD AUTO: 0.3 % (ref 0–0.9)
INR PPP: 0.98 (ref 0.87–1.13)
LACTATE SERPL-SCNC: 1.4 MMOL/L (ref 0.5–2)
LYMPHOCYTES # BLD AUTO: 1.49 K/UL (ref 1–4.8)
LYMPHOCYTES NFR BLD: 19.3 % (ref 22–41)
MCH RBC QN AUTO: 31.4 PG (ref 27–33)
MCHC RBC AUTO-ENTMCNC: 35.1 G/DL (ref 32.2–35.5)
MCV RBC AUTO: 89.4 FL (ref 81.4–97.8)
MONOCYTES # BLD AUTO: 0.5 K/UL (ref 0–0.85)
MONOCYTES NFR BLD AUTO: 6.5 % (ref 0–13.4)
NEUTROPHILS # BLD AUTO: 5.65 K/UL (ref 1.82–7.42)
NEUTROPHILS NFR BLD: 73.2 % (ref 44–72)
NRBC # BLD AUTO: 0 K/UL
NRBC BLD-RTO: 0 /100 WBC (ref 0–0.2)
PLATELET # BLD AUTO: 304 K/UL (ref 164–446)
PMV BLD AUTO: 9.9 FL (ref 9–12.9)
POTASSIUM SERPL-SCNC: 4.2 MMOL/L (ref 3.6–5.5)
PROT SERPL-MCNC: 6.7 G/DL (ref 6–8.2)
PROTHROMBIN TIME: 13.5 SEC (ref 12–14.6)
RBC # BLD AUTO: 4.62 M/UL (ref 4.2–5.4)
RH BLD: NORMAL
SODIUM SERPL-SCNC: 130 MMOL/L (ref 135–145)
WBC # BLD AUTO: 7.7 K/UL (ref 4.8–10.8)

## 2024-07-27 PROCEDURE — 74177 CT ABD & PELVIS W/CONTRAST: CPT

## 2024-07-27 PROCEDURE — 82962 GLUCOSE BLOOD TEST: CPT

## 2024-07-27 PROCEDURE — 85610 PROTHROMBIN TIME: CPT

## 2024-07-27 PROCEDURE — 85025 COMPLETE CBC W/AUTO DIFF WBC: CPT

## 2024-07-27 PROCEDURE — 96376 TX/PRO/DX INJ SAME DRUG ADON: CPT

## 2024-07-27 PROCEDURE — 36415 COLL VENOUS BLD VENIPUNCTURE: CPT

## 2024-07-27 PROCEDURE — 80053 COMPREHEN METABOLIC PANEL: CPT

## 2024-07-27 PROCEDURE — 770001 HCHG ROOM/CARE - MED/SURG/GYN PRIV*

## 2024-07-27 PROCEDURE — 85730 THROMBOPLASTIN TIME PARTIAL: CPT

## 2024-07-27 PROCEDURE — 86850 RBC ANTIBODY SCREEN: CPT

## 2024-07-27 PROCEDURE — 86900 BLOOD TYPING SEROLOGIC ABO: CPT

## 2024-07-27 PROCEDURE — 94760 N-INVAS EAR/PLS OXIMETRY 1: CPT

## 2024-07-27 PROCEDURE — 83605 ASSAY OF LACTIC ACID: CPT

## 2024-07-27 PROCEDURE — 700111 HCHG RX REV CODE 636 W/ 250 OVERRIDE (IP): Performed by: INTERNAL MEDICINE

## 2024-07-27 PROCEDURE — 86901 BLOOD TYPING SEROLOGIC RH(D): CPT

## 2024-07-27 PROCEDURE — 700117 HCHG RX CONTRAST REV CODE 255: Performed by: EMERGENCY MEDICINE

## 2024-07-27 PROCEDURE — 99285 EMERGENCY DEPT VISIT HI MDM: CPT

## 2024-07-27 PROCEDURE — A9270 NON-COVERED ITEM OR SERVICE: HCPCS | Performed by: INTERNAL MEDICINE

## 2024-07-27 PROCEDURE — 700111 HCHG RX REV CODE 636 W/ 250 OVERRIDE (IP): Mod: JZ | Performed by: EMERGENCY MEDICINE

## 2024-07-27 PROCEDURE — 99223 1ST HOSP IP/OBS HIGH 75: CPT | Mod: AI | Performed by: INTERNAL MEDICINE

## 2024-07-27 PROCEDURE — 700105 HCHG RX REV CODE 258: Performed by: INTERNAL MEDICINE

## 2024-07-27 PROCEDURE — 700105 HCHG RX REV CODE 258: Performed by: EMERGENCY MEDICINE

## 2024-07-27 PROCEDURE — 700102 HCHG RX REV CODE 250 W/ 637 OVERRIDE(OP): Performed by: INTERNAL MEDICINE

## 2024-07-27 PROCEDURE — 96374 THER/PROPH/DIAG INJ IV PUSH: CPT

## 2024-07-27 RX ORDER — HALOPERIDOL 5 MG/ML
1 INJECTION INTRAMUSCULAR
Status: DISCONTINUED | OUTPATIENT
Start: 2024-07-27 | End: 2024-07-30

## 2024-07-27 RX ORDER — LABETALOL HYDROCHLORIDE 5 MG/ML
10 INJECTION, SOLUTION INTRAVENOUS EVERY 4 HOURS PRN
Status: DISCONTINUED | OUTPATIENT
Start: 2024-07-27 | End: 2024-07-30 | Stop reason: HOSPADM

## 2024-07-27 RX ORDER — POLYETHYLENE GLYCOL 3350 17 G/17G
1 POWDER, FOR SOLUTION ORAL
Status: DISCONTINUED | OUTPATIENT
Start: 2024-07-27 | End: 2024-07-28

## 2024-07-27 RX ORDER — BISACODYL 10 MG
10 SUPPOSITORY, RECTAL RECTAL DAILY
Status: DISCONTINUED | OUTPATIENT
Start: 2024-07-28 | End: 2024-07-30 | Stop reason: HOSPADM

## 2024-07-27 RX ORDER — SODIUM CHLORIDE, SODIUM LACTATE, POTASSIUM CHLORIDE, CALCIUM CHLORIDE 600; 310; 30; 20 MG/100ML; MG/100ML; MG/100ML; MG/100ML
1000 INJECTION, SOLUTION INTRAVENOUS ONCE
Status: COMPLETED | OUTPATIENT
Start: 2024-07-27 | End: 2024-07-27

## 2024-07-27 RX ORDER — ONDANSETRON 4 MG/1
4 TABLET, ORALLY DISINTEGRATING ORAL EVERY 4 HOURS PRN
Status: DISCONTINUED | OUTPATIENT
Start: 2024-07-27 | End: 2024-07-29

## 2024-07-27 RX ORDER — DEXTROSE MONOHYDRATE 25 G/50ML
25 INJECTION, SOLUTION INTRAVENOUS
Status: DISCONTINUED | OUTPATIENT
Start: 2024-07-27 | End: 2024-07-30

## 2024-07-27 RX ORDER — MORPHINE SULFATE 4 MG/ML
2 INJECTION INTRAVENOUS ONCE
Status: COMPLETED | OUTPATIENT
Start: 2024-07-27 | End: 2024-07-27

## 2024-07-27 RX ORDER — PANTOPRAZOLE SODIUM 40 MG/10ML
40 INJECTION, POWDER, LYOPHILIZED, FOR SOLUTION INTRAVENOUS 2 TIMES DAILY
Status: DISCONTINUED | OUTPATIENT
Start: 2024-07-27 | End: 2024-07-30 | Stop reason: HOSPADM

## 2024-07-27 RX ORDER — ACETAMINOPHEN 325 MG/1
650 TABLET ORAL EVERY 6 HOURS PRN
Status: DISCONTINUED | OUTPATIENT
Start: 2024-07-27 | End: 2024-07-29

## 2024-07-27 RX ORDER — ONDANSETRON 2 MG/ML
4 INJECTION INTRAMUSCULAR; INTRAVENOUS EVERY 4 HOURS PRN
Status: DISCONTINUED | OUTPATIENT
Start: 2024-07-27 | End: 2024-07-30 | Stop reason: HOSPADM

## 2024-07-27 RX ORDER — AMOXICILLIN 250 MG
2 CAPSULE ORAL EVERY EVENING
Status: DISCONTINUED | OUTPATIENT
Start: 2024-07-27 | End: 2024-07-28

## 2024-07-27 RX ORDER — CLONAZEPAM 0.5 MG/1
0.5 TABLET ORAL
Status: DISCONTINUED | OUTPATIENT
Start: 2024-07-27 | End: 2024-07-29

## 2024-07-27 RX ORDER — METOPROLOL SUCCINATE 100 MG/1
100 TABLET, EXTENDED RELEASE ORAL DAILY
Status: DISCONTINUED | OUTPATIENT
Start: 2024-07-28 | End: 2024-07-30 | Stop reason: HOSPADM

## 2024-07-27 RX ORDER — ACYCLOVIR 400 MG/1
400 TABLET ORAL 3 TIMES DAILY PRN
COMMUNITY

## 2024-07-27 RX ORDER — SODIUM CHLORIDE 9 MG/ML
INJECTION, SOLUTION INTRAVENOUS CONTINUOUS
Status: DISCONTINUED | OUTPATIENT
Start: 2024-07-27 | End: 2024-07-29

## 2024-07-27 RX ORDER — FLECAINIDE ACETATE 100 MG/1
100 TABLET ORAL 2 TIMES DAILY
Status: DISCONTINUED | OUTPATIENT
Start: 2024-07-27 | End: 2024-07-29

## 2024-07-27 RX ADMIN — SODIUM CHLORIDE: 9 INJECTION, SOLUTION INTRAVENOUS at 22:18

## 2024-07-27 RX ADMIN — SODIUM CHLORIDE, POTASSIUM CHLORIDE, SODIUM LACTATE AND CALCIUM CHLORIDE 1000 ML: 600; 310; 30; 20 INJECTION, SOLUTION INTRAVENOUS at 19:36

## 2024-07-27 RX ADMIN — ACETAMINOPHEN 650 MG: 325 TABLET ORAL at 22:52

## 2024-07-27 RX ADMIN — FLECAINIDE ACETATE 100 MG: 100 TABLET ORAL at 22:25

## 2024-07-27 RX ADMIN — PANTOPRAZOLE SODIUM 40 MG: 40 INJECTION, POWDER, FOR SOLUTION INTRAVENOUS at 22:23

## 2024-07-27 RX ADMIN — IOHEXOL 80 ML: 350 INJECTION, SOLUTION INTRAVENOUS at 20:12

## 2024-07-27 RX ADMIN — SENNOSIDES AND DOCUSATE SODIUM 2 TABLET: 50; 8.6 TABLET ORAL at 22:23

## 2024-07-27 RX ADMIN — HALOPERIDOL LACTATE 1 MG: 5 INJECTION, SOLUTION INTRAMUSCULAR at 22:49

## 2024-07-27 RX ADMIN — MORPHINE SULFATE 2 MG: 4 INJECTION, SOLUTION INTRAMUSCULAR; INTRAVENOUS at 21:15

## 2024-07-27 SDOH — ECONOMIC STABILITY: TRANSPORTATION INSECURITY
IN THE PAST 12 MONTHS, HAS LACK OF RELIABLE TRANSPORTATION KEPT YOU FROM MEDICAL APPOINTMENTS, MEETINGS, WORK OR FROM GETTING THINGS NEEDED FOR DAILY LIVING?: NO

## 2024-07-27 SDOH — ECONOMIC STABILITY: TRANSPORTATION INSECURITY
IN THE PAST 12 MONTHS, HAS THE LACK OF TRANSPORTATION KEPT YOU FROM MEDICAL APPOINTMENTS OR FROM GETTING MEDICATIONS?: NO

## 2024-07-27 ASSESSMENT — SOCIAL DETERMINANTS OF HEALTH (SDOH)
WITHIN THE PAST 12 MONTHS, YOU WORRIED THAT YOUR FOOD WOULD RUN OUT BEFORE YOU GOT THE MONEY TO BUY MORE: NEVER TRUE
WITHIN THE PAST 12 MONTHS, THE FOOD YOU BOUGHT JUST DIDN'T LAST AND YOU DIDN'T HAVE MONEY TO GET MORE: NEVER TRUE
WITHIN THE LAST YEAR, HAVE YOU BEEN KICKED, HIT, SLAPPED, OR OTHERWISE PHYSICALLY HURT BY YOUR PARTNER OR EX-PARTNER?: NO
IN THE PAST 12 MONTHS, HAS THE ELECTRIC, GAS, OIL, OR WATER COMPANY THREATENED TO SHUT OFF SERVICE IN YOUR HOME?: NO
WITHIN THE PAST 12 MONTHS, YOU WORRIED THAT YOUR FOOD WOULD RUN OUT BEFORE YOU GOT THE MONEY TO BUY MORE: NEVER TRUE
WITHIN THE LAST YEAR, HAVE YOU BEEN HUMILIATED OR EMOTIONALLY ABUSED IN OTHER WAYS BY YOUR PARTNER OR EX-PARTNER?: NO
WITHIN THE PAST 12 MONTHS, THE FOOD YOU BOUGHT JUST DIDN'T LAST AND YOU DIDN'T HAVE MONEY TO GET MORE: NEVER TRUE
IN THE PAST 12 MONTHS, HAS THE ELECTRIC, GAS, OIL, OR WATER COMPANY THREATENED TO SHUT OFF SERVICE IN YOUR HOME?: NO
WITHIN THE LAST YEAR, HAVE YOU BEEN AFRAID OF YOUR PARTNER OR EX-PARTNER?: NO
WITHIN THE LAST YEAR, HAVE TO BEEN RAPED OR FORCED TO HAVE ANY KIND OF SEXUAL ACTIVITY BY YOUR PARTNER OR EX-PARTNER?: NO

## 2024-07-27 ASSESSMENT — LIFESTYLE VARIABLES
AVERAGE NUMBER OF DAYS PER WEEK YOU HAVE A DRINK CONTAINING ALCOHOL: 0
HAVE YOU EVER FELT YOU SHOULD CUT DOWN ON YOUR DRINKING: NO
EVER FELT BAD OR GUILTY ABOUT YOUR DRINKING: NO
CONSUMPTION TOTAL: NEGATIVE
TOTAL SCORE: 0
EVER HAD A DRINK FIRST THING IN THE MORNING TO STEADY YOUR NERVES TO GET RID OF A HANGOVER: NO
TOTAL SCORE: 0
ON A TYPICAL DAY WHEN YOU DRINK ALCOHOL HOW MANY DRINKS DO YOU HAVE: 0
TOTAL SCORE: 0
ALCOHOL_USE: NO
HAVE PEOPLE ANNOYED YOU BY CRITICIZING YOUR DRINKING: NO
HOW MANY TIMES IN THE PAST YEAR HAVE YOU HAD 5 OR MORE DRINKS IN A DAY: 0

## 2024-07-27 ASSESSMENT — COGNITIVE AND FUNCTIONAL STATUS - GENERAL
DRESSING REGULAR UPPER BODY CLOTHING: A LITTLE
MOVING FROM LYING ON BACK TO SITTING ON SIDE OF FLAT BED: A LITTLE
DAILY ACTIVITIY SCORE: 20
STANDING UP FROM CHAIR USING ARMS: A LITTLE
MOVING TO AND FROM BED TO CHAIR: A LITTLE
HELP NEEDED FOR BATHING: A LITTLE
CLIMB 3 TO 5 STEPS WITH RAILING: A LITTLE
MOBILITY SCORE: 18
DRESSING REGULAR LOWER BODY CLOTHING: A LITTLE
TOILETING: A LITTLE
WALKING IN HOSPITAL ROOM: A LITTLE
SUGGESTED CMS G CODE MODIFIER MOBILITY: CK
TURNING FROM BACK TO SIDE WHILE IN FLAT BAD: A LITTLE
SUGGESTED CMS G CODE MODIFIER DAILY ACTIVITY: CJ

## 2024-07-27 ASSESSMENT — PAIN DESCRIPTION - PAIN TYPE
TYPE: ACUTE PAIN
TYPE: ACUTE PAIN

## 2024-07-27 ASSESSMENT — FIBROSIS 4 INDEX
FIB4 SCORE: 2.61
FIB4 SCORE: 1.69

## 2024-07-27 ASSESSMENT — PATIENT HEALTH QUESTIONNAIRE - PHQ9
SUM OF ALL RESPONSES TO PHQ9 QUESTIONS 1 AND 2: 0
1. LITTLE INTEREST OR PLEASURE IN DOING THINGS: NOT AT ALL
2. FEELING DOWN, DEPRESSED, IRRITABLE, OR HOPELESS: NOT AT ALL

## 2024-07-28 LAB
ALBUMIN SERPL BCP-MCNC: 3.4 G/DL (ref 3.2–4.9)
ALBUMIN/GLOB SERPL: 1.3 G/DL
ALP SERPL-CCNC: 148 U/L (ref 30–99)
ALT SERPL-CCNC: 96 U/L (ref 2–50)
ANION GAP SERPL CALC-SCNC: 20 MMOL/L (ref 7–16)
AST SERPL-CCNC: 94 U/L (ref 12–45)
BILIRUB SERPL-MCNC: 0.5 MG/DL (ref 0.1–1.5)
BUN SERPL-MCNC: 16 MG/DL (ref 8–22)
CALCIUM ALBUM COR SERPL-MCNC: 9.5 MG/DL (ref 8.5–10.5)
CALCIUM SERPL-MCNC: 9 MG/DL (ref 8.4–10.2)
CHLORIDE SERPL-SCNC: 97 MMOL/L (ref 96–112)
CO2 SERPL-SCNC: 13 MMOL/L (ref 20–33)
CREAT SERPL-MCNC: 0.78 MG/DL (ref 0.5–1.4)
ERYTHROCYTE [DISTWIDTH] IN BLOOD BY AUTOMATED COUNT: 42.3 FL (ref 35.9–50)
GFR SERPLBLD CREATININE-BSD FMLA CKD-EPI: 74 ML/MIN/1.73 M 2
GLOBULIN SER CALC-MCNC: 2.7 G/DL (ref 1.9–3.5)
GLUCOSE BLD STRIP.AUTO-MCNC: 106 MG/DL (ref 65–99)
GLUCOSE BLD STRIP.AUTO-MCNC: 125 MG/DL (ref 65–99)
GLUCOSE BLD STRIP.AUTO-MCNC: 126 MG/DL (ref 65–99)
GLUCOSE BLD STRIP.AUTO-MCNC: 128 MG/DL (ref 65–99)
GLUCOSE SERPL-MCNC: 115 MG/DL (ref 65–99)
HCT VFR BLD AUTO: 38.7 % (ref 37–47)
HCT VFR BLD AUTO: 41.9 % (ref 37–47)
HGB BLD-MCNC: 13.5 G/DL (ref 12–16)
HGB BLD-MCNC: 14.1 G/DL (ref 12–16)
LACTATE SERPL-SCNC: 1.2 MMOL/L (ref 0.5–2)
MCH RBC QN AUTO: 31.3 PG (ref 27–33)
MCHC RBC AUTO-ENTMCNC: 33.7 G/DL (ref 32.2–35.5)
MCV RBC AUTO: 93.1 FL (ref 81.4–97.8)
PLATELET # BLD AUTO: 230 K/UL (ref 164–446)
PMV BLD AUTO: 10.2 FL (ref 9–12.9)
POTASSIUM SERPL-SCNC: 4.2 MMOL/L (ref 3.6–5.5)
PROT SERPL-MCNC: 6.1 G/DL (ref 6–8.2)
RBC # BLD AUTO: 4.5 M/UL (ref 4.2–5.4)
SODIUM SERPL-SCNC: 130 MMOL/L (ref 135–145)
WBC # BLD AUTO: 8.9 K/UL (ref 4.8–10.8)

## 2024-07-28 PROCEDURE — 97535 SELF CARE MNGMENT TRAINING: CPT

## 2024-07-28 PROCEDURE — 700101 HCHG RX REV CODE 250: Performed by: INTERNAL MEDICINE

## 2024-07-28 PROCEDURE — 36415 COLL VENOUS BLD VENIPUNCTURE: CPT

## 2024-07-28 PROCEDURE — 85014 HEMATOCRIT: CPT

## 2024-07-28 PROCEDURE — 700102 HCHG RX REV CODE 250 W/ 637 OVERRIDE(OP): Performed by: INTERNAL MEDICINE

## 2024-07-28 PROCEDURE — 85027 COMPLETE CBC AUTOMATED: CPT

## 2024-07-28 PROCEDURE — 97161 PT EVAL LOW COMPLEX 20 MIN: CPT

## 2024-07-28 PROCEDURE — 85018 HEMOGLOBIN: CPT

## 2024-07-28 PROCEDURE — A9270 NON-COVERED ITEM OR SERVICE: HCPCS | Performed by: INTERNAL MEDICINE

## 2024-07-28 PROCEDURE — 700105 HCHG RX REV CODE 258: Performed by: INTERNAL MEDICINE

## 2024-07-28 PROCEDURE — 80053 COMPREHEN METABOLIC PANEL: CPT

## 2024-07-28 PROCEDURE — 99232 SBSQ HOSP IP/OBS MODERATE 35: CPT | Performed by: INTERNAL MEDICINE

## 2024-07-28 PROCEDURE — 83605 ASSAY OF LACTIC ACID: CPT

## 2024-07-28 PROCEDURE — 94760 N-INVAS EAR/PLS OXIMETRY 1: CPT

## 2024-07-28 PROCEDURE — 82962 GLUCOSE BLOOD TEST: CPT

## 2024-07-28 PROCEDURE — 700111 HCHG RX REV CODE 636 W/ 250 OVERRIDE (IP): Performed by: INTERNAL MEDICINE

## 2024-07-28 PROCEDURE — 770001 HCHG ROOM/CARE - MED/SURG/GYN PRIV*

## 2024-07-28 RX ORDER — BENZOCAINE/MENTHOL 6 MG-10 MG
LOZENGE MUCOUS MEMBRANE 2 TIMES DAILY
Status: DISCONTINUED | OUTPATIENT
Start: 2024-07-28 | End: 2024-07-30 | Stop reason: HOSPADM

## 2024-07-28 RX ORDER — AMOXICILLIN 250 MG
2 CAPSULE ORAL EVERY EVENING
Status: DISCONTINUED | OUTPATIENT
Start: 2024-07-28 | End: 2024-07-29

## 2024-07-28 RX ORDER — SODIUM BICARBONATE 650 MG/1
650 TABLET ORAL 3 TIMES DAILY
Status: DISCONTINUED | OUTPATIENT
Start: 2024-07-28 | End: 2024-07-29

## 2024-07-28 RX ORDER — POLYETHYLENE GLYCOL 3350 17 G/17G
1 POWDER, FOR SOLUTION ORAL 4 TIMES DAILY
Status: DISCONTINUED | OUTPATIENT
Start: 2024-07-28 | End: 2024-07-29

## 2024-07-28 RX ORDER — TRAMADOL HYDROCHLORIDE 50 MG/1
50 TABLET ORAL EVERY 6 HOURS PRN
Status: DISCONTINUED | OUTPATIENT
Start: 2024-07-28 | End: 2024-07-29

## 2024-07-28 RX ADMIN — POLYETHYLENE GLYCOL 3350 1 PACKET: 17 POWDER, FOR SOLUTION ORAL at 07:48

## 2024-07-28 RX ADMIN — SODIUM BICARBONATE 650 MG: 650 TABLET ORAL at 21:13

## 2024-07-28 RX ADMIN — TRAMADOL HYDROCHLORIDE 50 MG: 50 TABLET, COATED ORAL at 14:50

## 2024-07-28 RX ADMIN — POLYETHYLENE GLYCOL-3350 AND ELECTROLYTES 1 L: 236; 6.74; 5.86; 2.97; 22.74 POWDER, FOR SOLUTION ORAL at 18:14

## 2024-07-28 RX ADMIN — ACETAMINOPHEN 650 MG: 325 TABLET ORAL at 19:07

## 2024-07-28 RX ADMIN — SODIUM BICARBONATE 650 MG: 650 TABLET ORAL at 14:49

## 2024-07-28 RX ADMIN — TRAMADOL HYDROCHLORIDE 50 MG: 50 TABLET, COATED ORAL at 21:14

## 2024-07-28 RX ADMIN — BISACODYL 10 MG: 10 SUPPOSITORY RECTAL at 07:48

## 2024-07-28 RX ADMIN — POLYETHYLENE GLYCOL-3350 AND ELECTROLYTES 1 L: 236; 6.74; 5.86; 2.97; 22.74 POWDER, FOR SOLUTION ORAL at 11:51

## 2024-07-28 RX ADMIN — PANTOPRAZOLE SODIUM 40 MG: 40 INJECTION, POWDER, FOR SOLUTION INTRAVENOUS at 05:52

## 2024-07-28 RX ADMIN — PANTOPRAZOLE SODIUM 40 MG: 40 INJECTION, POWDER, FOR SOLUTION INTRAVENOUS at 18:07

## 2024-07-28 RX ADMIN — POLYETHYLENE GLYCOL 3350 1 PACKET: 17 POWDER, FOR SOLUTION ORAL at 21:14

## 2024-07-28 RX ADMIN — SODIUM BICARBONATE 650 MG: 650 TABLET ORAL at 07:48

## 2024-07-28 RX ADMIN — CLONAZEPAM 0.5 MG: 0.5 TABLET ORAL at 22:18

## 2024-07-28 RX ADMIN — ACETAMINOPHEN 650 MG: 325 TABLET ORAL at 03:23

## 2024-07-28 RX ADMIN — SODIUM CHLORIDE: 9 INJECTION, SOLUTION INTRAVENOUS at 10:14

## 2024-07-28 RX ADMIN — HYDROCORTISONE: 1 CREAM TOPICAL at 18:13

## 2024-07-28 RX ADMIN — SENNOSIDES AND DOCUSATE SODIUM 2 TABLET: 50; 8.6 TABLET ORAL at 18:06

## 2024-07-28 RX ADMIN — HYDROCORTISONE: 1 CREAM TOPICAL at 13:00

## 2024-07-28 RX ADMIN — SODIUM PHOSPHATE 133 ML: 7; 19 ENEMA RECTAL at 02:52

## 2024-07-28 RX ADMIN — FLECAINIDE ACETATE 100 MG: 100 TABLET ORAL at 18:06

## 2024-07-28 RX ADMIN — SODIUM CHLORIDE: 9 INJECTION, SOLUTION INTRAVENOUS at 21:11

## 2024-07-28 ASSESSMENT — PATIENT HEALTH QUESTIONNAIRE - PHQ9
2. FEELING DOWN, DEPRESSED, IRRITABLE, OR HOPELESS: NOT AT ALL
SUM OF ALL RESPONSES TO PHQ9 QUESTIONS 1 AND 2: 0
1. LITTLE INTEREST OR PLEASURE IN DOING THINGS: NOT AT ALL

## 2024-07-28 ASSESSMENT — COGNITIVE AND FUNCTIONAL STATUS - GENERAL
STANDING UP FROM CHAIR USING ARMS: A LITTLE
MOVING FROM LYING ON BACK TO SITTING ON SIDE OF FLAT BED: A LOT
SUGGESTED CMS G CODE MODIFIER MOBILITY: CK
WALKING IN HOSPITAL ROOM: A LITTLE
MOVING TO AND FROM BED TO CHAIR: A LITTLE
MOBILITY SCORE: 16
TURNING FROM BACK TO SIDE WHILE IN FLAT BAD: A LITTLE
CLIMB 3 TO 5 STEPS WITH RAILING: A LOT

## 2024-07-28 ASSESSMENT — GAIT ASSESSMENTS
DISTANCE (FEET): 20
GAIT LEVEL OF ASSIST: STANDBY ASSIST
ASSISTIVE DEVICE: FRONT WHEEL WALKER
DEVIATION: STEP TO;DECREASED HEEL STRIKE;DECREASED TOE OFF

## 2024-07-28 ASSESSMENT — FIBROSIS 4 INDEX: FIB4 SCORE: 3.5

## 2024-07-28 ASSESSMENT — PAIN DESCRIPTION - PAIN TYPE
TYPE: ACUTE PAIN
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
## 2024-07-28 NOTE — ED TRIAGE NOTES
Pt presents with  from home for soft stool and nausea for past 2 weeks. Reports pain at her rectum d/t frequent Bms. No known fever. Hx of alzheimer's and at baseline.

## 2024-07-28 NOTE — ASSESSMENT & PLAN NOTE
I believe patient likely has hemorrhoids  I discussed w/ nursing stools are brown, has bright blood on toilet paper  Pt is not anemic  Eliquis on hold still  H/H BID today

## 2024-07-28 NOTE — CARE PLAN
Problem: Pain - Standard  Goal: Alleviation of pain or a reduction in pain to the patient’s comfort goal  Outcome: Progressing     Problem: Knowledge Deficit - Standard  Goal: Patient and family/care givers will demonstrate understanding of plan of care, disease process/condition, diagnostic tests and medications  Outcome: Progressing     Problem: Fall Risk  Goal: Patient will remain free from falls  Outcome: Progressing   The patient is Stable - Low risk of patient condition declining or worsening    Shift Goals  Clinical Goals: IV fluids,monitor electrolytes,pain control,reorient,safety,bowel movement  Patient Goals: less pain  Family Goals: feel better    Progress made toward(s) clinical / shift goals:  Reoriented to time and situation.Had some mucoid scant stool this shift.Fall free.    Patient is not progressing towards the following goals:

## 2024-07-28 NOTE — PROGRESS NOTES
Hospital Medicine Daily Progress Note    Date of Service  7/28/2024    Chief Complaint  Gloria Herndon is a 83 y.o. female admitted 7/27/2024 with abd pain    Hospital Course  81 yo presented with nausea and abd pain as well as rectal pain. Reported diarrhea at home and now significant constipation. Pt is unsure when her last bm was    Interval Problem Update  - given enema, very hard stool, no sig output  - nurse to disimpact  -having abd pain/rectal pain due to constipation  -I spoke with GIC (Christine) regarding disimpacting in the OR and she recommended continuing golytely and add oral dulcolax    I have discussed this patient's plan of care and discharge plan at IDT rounds today with Case Management, Nursing, Nursing leadership, and other members of the IDT team.      Code Status  Full Code    Disposition  The patient is not medically cleared for discharge to home or a post-acute facility.  Anticipate discharge to: home with close outpatient follow-up    I have placed the appropriate orders for post-discharge needs.    Review of Systems  Review of Systems   All other systems reviewed and are negative.       Physical Exam  Temp:  [36.2 °C (97.2 °F)-37.1 °C (98.8 °F)] 36.9 °C (98.4 °F)  Pulse:  [78-98] 80  Resp:  [16-20] 20  BP: ()/(57-90) 99/65  SpO2:  [92 %-97 %] 95 %    Physical Exam  General: NAD, resting comfortably  HEENT: PERRLA, EOMI  Cards: RRR, no murmur or gallops, no tenderness of chest wall, JVP nl  Pulm: normal respiratory effort, CTAB, no wheezes or rhonchi  Abdomen: soft, palpable hard stool in lower abd  MSK: normal ROM of upper and lower extremities  Neuro: CN II-XII grossly intact, sensation/strength intact, AAOx3  Psych: Appropriate mood   Fluids    Intake/Output Summary (Last 24 hours) at 7/28/2024 1227  Last data filed at 7/28/2024 1150  Gross per 24 hour   Intake 1050 ml   Output 300 ml   Net 750 ml       Laboratory  Recent Labs     07/27/24  1933 07/28/24  0025 07/28/24  1154    WBC 7.7 8.9  --    RBC 4.62 4.50  --    HEMOGLOBIN 14.5 14.1 13.5   HEMATOCRIT 41.3 41.9 38.7   MCV 89.4 93.1  --    MCH 31.4 31.3  --    MCHC 35.1 33.7  --    RDW 39.6 42.3  --    PLATELETCT 304 230  --    MPV 9.9 10.2  --      Recent Labs     07/27/24 1933 07/28/24  0025   SODIUM 130* 130*   POTASSIUM 4.2 4.2   CHLORIDE 95* 97   CO2 16* 13*   GLUCOSE 124* 115*   BUN 17 16   CREATININE 0.76 0.78   CALCIUM 9.7 9.0     Recent Labs     07/27/24 1933   APTT 32.1   INR 0.98               Imaging  CT-ABDOMEN-PELVIS WITH   Final Result         1.  Changes compatible with constipation with large volume of stool in the rectal vault with rectal wall thickening and adjacent hazy fat stranding, appearance favoring rectal fecal impaction with probable component of proctocolitis.   2.  Intrahepatic and extrahepatic biliary ductal dilatation, commonly associated with postcholecystectomy physiology, consider causes of biliary obstruction with additional workup as clinically appropriate   3.  Fluid-filled prominence of small bowel suggesting ileus and/or enteritis.   4.  Diverticulosis           Assessment/Plan  * Ileus (HCC)- (present on admission)  Assessment & Plan  Significant constipation with a large volume of stool in the rectal vault, likely causing her pain  Large volume of stool likely causing her proctocolitis  Proctocolitis potentially causing bleeding  Suppository, enema not working, needs disimpaction  I have ordered golytely for patient to sip on until she clears her bowels  Avoid narcotics if possible but given sig pain added tramadol prn  Tylenol prn    Elevated liver enzymes- (present on admission)  Assessment & Plan  Mild, repeat CMP in the morning    Hyperglycemia- (present on admission)  Assessment & Plan  Mild however we will start insulin sliding scale to closely monitor for potential worsening    Hyponatremia- (present on admission)  Assessment & Plan  Due to dehydration  Start IV fluids  Repeat BMP in  the morning    High anion gap metabolic acidosis- (present on admission)  Assessment & Plan  Likely due to decreased oral intake due to constipation  Patient will unlikely be taking in a significant amount orally  IVFs for today  Bicarb started    Acute GI bleeding- (present on admission)  Assessment & Plan  I believe patient likely has hemorrhoids  I discussed w/ nursing stools are brown, has bright blood on toilet paper  Pt is not anemic  Eliquis on hold still  H/H BID today    Mild late onset Alzheimer's dementia without behavioral disturbance, psychotic disturbance, mood disturbance, or anxiety (HCC)- (present on admission)  Assessment & Plan  Likely at baseline  Continue home as needed nightly clonazepam  If she develops significant agitation, will trial as needed Haldol   is concerned about her trying to get up, fall precautions in place    History of atrial fibrillation- (present on admission)  Assessment & Plan  Patient appears in sinus  Continue home flecainide and metoprolol  Hold home Eliquis         VTE prophylaxis: SCDs    I have performed a physical exam and reviewed and updated ROS and Plan today (7/28/2024). In review of yesterday's note (7/27/2024), there are no changes except as documented above.

## 2024-07-28 NOTE — PROGRESS NOTES
Telemetry Shift Summary     Rhythm SR 1st degree  HR Range 71-80  Ectopy rPVC/PAC  Measurements 0.22/0.08/0.38           Normal Values  Rhythm SR  HR Range    Measurements 0.12-0.20 / 0.06-0.10  / 0.30-0.52

## 2024-07-28 NOTE — ASSESSMENT & PLAN NOTE
Likely due to decreased oral intake due to constipation  Patient will unlikely be taking in a significant amount orally  IVFs for today  Bicarb started

## 2024-07-28 NOTE — PROGRESS NOTES
BSSR received from night RN. Pt resting comfortably in bed not in any distress on RA 92%. AAOx2, oriented to person and place. Reported pain, resting for relief. Denies nausea. Remote cardiac monitor in place.  at bedside. Discussed POC. Fall risk precautions in place, locked bed in lowest position, bed and strip alarm on and call light within reach. All needs met at this time. Hourly rounding in place.     Attempted to disimpact pt per MD order, too painful per pt and bloody. Notified MD Simms, another 1L of Golytely ordered and will continue giving it per MD.     Second 1L Golytely ordered placed at bedside with labels and handed off to night RN, will start and continue to take it.

## 2024-07-28 NOTE — PROGRESS NOTES
4 Eyes Skin Assessment Completed by Keith RN and XAVIER Suárez.    Head WDL  Ears WDL  Nose WDL  Mouth WDL  Neck WDL  Breast/Chest WDL  Shoulder Blades WDL  Spine Redness and Blanching  (R) Arm/Elbow/Hand Redness and Blanching Elbow  (L) Arm/Elbow/Hand Redness,blanching elbow  Abdomen WDL  Groin WDL  Scrotum/Coccyx/Buttocks Redness and Blanching  (R) Leg WDL  (L) Leg WDL  (R) Heel/Foot/Toe WDL  (L) Heel/Foot/Toe WDL          Devices In Places Tele Box      Interventions In Place Waffle Overlay    Possible Skin Injury No    Pictures Uploaded Into Epic N/A  Wound Consult Placed N/A  RN Wound Prevention Protocol Ordered No

## 2024-07-28 NOTE — ASSESSMENT & PLAN NOTE
Likely at baseline  Continue home as needed nightly clonazepam  If she develops significant agitation, will trial as needed Corwindol   is concerned about her trying to get up, fall precautions in place

## 2024-07-28 NOTE — ED NOTES
Medication history reviewed with patient and her son. Med rec is complete.     Allergies reviewed.     Patient reports being on a 4 day courts of antibiotics 7/9-13 but is not sure what the antibiotic was.    Anticoagulants (rivaroxaban, apixaban, edoxaban, dabigatran, enoxaparin) taken in the last 14 days? Yes   Anticoagulant: apixaban 2.5mg, Last dose: 7/25/24.     José Manuel Muñoz PharmD, BCPS

## 2024-07-28 NOTE — ED PROVIDER NOTES
ED Provider Note    CHIEF COMPLAINT  Chief Complaint   Patient presents with    Diarrhea    Nausea       EXTERNAL RECORDS REVIEWED  Outpatient Notes outpatient cardiology visit 5/6/2024 with noted history of TIA and A-fib    HPI/ROS  LIMITATION TO HISTORY   Select: Altered mental status / Confusion  OUTSIDE HISTORIAN(S):  Significant other  providing entirety of history    Gloria Herndon is a 83 y.o. female who presents to the emergency department with perirectal pain, persistent nausea and constipation with occasional small loose stool output noted to be potentially bloody by  which has been cleaning her.  Patient with a history of dementia.   primary care provider.  Says that she has not eaten or drinking or taking any of her medications to include Eliquis for the last 48 hours due to feeling unwell.  She is on the Eliquis secondary to history of A-fib.    Limited history of patient given current clinical condition and chronic medical patient presents diseases    PAST MEDICAL HISTORY   has a past medical history of Arrhythmia, Asthma, Atrial fibrillation (HCC), High cholesterol, History of cardiac radiofrequency ablation (12/01/2017), and TIA (transient ischemic attack) (10/2020).    SURGICAL HISTORY   has a past surgical history that includes other cardiac surgery; cholecystectomy (08/08/2003); and other (Bilateral).    FAMILY HISTORY  History reviewed. No pertinent family history.    SOCIAL HISTORY  Social History     Tobacco Use    Smoking status: Never    Smokeless tobacco: Never   Vaping Use    Vaping status: Never Used   Substance and Sexual Activity    Alcohol use: Not Currently     Comment: very rare glass of wine    Drug use: Not Currently    Sexual activity: Not on file       CURRENT MEDICATIONS  Home Medications       Reviewed by Rob Muñoz, SymoneD (Pharmacist) on 07/27/24 at 2103  Med List Status: Complete     Medication Last Dose Status   acyclovir (ZOVIRAX) 400 MG  "tablet  Active   apixaban (ELIQUIS) 2.5mg Tab 7/25/2024 Active   clonazePAM (KLONOPIN) 0.5 MG Tab 7/25/2024 Active   estrogens, conjugated (PREMARIN) 0.3 MG Tab 7/25/2024 Active   Evolocumab (REPATHA) 140 MG/ML Solution Auto-injector SubQ injection pen 7/21/2024 Active   flecainide (TAMBOCOR) 100 MG Tab 7/25/2024 Active   metoprolol SR (TOPROL XL) 100 MG TABLET SR 24 HR 7/25/2024 Active                  Audit from Redirected Encounters    **Home medications have not yet been reviewed for this encounter**         ALLERGIES  Allergies   Allergen Reactions    Blue Dyes Vomiting    Green Dyes Vomiting    Iodine Hives    Iodine Solution [Povidone Iodine] Unspecified     Red spots, pain    Omeprazole Unspecified     Urinary incontinence     Tape Unspecified     Welts on skin, paper tape is ok       PHYSICAL EXAM  VITAL SIGNS: /65   Pulse 90   Temp 37.1 °C (98.8 °F) (Temporal)   Resp 18   Ht 1.702 m (5' 7\")   Wt 63.2 kg (139 lb 5.3 oz)   LMP  (LMP Unknown)   SpO2 96%   BMI 21.82 kg/m²          Pulse ox interpretation: I interpret this pulse ox as normal.  Constitutional: Alert in no apparent distress.  Appears fatigued  HENT: No signs of trauma, Bilateral external ears normal, Nose normal.   Eyes: Pupils are equal and reactive  Neck: Normal range of motion, No tenderness, Supple, No stridor.   Lymphatic: No lymphadenopathy noted.   Cardiovascular: Regular rate and rhythm, no murmurs.   Thorax & Lungs: Normal breath sounds, No respiratory distress  Abdomen: Bowel sounds normal, Soft, No tenderness  : Dark diarrhea on perirectal tissue.  No bright red blood.  Guaiac positive.  Skin: Warm, Dry, No erythema, No rash.   Musculoskeletal: Good range of motion in all major joints. No tenderness to palpation or major deformities noted.   Neurologic: Alert , Normal motor function, Normal sensory function, No focal deficits noted.   Psychiatric: Affect normal, Judgment normal, Mood normal.         EKG/LABS  Results " for orders placed or performed during the hospital encounter of 07/27/24   CBC WITH DIFFERENTIAL   Result Value Ref Range    WBC 7.7 4.8 - 10.8 K/uL    RBC 4.62 4.20 - 5.40 M/uL    Hemoglobin 14.5 12.0 - 16.0 g/dL    Hematocrit 41.3 37.0 - 47.0 %    MCV 89.4 81.4 - 97.8 fL    MCH 31.4 27.0 - 33.0 pg    MCHC 35.1 32.2 - 35.5 g/dL    RDW 39.6 35.9 - 50.0 fL    Platelet Count 304 164 - 446 K/uL    MPV 9.9 9.0 - 12.9 fL    Neutrophils-Polys 73.20 (H) 44.00 - 72.00 %    Lymphocytes 19.30 (L) 22.00 - 41.00 %    Monocytes 6.50 0.00 - 13.40 %    Eosinophils 0.40 0.00 - 6.90 %    Basophils 0.30 0.00 - 1.80 %    Immature Granulocytes 0.30 0.00 - 0.90 %    Nucleated RBC 0.00 0.00 - 0.20 /100 WBC    Neutrophils (Absolute) 5.65 1.82 - 7.42 K/uL    Lymphs (Absolute) 1.49 1.00 - 4.80 K/uL    Monos (Absolute) 0.50 0.00 - 0.85 K/uL    Eos (Absolute) 0.03 0.00 - 0.51 K/uL    Baso (Absolute) 0.02 0.00 - 0.12 K/uL    Immature Granulocytes (abs) 0.02 0.00 - 0.11 K/uL    NRBC (Absolute) 0.00 K/uL   COMP METABOLIC PANEL   Result Value Ref Range    Sodium 130 (L) 135 - 145 mmol/L    Potassium 4.2 3.6 - 5.5 mmol/L    Chloride 95 (L) 96 - 112 mmol/L    Co2 16 (L) 20 - 33 mmol/L    Anion Gap 19.0 (H) 7.0 - 16.0    Glucose 124 (H) 65 - 99 mg/dL    Bun 17 8 - 22 mg/dL    Creatinine 0.76 0.50 - 1.40 mg/dL    Calcium 9.7 8.4 - 10.2 mg/dL    Correct Calcium 9.7 8.5 - 10.5 mg/dL    AST(SGOT) 100 (H) 12 - 45 U/L    ALT(SGPT) 112 (H) 2 - 50 U/L    Alkaline Phosphatase 161 (H) 30 - 99 U/L    Total Bilirubin 0.6 0.1 - 1.5 mg/dL    Albumin 4.0 3.2 - 4.9 g/dL    Total Protein 6.7 6.0 - 8.2 g/dL    Globulin 2.7 1.9 - 3.5 g/dL    A-G Ratio 1.5 g/dL   COD (ADULT)   Result Value Ref Range    ABO Grouping Only O     Rh Grouping Only NEG     Antibody Screen-Cod NEG    PROTHROMBIN TIME (INR)   Result Value Ref Range    PT 13.5 12.0 - 14.6 sec    INR 0.98 0.87 - 1.13   APTT   Result Value Ref Range    APTT 32.1 24.7 - 36.0 sec   LACTIC ACID   Result Value Ref  Range    Lactic Acid 1.4 0.5 - 2.0 mmol/L   ESTIMATED GFR   Result Value Ref Range    GFR (CKD-EPI) 77 >60 mL/min/1.73 m 2   ABO Rh Confirm   Result Value Ref Range    ABO Rh Confirm O NEG    CBC without Differential   Result Value Ref Range    WBC 8.9 4.8 - 10.8 K/uL    RBC 4.50 4.20 - 5.40 M/uL    Hemoglobin 14.1 12.0 - 16.0 g/dL    Hematocrit 41.9 37.0 - 47.0 %    MCV 93.1 81.4 - 97.8 fL    MCH 31.3 27.0 - 33.0 pg    MCHC 33.7 32.2 - 35.5 g/dL    RDW 42.3 35.9 - 50.0 fL    Platelet Count 230 164 - 446 K/uL    MPV 10.2 9.0 - 12.9 fL   Comp Metabolic Panel   Result Value Ref Range    Sodium 130 (L) 135 - 145 mmol/L    Potassium 4.2 3.6 - 5.5 mmol/L    Chloride 97 96 - 112 mmol/L    Co2 13 (L) 20 - 33 mmol/L    Anion Gap 20.0 (H) 7.0 - 16.0    Glucose 115 (H) 65 - 99 mg/dL    Bun 16 8 - 22 mg/dL    Creatinine 0.78 0.50 - 1.40 mg/dL    Calcium 9.0 8.4 - 10.2 mg/dL    Correct Calcium 9.5 8.5 - 10.5 mg/dL    AST(SGOT) 94 (H) 12 - 45 U/L    ALT(SGPT) 96 (H) 2 - 50 U/L    Alkaline Phosphatase 148 (H) 30 - 99 U/L    Total Bilirubin 0.5 0.1 - 1.5 mg/dL    Albumin 3.4 3.2 - 4.9 g/dL    Total Protein 6.1 6.0 - 8.2 g/dL    Globulin 2.7 1.9 - 3.5 g/dL    A-G Ratio 1.3 g/dL   LACTIC ACID   Result Value Ref Range    Lactic Acid 1.2 0.5 - 2.0 mmol/L   ESTIMATED GFR   Result Value Ref Range    GFR (CKD-EPI) 74 >60 mL/min/1.73 m 2   HEMOGLOBIN AND HEMATOCRIT   Result Value Ref Range    Hemoglobin 13.5 12.0 - 16.0 g/dL    Hematocrit 38.7 37.0 - 47.0 %   POCT glucose device results   Result Value Ref Range    POC Glucose, Blood 122 (H) 65 - 99 mg/dL   POCT glucose device results   Result Value Ref Range    POC Glucose, Blood 126 (H) 65 - 99 mg/dL   POCT glucose device results   Result Value Ref Range    POC Glucose, Blood 125 (H) 65 - 99 mg/dL           RADIOLOGY/PROCEDURES   I have independently interpreted the diagnostic imaging associated with this visit and am waiting the final reading from the radiologist.   My preliminary  interpretation is as follows: No obstruction    Radiologist interpretation:  CT-ABDOMEN-PELVIS WITH   Final Result         1.  Changes compatible with constipation with large volume of stool in the rectal vault with rectal wall thickening and adjacent hazy fat stranding, appearance favoring rectal fecal impaction with probable component of proctocolitis.   2.  Intrahepatic and extrahepatic biliary ductal dilatation, commonly associated with postcholecystectomy physiology, consider causes of biliary obstruction with additional workup as clinically appropriate   3.  Fluid-filled prominence of small bowel suggesting ileus and/or enteritis.   4.  Diverticulosis          COURSE & MEDICAL DECISION MAKING    ASSESSMENT, COURSE AND PLAN  Care Narrative: 83-year-old female presented to the emerged from with above presentation.  Will get labs and imaging.  Being she is quite positive will likely need admission for GI bleed.  While she is not currently on a blood thinner for 48 hours she is chronically on Eliquis which will need to be managed    DISPOSITION AND DISCUSSIONS  I have discussed management of the patient with the following physicians and DARREN's: Hospitalist Dr. Hines    83-year-old female presenting with above presentation.  Workup as above.  Hemodynamically stable.  No significant decreased H&H at this time.  Again she has been off of her Eliquis for the last 48 hours.  CT imaging showing ileus and constipation with likely proctocolitis.  This is likely the etiology of her current rectal pain.  At this point I will have the patient come into the hospital for ongoing inpatient care and workup.  Dr. Hines agreeable to this plan at this point.    FINAL DIAGNOSIS  1. Gastrointestinal hemorrhage, unspecified gastrointestinal hemorrhage type    2. Ileus (HCC)    3. Proctocolitis           Electronically signed by: Aleksandr Christie M.D., 7/27/2024 7:13 PM

## 2024-07-28 NOTE — ASSESSMENT & PLAN NOTE
Intensivist discussed the case with gastroenterology who did a disimpaction in the OR  Patient had received significant GoLytely  Certainly possible that patient has ongoing bowel movements leading to vagus nerve stimulation

## 2024-07-28 NOTE — H&P
Hospital Medicine History & Physical Note    Date of Service  7/27/2024    Primary Care Physician  Jimy Lange M.D.    Consultants  None    Specialist Names: None    Code Status  Full Code    Chief Complaint  Chief Complaint   Patient presents with    Diarrhea    Nausea       History of Presenting Illness  Gloria Herndon is a 83 y.o. female who presented 7/27/2024 with dark tarry stool.  Patient does have a history of dementia, family has been cleaning her, noted dark tarry stool so she was brought to the emergency department.  She has also been complaining of perirectal pain with refusal to eat and persistent nausea.  Upon arrival, patient had a CT scan which showed significant constipation and possible ileus.  She also had a Hemoccult positive sample.  Patient is generally on Eliquis due to atrial fibrillation however has refused it for the past 2 days.  Patient at this point in time just complains of pain, unable to get additional information from her, information obtained from family at bedside.  I did discuss the case including labs and imaging with the ER physician.    I discussed the plan of care with bedside RN.    Review of Systems  Review of Systems   Unable to perform ROS: Dementia       Past Medical History   has a past medical history of Arrhythmia, Asthma, Atrial fibrillation (HCC), High cholesterol, History of cardiac radiofrequency ablation (12/01/2017), and TIA (transient ischemic attack) (10/2020).    Surgical History   has a past surgical history that includes other cardiac surgery; cholecystectomy (08/08/2003); and other (Bilateral).     Family History  family history is not on file.   Family history reviewed with patient. There is no family history that is pertinent to the chief complaint.     Social History   reports that she has never smoked. She has never used smokeless tobacco. She reports that she does not currently use alcohol. She reports that she does not currently use  drugs.    Allergies  Allergies   Allergen Reactions    Blue Dyes Vomiting    Green Dyes Vomiting    Iodine Hives    Iodine Solution [Povidone Iodine] Unspecified     Red spots, pain    Omeprazole Unspecified     Urinary incontinence     Tape Unspecified     Welts on skin, paper tape is ok       Medications  Prior to Admission Medications   Prescriptions Last Dose Informant Patient Reported? Taking?   Evolocumab (REPATHA) 140 MG/ML Solution Auto-injector SubQ injection pen 7/21/2024  No No   Sig: Inject 1 mL under the skin every 14 days.   acyclovir (ZOVIRAX) 400 MG tablet  at PRN  Yes Yes   Sig: Take 400 mg by mouth 3 times a day as needed (Outbreak). Take for 5 days at first sign of cold sore.   apixaban (ELIQUIS) 2.5mg Tab 7/25/2024 at PM  No No   Sig: Take 1 Tablet by mouth 2 times a day.   clonazePAM (KLONOPIN) 0.5 MG Tab 7/25/2024 at PM Patient Yes No   Sig: TAKE ONE TABLET BY MOUTH NIGHTLY AT BEDTIME AS NEEDED FOR INSOMNIA FOR 30 DAYS   estrogens, conjugated (PREMARIN) 0.3 MG Tab 7/25/2024 at AM Patient Yes No   Sig: Take 0.3 mg by mouth every day.   flecainide (TAMBOCOR) 100 MG Tab 7/25/2024 at PM  No No   Sig: Take 1 Tablet by mouth 2 times a day.   metoprolol SR (TOPROL XL) 100 MG TABLET SR 24 HR 7/25/2024 at AM  No No   Sig: Take 1 Tablet by mouth every day.      Facility-Administered Medications: None       Physical Exam  Temp:  [36.8 °C (98.2 °F)] 36.8 °C (98.2 °F)  Pulse:  [98] 98  Resp:  [18] 18  BP: (107)/(64) 107/64  SpO2:  [95 %] 95 %  Blood Pressure : 107/64   Temperature: 36.8 °C (98.2 °F)   Pulse: 98   Respiration: 18   Pulse Oximetry: 95 %       Physical Exam  Vitals and nursing note reviewed.   Constitutional:       General: She is not in acute distress.     Appearance: She is well-developed. She is not diaphoretic.   HENT:      Head: Normocephalic and atraumatic.      Right Ear: External ear normal.      Left Ear: External ear normal.      Nose: Nose normal. No congestion or rhinorrhea.       "Mouth/Throat:      Mouth: Mucous membranes are dry.      Pharynx: No oropharyngeal exudate.   Eyes:      General:         Right eye: No discharge.         Left eye: No discharge.   Neck:      Trachea: No tracheal deviation.   Cardiovascular:      Rate and Rhythm: Normal rate and regular rhythm.      Heart sounds: No murmur heard.     No friction rub. No gallop.   Pulmonary:      Effort: Pulmonary effort is normal. No respiratory distress.      Breath sounds: Normal breath sounds. No stridor. No wheezing or rales.   Abdominal:      General: Bowel sounds are decreased. There is no distension.      Tenderness: There is abdominal tenderness in the periumbilical area and suprapubic area.   Musculoskeletal:      Cervical back: Neck supple.      Right lower leg: No edema.      Left lower leg: No edema.   Lymphadenopathy:      Cervical: No cervical adenopathy.   Skin:     General: Skin is warm and dry.      Findings: No erythema or rash.   Neurological:      Comments: Awake, confused   Psychiatric:         Cognition and Memory: Cognition is impaired. Memory is impaired.         Laboratory:  Recent Labs     07/27/24 1933   WBC 7.7   RBC 4.62   HEMOGLOBIN 14.5   HEMATOCRIT 41.3   MCV 89.4   MCH 31.4   MCHC 35.1   RDW 39.6   PLATELETCT 304   MPV 9.9     Recent Labs     07/27/24 1933   SODIUM 130*   POTASSIUM 4.2   CHLORIDE 95*   CO2 16*   GLUCOSE 124*   BUN 17   CREATININE 0.76   CALCIUM 9.7     Recent Labs     07/27/24 1933   ALTSGPT 112*   ASTSGOT 100*   ALKPHOSPHAT 161*   TBILIRUBIN 0.6   GLUCOSE 124*     Recent Labs     07/27/24 1933   APTT 32.1   INR 0.98     No results for input(s): \"NTPROBNP\" in the last 72 hours.      No results for input(s): \"TROPONINT\" in the last 72 hours.    Imaging:  CT-ABDOMEN-PELVIS WITH   Final Result         1.  Changes compatible with constipation with large volume of stool in the rectal vault with rectal wall thickening and adjacent hazy fat stranding, appearance favoring rectal fecal " impaction with probable component of proctocolitis.   2.  Intrahepatic and extrahepatic biliary ductal dilatation, commonly associated with postcholecystectomy physiology, consider causes of biliary obstruction with additional workup as clinically appropriate   3.  Fluid-filled prominence of small bowel suggesting ileus and/or enteritis.   4.  Diverticulosis          no X-Ray or EKG requiring interpretation    Assessment/Plan:  Justification for Admission Status  I anticipate this patient is appropriate for observation status at this time because acute GI bleeding    Patient will need a Med/Surg bed on MEDICAL service .  The need is secondary to acute GI bleeding.    * Ileus (HCC)- (present on admission)  Assessment & Plan  Significant constipation with a large volume of stool in the rectal vault, likely causing her pain  Large volume of stool likely causing her proctocolitis  Proctocolitis potentially causing bleeding  Keep patient n.p.o. due to high concern for ileus especially considering her decreased bowel sounds  Patient is refusing oral medication, will give a enema this evening and start suppository in the morning  While patient is having significant pain, will avoid narcotics unless absolutely necessary due to high likelihood of worsening her constipation  Will use as needed Haldol for agitation    Acute GI bleeding- (present on admission)  Assessment & Plan  Patient was noted to have dark tarry stool at home  Also had Hemoccult positive stool sample  Currently no anemia however the patient is dehydrated  Closely monitor hemoglobins  N.p.o. due to potential ileus as well as possible need for intervention  Presumed upper GI bleed, start Protonix  Associated with significant constipation/ileus  Monitor patient on telemetry    High anion gap metabolic acidosis- (present on admission)  Assessment & Plan  Likely due to decreased oral intake due to constipation  Patient will unlikely be taking in a significant  amount orally  Start IV fluids  Repeat BMP in the morning  While her glucose is mildly elevated, this is not early DKA    Elevated liver enzymes- (present on admission)  Assessment & Plan  Mild, repeat CMP in the morning    Hyperglycemia- (present on admission)  Assessment & Plan  Mild however we will start insulin sliding scale to closely monitor for potential worsening    Hyponatremia- (present on admission)  Assessment & Plan  Due to dehydration  Start IV fluids  Repeat BMP in the morning    Mild late onset Alzheimer's dementia without behavioral disturbance, psychotic disturbance, mood disturbance, or anxiety (HCC)- (present on admission)  Assessment & Plan  Likely at baseline  Continue home as needed nightly clonazepam  If she develops significant agitation, will trial as needed Haldol   is concerned about her trying to get up, fall precautions in place    History of atrial fibrillation- (present on admission)  Assessment & Plan  Patient appears in sinus  Continue home flecainide and metoprolol  Hold home Eliquis        VTE prophylaxis: SCDs/TEDs

## 2024-07-29 ENCOUNTER — APPOINTMENT (OUTPATIENT)
Dept: RADIOLOGY | Facility: MEDICAL CENTER | Age: 84
End: 2024-07-29
Attending: STUDENT IN AN ORGANIZED HEALTH CARE EDUCATION/TRAINING PROGRAM
Payer: MEDICARE

## 2024-07-29 ENCOUNTER — APPOINTMENT (OUTPATIENT)
Dept: CARDIOLOGY | Facility: MEDICAL CENTER | Age: 84
End: 2024-07-29
Attending: STUDENT IN AN ORGANIZED HEALTH CARE EDUCATION/TRAINING PROGRAM
Payer: MEDICARE

## 2024-07-29 PROBLEM — I47.10 SVT (SUPRAVENTRICULAR TACHYCARDIA) (HCC): Status: ACTIVE | Noted: 2024-07-29

## 2024-07-29 LAB
ALBUMIN SERPL BCP-MCNC: 2.8 G/DL (ref 3.2–4.9)
ALBUMIN SERPL BCP-MCNC: 3.4 G/DL (ref 3.2–4.9)
ALBUMIN/GLOB SERPL: 1.3 G/DL
ALBUMIN/GLOB SERPL: 1.4 G/DL
ALP SERPL-CCNC: 111 U/L (ref 30–99)
ALP SERPL-CCNC: 132 U/L (ref 30–99)
ALT SERPL-CCNC: 54 U/L (ref 2–50)
ALT SERPL-CCNC: 72 U/L (ref 2–50)
ANION GAP SERPL CALC-SCNC: 13 MMOL/L (ref 7–16)
ANION GAP SERPL CALC-SCNC: 20 MMOL/L (ref 7–16)
ANION GAP SERPL CALC-SCNC: 20 MMOL/L (ref 7–16)
ARTERIAL PATENCY WRIST A: ABNORMAL
AST SERPL-CCNC: 39 U/L (ref 12–45)
AST SERPL-CCNC: 63 U/L (ref 12–45)
BASE EXCESS BLDA CALC-SCNC: -11 MMOL/L (ref -4–3)
BILIRUB SERPL-MCNC: 0.4 MG/DL (ref 0.1–1.5)
BILIRUB SERPL-MCNC: 0.5 MG/DL (ref 0.1–1.5)
BODY TEMPERATURE: ABNORMAL DEGREES
BUN SERPL-MCNC: 11 MG/DL (ref 8–22)
BUN SERPL-MCNC: 13 MG/DL (ref 8–22)
BUN SERPL-MCNC: 13 MG/DL (ref 8–22)
CALCIUM ALBUM COR SERPL-MCNC: 9.2 MG/DL (ref 8.5–10.5)
CALCIUM ALBUM COR SERPL-MCNC: 9.4 MG/DL (ref 8.5–10.5)
CALCIUM SERPL-MCNC: 8.2 MG/DL (ref 8.4–10.2)
CALCIUM SERPL-MCNC: 8.9 MG/DL (ref 8.4–10.2)
CALCIUM SERPL-MCNC: 8.9 MG/DL (ref 8.4–10.2)
CHLORIDE SERPL-SCNC: 100 MMOL/L (ref 96–112)
CHLORIDE SERPL-SCNC: 101 MMOL/L (ref 96–112)
CHLORIDE SERPL-SCNC: 101 MMOL/L (ref 96–112)
CO2 BLDA-SCNC: 13 MMOL/L (ref 20–33)
CO2 SERPL-SCNC: 16 MMOL/L (ref 20–33)
CO2 SERPL-SCNC: 16 MMOL/L (ref 20–33)
CO2 SERPL-SCNC: 22 MMOL/L (ref 20–33)
CREAT SERPL-MCNC: 0.85 MG/DL (ref 0.5–1.4)
CREAT SERPL-MCNC: 0.98 MG/DL (ref 0.5–1.4)
CREAT SERPL-MCNC: 0.98 MG/DL (ref 0.5–1.4)
DELSYS IDSYS: ABNORMAL
EKG IMPRESSION: NORMAL
EKG IMPRESSION: NORMAL
ERYTHROCYTE [DISTWIDTH] IN BLOOD BY AUTOMATED COUNT: 41.7 FL (ref 35.9–50)
ERYTHROCYTE [DISTWIDTH] IN BLOOD BY AUTOMATED COUNT: 42.2 FL (ref 35.9–50)
GFR SERPLBLD CREATININE-BSD FMLA CKD-EPI: 57 ML/MIN/1.73 M 2
GFR SERPLBLD CREATININE-BSD FMLA CKD-EPI: 67 ML/MIN/1.73 M 2
GLOBULIN SER CALC-MCNC: 2.1 G/DL (ref 1.9–3.5)
GLOBULIN SER CALC-MCNC: 2.4 G/DL (ref 1.9–3.5)
GLUCOSE BLD STRIP.AUTO-MCNC: 136 MG/DL (ref 65–99)
GLUCOSE BLD STRIP.AUTO-MCNC: 138 MG/DL (ref 65–99)
GLUCOSE SERPL-MCNC: 109 MG/DL (ref 65–99)
GLUCOSE SERPL-MCNC: 109 MG/DL (ref 65–99)
GLUCOSE SERPL-MCNC: 168 MG/DL (ref 65–99)
HCO3 BLDA-SCNC: 12.7 MMOL/L (ref 17–25)
HCT VFR BLD AUTO: 32 % (ref 37–47)
HCT VFR BLD AUTO: 36.8 % (ref 37–47)
HGB BLD-MCNC: 11.3 G/DL (ref 12–16)
HGB BLD-MCNC: 12.6 G/DL (ref 12–16)
HOROWITZ INDEX BLDA+IHG-RTO: 70 MM[HG]
LACTATE BLD-SCNC: 0.6 MMOL/L (ref 0.5–2)
LACTATE SERPL-SCNC: 1.4 MMOL/L (ref 0.5–2)
LPM ILPM: 15 LPM
LV EJECT FRACT  99904: 65
LV EJECT FRACT MOD 2C 99903: 66.54
LV EJECT FRACT MOD 4C 99902: 67.42
LV EJECT FRACT MOD BP 99901: 66.59
MAGNESIUM SERPL-MCNC: 1.6 MG/DL (ref 1.5–2.5)
MAGNESIUM SERPL-MCNC: 1.9 MG/DL (ref 1.5–2.5)
MCH RBC QN AUTO: 31 PG (ref 27–33)
MCH RBC QN AUTO: 31.7 PG (ref 27–33)
MCHC RBC AUTO-ENTMCNC: 34.2 G/DL (ref 32.2–35.5)
MCHC RBC AUTO-ENTMCNC: 35.3 G/DL (ref 32.2–35.5)
MCV RBC AUTO: 89.6 FL (ref 81.4–97.8)
MCV RBC AUTO: 90.6 FL (ref 81.4–97.8)
NT-PROBNP SERPL IA-MCNC: 1816 PG/ML (ref 0–125)
O2/TOTAL GAS SETTING VFR VENT: 100 %
PCO2 BLDA: 23.7 MMHG (ref 26–37)
PH BLDA: 7.34 [PH] (ref 7.4–7.5)
PH TEMP ADJ BLDA: 7.33 [PH] (ref 7.4–7.5)
PHOSPHATE SERPL-MCNC: 1.9 MG/DL (ref 2.5–4.5)
PHOSPHATE SERPL-MCNC: 1.9 MG/DL (ref 2.5–4.5)
PLATELET # BLD AUTO: 228 K/UL (ref 164–446)
PLATELET # BLD AUTO: 248 K/UL (ref 164–446)
PMV BLD AUTO: 10 FL (ref 9–12.9)
PMV BLD AUTO: 10.3 FL (ref 9–12.9)
PO2 BLDA: 70 MMHG (ref 64–87)
PO2 TEMP ADJ BLDA: 71 MMHG (ref 64–87)
POTASSIUM SERPL-SCNC: 3.2 MMOL/L (ref 3.6–5.5)
POTASSIUM SERPL-SCNC: 3.6 MMOL/L (ref 3.6–5.5)
POTASSIUM SERPL-SCNC: 3.6 MMOL/L (ref 3.6–5.5)
PROT SERPL-MCNC: 4.9 G/DL (ref 6–8.2)
PROT SERPL-MCNC: 5.8 G/DL (ref 6–8.2)
RBC # BLD AUTO: 3.57 M/UL (ref 4.2–5.4)
RBC # BLD AUTO: 4.06 M/UL (ref 4.2–5.4)
SAO2 % BLDA: 93 % (ref 93–99)
SODIUM SERPL-SCNC: 135 MMOL/L (ref 135–145)
SODIUM SERPL-SCNC: 137 MMOL/L (ref 135–145)
SODIUM SERPL-SCNC: 137 MMOL/L (ref 135–145)
SPECIMEN DRAWN FROM PATIENT: ABNORMAL
TRIGL SERPL-MCNC: 97 MG/DL (ref 0–149)
TROPONIN T SERPL-MCNC: 18 NG/L (ref 6–19)
WBC # BLD AUTO: 6.9 K/UL (ref 4.8–10.8)
WBC # BLD AUTO: 8.3 K/UL (ref 4.8–10.8)

## 2024-07-29 PROCEDURE — 31500 INSERT EMERGENCY AIRWAY: CPT | Performed by: STUDENT IN AN ORGANIZED HEALTH CARE EDUCATION/TRAINING PROGRAM

## 2024-07-29 PROCEDURE — 700102 HCHG RX REV CODE 250 W/ 637 OVERRIDE(OP): Performed by: INTERNAL MEDICINE

## 2024-07-29 PROCEDURE — 84478 ASSAY OF TRIGLYCERIDES: CPT

## 2024-07-29 PROCEDURE — 93005 ELECTROCARDIOGRAM TRACING: CPT | Performed by: HOSPITALIST

## 2024-07-29 PROCEDURE — 83605 ASSAY OF LACTIC ACID: CPT

## 2024-07-29 PROCEDURE — 71045 X-RAY EXAM CHEST 1 VIEW: CPT

## 2024-07-29 PROCEDURE — 94640 AIRWAY INHALATION TREATMENT: CPT

## 2024-07-29 PROCEDURE — 36415 COLL VENOUS BLD VENIPUNCTURE: CPT

## 2024-07-29 PROCEDURE — 94002 VENT MGMT INPAT INIT DAY: CPT

## 2024-07-29 PROCEDURE — 700101 HCHG RX REV CODE 250: Performed by: INTERNAL MEDICINE

## 2024-07-29 PROCEDURE — 82803 BLOOD GASES ANY COMBINATION: CPT

## 2024-07-29 PROCEDURE — 74018 RADEX ABDOMEN 1 VIEW: CPT

## 2024-07-29 PROCEDURE — A9270 NON-COVERED ITEM OR SERVICE: HCPCS | Performed by: INTERNAL MEDICINE

## 2024-07-29 PROCEDURE — 84484 ASSAY OF TROPONIN QUANT: CPT

## 2024-07-29 PROCEDURE — 83735 ASSAY OF MAGNESIUM: CPT

## 2024-07-29 PROCEDURE — 5A1935Z RESPIRATORY VENTILATION, LESS THAN 24 CONSECUTIVE HOURS: ICD-10-PCS | Performed by: STUDENT IN AN ORGANIZED HEALTH CARE EDUCATION/TRAINING PROGRAM

## 2024-07-29 PROCEDURE — 700101 HCHG RX REV CODE 250: Performed by: STUDENT IN AN ORGANIZED HEALTH CARE EDUCATION/TRAINING PROGRAM

## 2024-07-29 PROCEDURE — 99291 CRITICAL CARE FIRST HOUR: CPT | Performed by: INTERNAL MEDICINE

## 2024-07-29 PROCEDURE — 93306 TTE W/DOPPLER COMPLETE: CPT

## 2024-07-29 PROCEDURE — 84100 ASSAY OF PHOSPHORUS: CPT

## 2024-07-29 PROCEDURE — 36600 WITHDRAWAL OF ARTERIAL BLOOD: CPT

## 2024-07-29 PROCEDURE — 700111 HCHG RX REV CODE 636 W/ 250 OVERRIDE (IP): Mod: JZ

## 2024-07-29 PROCEDURE — 700111 HCHG RX REV CODE 636 W/ 250 OVERRIDE (IP): Mod: JZ | Performed by: INTERNAL MEDICINE

## 2024-07-29 PROCEDURE — 85027 COMPLETE CBC AUTOMATED: CPT

## 2024-07-29 PROCEDURE — 80053 COMPREHEN METABOLIC PANEL: CPT

## 2024-07-29 PROCEDURE — 0BH17EZ INSERTION OF ENDOTRACHEAL AIRWAY INTO TRACHEA, VIA NATURAL OR ARTIFICIAL OPENING: ICD-10-PCS | Performed by: STUDENT IN AN ORGANIZED HEALTH CARE EDUCATION/TRAINING PROGRAM

## 2024-07-29 PROCEDURE — 770022 HCHG ROOM/CARE - ICU (200)

## 2024-07-29 PROCEDURE — 82962 GLUCOSE BLOOD TEST: CPT | Mod: 91

## 2024-07-29 PROCEDURE — 99292 CRITICAL CARE ADDL 30 MIN: CPT | Mod: 25 | Performed by: STUDENT IN AN ORGANIZED HEALTH CARE EDUCATION/TRAINING PROGRAM

## 2024-07-29 PROCEDURE — 700105 HCHG RX REV CODE 258: Performed by: STUDENT IN AN ORGANIZED HEALTH CARE EDUCATION/TRAINING PROGRAM

## 2024-07-29 PROCEDURE — 94760 N-INVAS EAR/PLS OXIMETRY 1: CPT

## 2024-07-29 PROCEDURE — 93010 ELECTROCARDIOGRAM REPORT: CPT | Mod: 77 | Performed by: INTERNAL MEDICINE

## 2024-07-29 PROCEDURE — 93005 ELECTROCARDIOGRAM TRACING: CPT | Performed by: INTERNAL MEDICINE

## 2024-07-29 PROCEDURE — 93010 ELECTROCARDIOGRAM REPORT: CPT | Performed by: INTERNAL MEDICINE

## 2024-07-29 PROCEDURE — 93306 TTE W/DOPPLER COMPLETE: CPT | Mod: 26 | Performed by: INTERNAL MEDICINE

## 2024-07-29 PROCEDURE — 700105 HCHG RX REV CODE 258: Performed by: INTERNAL MEDICINE

## 2024-07-29 PROCEDURE — 94003 VENT MGMT INPAT SUBQ DAY: CPT

## 2024-07-29 PROCEDURE — 700111 HCHG RX REV CODE 636 W/ 250 OVERRIDE (IP): Performed by: STUDENT IN AN ORGANIZED HEALTH CARE EDUCATION/TRAINING PROGRAM

## 2024-07-29 PROCEDURE — 83880 ASSAY OF NATRIURETIC PEPTIDE: CPT

## 2024-07-29 PROCEDURE — 99291 CRITICAL CARE FIRST HOUR: CPT | Mod: 25 | Performed by: STUDENT IN AN ORGANIZED HEALTH CARE EDUCATION/TRAINING PROGRAM

## 2024-07-29 RX ORDER — DILTIAZEM HYDROCHLORIDE 5 MG/ML
INJECTION INTRAVENOUS
Status: COMPLETED
Start: 2024-07-29 | End: 2024-07-29

## 2024-07-29 RX ORDER — SODIUM CHLORIDE 9 MG/ML
1000 INJECTION, SOLUTION INTRAVENOUS
Status: DISCONTINUED | OUTPATIENT
Start: 2024-07-29 | End: 2024-07-29

## 2024-07-29 RX ORDER — AMOXICILLIN 250 MG
2 CAPSULE ORAL EVERY EVENING
Status: DISCONTINUED | OUTPATIENT
Start: 2024-07-29 | End: 2024-07-30 | Stop reason: HOSPADM

## 2024-07-29 RX ORDER — PHENYLEPHRINE HCL IN 0.9% NACL 1 MG/10 ML
300 SYRINGE (ML) INTRAVENOUS
Status: ACTIVE | OUTPATIENT
Start: 2024-07-29 | End: 2024-07-29

## 2024-07-29 RX ORDER — POLYETHYLENE GLYCOL 3350 17 G/17G
1 POWDER, FOR SOLUTION ORAL 4 TIMES DAILY
Status: DISCONTINUED | OUTPATIENT
Start: 2024-07-29 | End: 2024-07-30 | Stop reason: HOSPADM

## 2024-07-29 RX ORDER — SODIUM CHLORIDE, SODIUM LACTATE, POTASSIUM CHLORIDE, AND CALCIUM CHLORIDE .6; .31; .03; .02 G/100ML; G/100ML; G/100ML; G/100ML
1000 INJECTION, SOLUTION INTRAVENOUS ONCE
Status: COMPLETED | OUTPATIENT
Start: 2024-07-29 | End: 2024-07-29

## 2024-07-29 RX ORDER — ROCURONIUM BROMIDE 10 MG/ML
50 INJECTION, SOLUTION INTRAVENOUS ONCE
Status: COMPLETED | OUTPATIENT
Start: 2024-07-29 | End: 2024-07-29

## 2024-07-29 RX ORDER — NOREPINEPHRINE BITARTRATE 0.03 MG/ML
0-1 INJECTION, SOLUTION INTRAVENOUS CONTINUOUS
Status: DISCONTINUED | OUTPATIENT
Start: 2024-07-29 | End: 2024-07-30 | Stop reason: HOSPADM

## 2024-07-29 RX ORDER — FLECAINIDE ACETATE 100 MG/1
100 TABLET ORAL 2 TIMES DAILY
Status: DISCONTINUED | OUTPATIENT
Start: 2024-07-29 | End: 2024-07-29

## 2024-07-29 RX ORDER — ADENOSINE 3 MG/ML
INJECTION, SOLUTION INTRAVENOUS
Status: COMPLETED
Start: 2024-07-29 | End: 2024-07-29

## 2024-07-29 RX ORDER — POLYETHYLENE GLYCOL 3350 17 G/17G
1 POWDER, FOR SOLUTION ORAL 4 TIMES DAILY
Status: DISCONTINUED | OUTPATIENT
Start: 2024-07-29 | End: 2024-07-29

## 2024-07-29 RX ORDER — TRAMADOL HYDROCHLORIDE 50 MG/1
50 TABLET ORAL EVERY 6 HOURS PRN
Status: DISCONTINUED | OUTPATIENT
Start: 2024-07-29 | End: 2024-07-29

## 2024-07-29 RX ORDER — TRAMADOL HYDROCHLORIDE 50 MG/1
50 TABLET ORAL EVERY 6 HOURS PRN
Status: DISCONTINUED | OUTPATIENT
Start: 2024-07-29 | End: 2024-07-30 | Stop reason: HOSPADM

## 2024-07-29 RX ORDER — ONDANSETRON 4 MG/1
4 TABLET, ORALLY DISINTEGRATING ORAL EVERY 4 HOURS PRN
Status: DISCONTINUED | OUTPATIENT
Start: 2024-07-29 | End: 2024-07-29

## 2024-07-29 RX ORDER — PHENYLEPHRINE HCL IN 0.9% NACL 1 MG/10 ML
100 SYRINGE (ML) INTRAVENOUS
Status: ACTIVE | OUTPATIENT
Start: 2024-07-29 | End: 2024-07-29

## 2024-07-29 RX ORDER — ACETAMINOPHEN 325 MG/1
650 TABLET ORAL EVERY 6 HOURS PRN
Status: DISCONTINUED | OUTPATIENT
Start: 2024-07-29 | End: 2024-07-29

## 2024-07-29 RX ORDER — PROPOFOL 10 MG/ML
100 INJECTION, EMULSION INTRAVENOUS ONCE
Status: COMPLETED | OUTPATIENT
Start: 2024-07-29 | End: 2024-07-29

## 2024-07-29 RX ORDER — AMOXICILLIN 250 MG
2 CAPSULE ORAL EVERY EVENING
Status: DISCONTINUED | OUTPATIENT
Start: 2024-07-29 | End: 2024-07-29

## 2024-07-29 RX ORDER — DILTIAZEM HYDROCHLORIDE 5 MG/ML
10 INJECTION INTRAVENOUS ONCE
Status: COMPLETED | OUTPATIENT
Start: 2024-07-29 | End: 2024-07-29

## 2024-07-29 RX ORDER — FLECAINIDE ACETATE 100 MG/1
100 TABLET ORAL 2 TIMES DAILY
Status: DISCONTINUED | OUTPATIENT
Start: 2024-07-29 | End: 2024-07-30 | Stop reason: HOSPADM

## 2024-07-29 RX ORDER — ONDANSETRON 4 MG/1
4 TABLET, ORALLY DISINTEGRATING ORAL EVERY 4 HOURS PRN
Status: DISCONTINUED | OUTPATIENT
Start: 2024-07-29 | End: 2024-07-30 | Stop reason: HOSPADM

## 2024-07-29 RX ORDER — CLONAZEPAM 0.5 MG/1
0.5 TABLET ORAL
Status: DISCONTINUED | OUTPATIENT
Start: 2024-07-29 | End: 2024-07-29

## 2024-07-29 RX ORDER — ADENOSINE 3 MG/ML
12 INJECTION, SOLUTION INTRAVENOUS ONCE
Status: COMPLETED | OUTPATIENT
Start: 2024-07-29 | End: 2024-07-29

## 2024-07-29 RX ORDER — SODIUM CHLORIDE 9 MG/ML
1000 INJECTION, SOLUTION INTRAVENOUS ONCE
Status: DISCONTINUED | OUTPATIENT
Start: 2024-07-29 | End: 2024-07-29

## 2024-07-29 RX ORDER — CLONAZEPAM 0.5 MG/1
0.5 TABLET ORAL
Status: DISCONTINUED | OUTPATIENT
Start: 2024-07-29 | End: 2024-07-30 | Stop reason: HOSPADM

## 2024-07-29 RX ORDER — ADENOSINE 3 MG/ML
6 INJECTION, SOLUTION INTRAVENOUS ONCE
Status: COMPLETED | OUTPATIENT
Start: 2024-07-29 | End: 2024-07-29

## 2024-07-29 RX ORDER — ACETAMINOPHEN 325 MG/1
650 TABLET ORAL EVERY 6 HOURS PRN
Status: DISCONTINUED | OUTPATIENT
Start: 2024-07-29 | End: 2024-07-30 | Stop reason: HOSPADM

## 2024-07-29 RX ADMIN — Medication 100 MCG: at 14:00

## 2024-07-29 RX ADMIN — POLYETHYLENE GLYCOL 3350 1 PACKET: 17 POWDER, FOR SOLUTION ORAL at 23:43

## 2024-07-29 RX ADMIN — SODIUM CHLORIDE, POTASSIUM CHLORIDE, SODIUM LACTATE AND CALCIUM CHLORIDE 1000 ML: 600; 310; 30; 20 INJECTION, SOLUTION INTRAVENOUS at 08:23

## 2024-07-29 RX ADMIN — NOREPINEPHRINE BITARTRATE 0.1 MCG/KG/MIN: 1 INJECTION, SOLUTION, CONCENTRATE INTRAVENOUS at 20:03

## 2024-07-29 RX ADMIN — DILTIAZEM HYDROCHLORIDE 10 MG: 5 INJECTION INTRAVENOUS at 08:51

## 2024-07-29 RX ADMIN — SODIUM BICARBONATE 50 MEQ: 84 INJECTION INTRAVENOUS at 10:25

## 2024-07-29 RX ADMIN — DILTIAZEM HYDROCHLORIDE 20 MG/HR: 5 INJECTION INTRAVENOUS at 07:44

## 2024-07-29 RX ADMIN — FLECAINIDE ACETATE 100 MG: 100 TABLET ORAL at 08:08

## 2024-07-29 RX ADMIN — ROCURONIUM BROMIDE 50 MG: 50 INJECTION, SOLUTION INTRAVENOUS at 11:04

## 2024-07-29 RX ADMIN — Medication 300 MCG: at 11:07

## 2024-07-29 RX ADMIN — PANTOPRAZOLE SODIUM 40 MG: 40 INJECTION, POWDER, FOR SOLUTION INTRAVENOUS at 17:19

## 2024-07-29 RX ADMIN — PROPOFOL 5.14 MCG/KG/MIN: 10 INJECTION, EMULSION INTRAVENOUS at 11:12

## 2024-07-29 RX ADMIN — METOPROLOL SUCCINATE 100 MG: 100 TABLET, EXTENDED RELEASE ORAL at 17:19

## 2024-07-29 RX ADMIN — ACETAMINOPHEN 650 MG: 325 TABLET ORAL at 03:23

## 2024-07-29 RX ADMIN — Medication 50 MCG/HR: at 11:13

## 2024-07-29 RX ADMIN — PROPOFOL 100 MG: 10 INJECTION, EMULSION INTRAVENOUS at 11:04

## 2024-07-29 RX ADMIN — SODIUM CHLORIDE, POTASSIUM CHLORIDE, SODIUM LACTATE AND CALCIUM CHLORIDE 1000 ML: 600; 310; 30; 20 INJECTION, SOLUTION INTRAVENOUS at 08:15

## 2024-07-29 RX ADMIN — DILTIAZEM HYDROCHLORIDE 5 MG/HR: 5 INJECTION, SOLUTION INTRAVENOUS at 14:40

## 2024-07-29 RX ADMIN — ADENOSINE 6 MG: 3 INJECTION INTRAVENOUS at 05:52

## 2024-07-29 RX ADMIN — TRAMADOL HYDROCHLORIDE 50 MG: 50 TABLET, COATED ORAL at 08:02

## 2024-07-29 RX ADMIN — ADENOSINE 6 MG: 3 INJECTION, SOLUTION INTRAVENOUS at 05:52

## 2024-07-29 RX ADMIN — FLECAINIDE ACETATE 100 MG: 100 TABLET ORAL at 17:19

## 2024-07-29 RX ADMIN — SODIUM BICARBONATE 50 MEQ: 84 INJECTION INTRAVENOUS at 10:30

## 2024-07-29 RX ADMIN — DILTIAZEM HYDROCHLORIDE 5 MG/HR: 5 INJECTION INTRAVENOUS at 06:09

## 2024-07-29 RX ADMIN — SENNOSIDES AND DOCUSATE SODIUM 2 TABLET: 50; 8.6 TABLET ORAL at 17:19

## 2024-07-29 RX ADMIN — ADENOSINE 12 MG: 3 INJECTION INTRAVENOUS at 05:56

## 2024-07-29 ASSESSMENT — COGNITIVE AND FUNCTIONAL STATUS - GENERAL
PERSONAL GROOMING: A LITTLE
SUGGESTED CMS G CODE MODIFIER DAILY ACTIVITY: CK
EATING MEALS: A LITTLE
DRESSING REGULAR UPPER BODY CLOTHING: A LITTLE
MOBILITY SCORE: 12
SUGGESTED CMS G CODE MODIFIER MOBILITY: CL
DRESSING REGULAR LOWER BODY CLOTHING: A LITTLE
HELP NEEDED FOR BATHING: A LITTLE
TOILETING: A LITTLE
CLIMB 3 TO 5 STEPS WITH RAILING: A LOT
DAILY ACTIVITIY SCORE: 18
MOVING TO AND FROM BED TO CHAIR: A LOT
TURNING FROM BACK TO SIDE WHILE IN FLAT BAD: A LOT
MOVING FROM LYING ON BACK TO SITTING ON SIDE OF FLAT BED: A LOT
WALKING IN HOSPITAL ROOM: A LOT
STANDING UP FROM CHAIR USING ARMS: A LOT

## 2024-07-29 ASSESSMENT — ENCOUNTER SYMPTOMS
WEAKNESS: 1
ABDOMINAL PAIN: 1
MYALGIAS: 0
FOCAL WEAKNESS: 0
CHILLS: 0
COUGH: 0
CONSTIPATION: 1
EYE REDNESS: 0
SHORTNESS OF BREATH: 0
FEVER: 0
NAUSEA: 1

## 2024-07-29 ASSESSMENT — PAIN DESCRIPTION - PAIN TYPE
TYPE: ACUTE PAIN

## 2024-07-29 NOTE — CONSULTS
Critical Care Consultation    Date of consult: 7/29/2024    Referring Physician  Kylah Simms M.D.    Reason for Consultation  SVT    History of Presenting Illness  83 y.o. female with history of atrial fibrillation on eliquis who presented 7/27/2024 with nausea, rectal and abdominal pain. She was noted to be severe constipated with hard stool in rectum. She was given enema and tried manual disimpaction by RN on 7/28 w/severe pain and unable to get anything out. Case was then discussed with GI (Christine) who recommended golytely and dulcolax.   Of note, patient had CT A/P on 7/27 that showed constipation with large volume stool in rectal vault with rectal wall thickening and adjacent fat stranding c/f fecal impaction and proctocolitis. Also with fluid filled prominence of small bowel c/f ileus vs enteritis.  Patient     Code Status  Full Code    Review of Systems  Review of Systems   Constitutional:  Positive for malaise/fatigue. Negative for chills and fever.   Eyes:  Negative for redness.   Respiratory:  Negative for cough and shortness of breath.    Cardiovascular:  Negative for chest pain.   Gastrointestinal:  Positive for abdominal pain, constipation and nausea.   Musculoskeletal:  Negative for myalgias.   Neurological:  Positive for weakness. Negative for focal weakness.       Past Medical History   has a past medical history of Arrhythmia, Asthma, Atrial fibrillation (HCC), High cholesterol, History of cardiac radiofrequency ablation (12/01/2017), and TIA (transient ischemic attack) (10/2020).    Surgical History   has a past surgical history that includes other cardiac surgery; cholecystectomy (08/08/2003); and other (Bilateral).    Family History  family history is not on file.    Social History   reports that she has never smoked. She has never used smokeless tobacco. She reports that she does not currently use alcohol. She reports that she does not currently use drugs.    Medications  Home  Medications       Reviewed by Rob Muñoz PharmD (Pharmacist) on 07/27/24 at 2103  Med List Status: Complete     Medication Last Dose Status   acyclovir (ZOVIRAX) 400 MG tablet  Active   apixaban (ELIQUIS) 2.5mg Tab 7/25/2024 Active   clonazePAM (KLONOPIN) 0.5 MG Tab 7/25/2024 Active   estrogens, conjugated (PREMARIN) 0.3 MG Tab 7/25/2024 Active   Evolocumab (REPATHA) 140 MG/ML Solution Auto-injector SubQ injection pen 7/21/2024 Active   flecainide (TAMBOCOR) 100 MG Tab 7/25/2024 Active   metoprolol SR (TOPROL XL) 100 MG TABLET SR 24 HR 7/25/2024 Active                  Audit from Redirected Encounters    **Home medications have not yet been reviewed for this encounter**       Current Facility-Administered Medications   Medication Dose Route Frequency Provider Last Rate Last Admin    dilTIAZem (Cardizem) 100 mg in dextrose 5% 100 mL Infusion  0-20 mg/hr Intravenous Continuous SCARLETT CruzO. 10 mL/hr at 07/29/24 0850 10 mg/hr at 07/29/24 0850    NS (Bolus) 0.9 % infusion 1,000 mL  1,000 mL Intravenous Once SCARLETT CruzO.   Dose not Required at 07/29/24 0730    traMADol (Ultram) 50 MG tablet 50 mg  50 mg Oral Q6HRS PRN Kylah Simms M.D.        sodium bicarbonate tablet 650 mg  650 mg Oral TID Kylah Simms M.D.   650 mg at 07/28/24 2113    senna-docusate (Pericolace Or Senokot S) 8.6-50 MG per tablet 2 Tablet  2 Tablet Oral Q EVENING Kylah Simms M.D.   2 Tablet at 07/28/24 1806    And    polyethylene glycol/lytes (Miralax) Packet 1 Packet  1 Packet Oral 4X/DAY Kylah Simms M.D.   1 Packet at 07/28/24 2114    hydrocortisone 1 % cream   Topical BID Kylah Simms M.D.   Given at 07/28/24 1813    clonazePAM (KlonoPIN) tablet 0.5 mg  0.5 mg Oral QHS PRN Eric Hines D.O.   0.5 mg at 07/28/24 2218    estrogens, conjugated (Premarin) tablet 0.3 mg  0.3 mg Oral DAILY Eric Hines D.O.        flecainide (Tambocor) tablet 100 mg  100 mg Oral BID Eric Hines D.O.   100  mg at 07/29/24 0808    metoprolol SR (Toprol XL) tablet 100 mg  100 mg Oral DAILY Eric Hines D.O.        NS infusion   Intravenous Continuous Kylah Simms M.D.   Stopped at 07/29/24 0527    acetaminophen (Tylenol) tablet 650 mg  650 mg Oral Q6HRS PRN SCARLETT CruzOChristina   650 mg at 07/29/24 0323    labetalol (Normodyne/Trandate) injection 10 mg  10 mg Intravenous Q4HRS PRN Eric Hines D.O.        ondansetron (Zofran) syringe/vial injection 4 mg  4 mg Intravenous Q4HRS PRN Eric Hines D.O.        ondansetron (Zofran ODT) dispertab 4 mg  4 mg Oral Q4HRS PRN Eric Hines D.O.        pantoprazole (Protonix) injection 40 mg  40 mg Intravenous BID Eric Hines D.O.   40 mg at 07/28/24 1807    insulin regular (HumuLIN R,NovoLIN R) injection  1-6 Units Subcutaneous 4X/DAY ACHS Eric Hines D.O.        And    dextrose 50% (D50W) injection 25 g  25 g Intravenous Q15 MIN PRN Eric Hines D.O.        haloperidol lactate (Haldol) injection 1 mg  1 mg Intravenous Q3HRS PRN SCARLETT CruzO.   1 mg at 07/27/24 2249    bisacodyl (Dulcolax) suppository 10 mg  10 mg Rectal DAILY SCARLETT CruzO.   10 mg at 07/28/24 0748       Allergies  Allergies   Allergen Reactions    Blue Dyes Vomiting    Green Dyes Vomiting    Iodine Hives    Iodine Solution [Povidone Iodine] Unspecified     Red spots, pain    Omeprazole Unspecified     Urinary incontinence     Tape Unspecified     Welts on skin, paper tape is ok       Vital Signs last 24 hours  Temp:  [36.9 °C (98.4 °F)-37.2 °C (99 °F)] 37.2 °C (99 °F)  Pulse:  [] 129  Resp:  [17-31] 23  BP: ()/(54-94) 109/63  SpO2:  [92 %-96 %] 94 %    Physical Exam  Physical Exam  Vitals and nursing note reviewed.   Constitutional:       General: She is not in acute distress.     Appearance: She is ill-appearing.   HENT:      Head: Normocephalic.      Mouth/Throat:      Mouth: Mucous membranes are dry.   Eyes:      Conjunctiva/sclera: Conjunctivae  normal.   Cardiovascular:      Rate and Rhythm: Regular rhythm. Tachycardia present.   Pulmonary:      Effort: Pulmonary effort is normal. No respiratory distress.   Abdominal:      General: There is distension.      Tenderness: There is abdominal tenderness.   Musculoskeletal:         General: No deformity.   Skin:     General: Skin is warm.   Neurological:      Mental Status: She is alert. Mental status is at baseline.   Psychiatric:         Mood and Affect: Mood normal.         Fluids    Intake/Output Summary (Last 24 hours) at 7/29/2024 0858  Last data filed at 7/29/2024 0850  Gross per 24 hour   Intake 2630.54 ml   Output --   Net 2630.54 ml       Laboratory  Recent Results (from the past 48 hour(s))   CBC WITH DIFFERENTIAL    Collection Time: 07/27/24  7:33 PM   Result Value Ref Range    WBC 7.7 4.8 - 10.8 K/uL    RBC 4.62 4.20 - 5.40 M/uL    Hemoglobin 14.5 12.0 - 16.0 g/dL    Hematocrit 41.3 37.0 - 47.0 %    MCV 89.4 81.4 - 97.8 fL    MCH 31.4 27.0 - 33.0 pg    MCHC 35.1 32.2 - 35.5 g/dL    RDW 39.6 35.9 - 50.0 fL    Platelet Count 304 164 - 446 K/uL    MPV 9.9 9.0 - 12.9 fL    Neutrophils-Polys 73.20 (H) 44.00 - 72.00 %    Lymphocytes 19.30 (L) 22.00 - 41.00 %    Monocytes 6.50 0.00 - 13.40 %    Eosinophils 0.40 0.00 - 6.90 %    Basophils 0.30 0.00 - 1.80 %    Immature Granulocytes 0.30 0.00 - 0.90 %    Nucleated RBC 0.00 0.00 - 0.20 /100 WBC    Neutrophils (Absolute) 5.65 1.82 - 7.42 K/uL    Lymphs (Absolute) 1.49 1.00 - 4.80 K/uL    Monos (Absolute) 0.50 0.00 - 0.85 K/uL    Eos (Absolute) 0.03 0.00 - 0.51 K/uL    Baso (Absolute) 0.02 0.00 - 0.12 K/uL    Immature Granulocytes (abs) 0.02 0.00 - 0.11 K/uL    NRBC (Absolute) 0.00 K/uL   COMP METABOLIC PANEL    Collection Time: 07/27/24  7:33 PM   Result Value Ref Range    Sodium 130 (L) 135 - 145 mmol/L    Potassium 4.2 3.6 - 5.5 mmol/L    Chloride 95 (L) 96 - 112 mmol/L    Co2 16 (L) 20 - 33 mmol/L    Anion Gap 19.0 (H) 7.0 - 16.0    Glucose 124 (H) 65 - 99  mg/dL    Bun 17 8 - 22 mg/dL    Creatinine 0.76 0.50 - 1.40 mg/dL    Calcium 9.7 8.4 - 10.2 mg/dL    Correct Calcium 9.7 8.5 - 10.5 mg/dL    AST(SGOT) 100 (H) 12 - 45 U/L    ALT(SGPT) 112 (H) 2 - 50 U/L    Alkaline Phosphatase 161 (H) 30 - 99 U/L    Total Bilirubin 0.6 0.1 - 1.5 mg/dL    Albumin 4.0 3.2 - 4.9 g/dL    Total Protein 6.7 6.0 - 8.2 g/dL    Globulin 2.7 1.9 - 3.5 g/dL    A-G Ratio 1.5 g/dL   PROTHROMBIN TIME (INR)    Collection Time: 07/27/24  7:33 PM   Result Value Ref Range    PT 13.5 12.0 - 14.6 sec    INR 0.98 0.87 - 1.13   APTT    Collection Time: 07/27/24  7:33 PM   Result Value Ref Range    APTT 32.1 24.7 - 36.0 sec   LACTIC ACID    Collection Time: 07/27/24  7:33 PM   Result Value Ref Range    Lactic Acid 1.4 0.5 - 2.0 mmol/L   ESTIMATED GFR    Collection Time: 07/27/24  7:33 PM   Result Value Ref Range    GFR (CKD-EPI) 77 >60 mL/min/1.73 m 2   ABO Rh Confirm    Collection Time: 07/27/24  7:33 PM   Result Value Ref Range    ABO Rh Confirm O NEG    COD (ADULT)    Collection Time: 07/27/24  7:46 PM   Result Value Ref Range    ABO Grouping Only O     Rh Grouping Only NEG     Antibody Screen-Cod NEG    POCT glucose device results    Collection Time: 07/27/24 10:30 PM   Result Value Ref Range    POC Glucose, Blood 122 (H) 65 - 99 mg/dL   CBC without Differential    Collection Time: 07/28/24 12:25 AM   Result Value Ref Range    WBC 8.9 4.8 - 10.8 K/uL    RBC 4.50 4.20 - 5.40 M/uL    Hemoglobin 14.1 12.0 - 16.0 g/dL    Hematocrit 41.9 37.0 - 47.0 %    MCV 93.1 81.4 - 97.8 fL    MCH 31.3 27.0 - 33.0 pg    MCHC 33.7 32.2 - 35.5 g/dL    RDW 42.3 35.9 - 50.0 fL    Platelet Count 230 164 - 446 K/uL    MPV 10.2 9.0 - 12.9 fL   Comp Metabolic Panel    Collection Time: 07/28/24 12:25 AM   Result Value Ref Range    Sodium 130 (L) 135 - 145 mmol/L    Potassium 4.2 3.6 - 5.5 mmol/L    Chloride 97 96 - 112 mmol/L    Co2 13 (L) 20 - 33 mmol/L    Anion Gap 20.0 (H) 7.0 - 16.0    Glucose 115 (H) 65 - 99 mg/dL    Bun  16 8 - 22 mg/dL    Creatinine 0.78 0.50 - 1.40 mg/dL    Calcium 9.0 8.4 - 10.2 mg/dL    Correct Calcium 9.5 8.5 - 10.5 mg/dL    AST(SGOT) 94 (H) 12 - 45 U/L    ALT(SGPT) 96 (H) 2 - 50 U/L    Alkaline Phosphatase 148 (H) 30 - 99 U/L    Total Bilirubin 0.5 0.1 - 1.5 mg/dL    Albumin 3.4 3.2 - 4.9 g/dL    Total Protein 6.1 6.0 - 8.2 g/dL    Globulin 2.7 1.9 - 3.5 g/dL    A-G Ratio 1.3 g/dL   LACTIC ACID    Collection Time: 07/28/24 12:25 AM   Result Value Ref Range    Lactic Acid 1.2 0.5 - 2.0 mmol/L   ESTIMATED GFR    Collection Time: 07/28/24 12:25 AM   Result Value Ref Range    GFR (CKD-EPI) 74 >60 mL/min/1.73 m 2   POCT glucose device results    Collection Time: 07/28/24  5:43 AM   Result Value Ref Range    POC Glucose, Blood 126 (H) 65 - 99 mg/dL   POCT glucose device results    Collection Time: 07/28/24 11:33 AM   Result Value Ref Range    POC Glucose, Blood 125 (H) 65 - 99 mg/dL   HEMOGLOBIN AND HEMATOCRIT    Collection Time: 07/28/24 11:54 AM   Result Value Ref Range    Hemoglobin 13.5 12.0 - 16.0 g/dL    Hematocrit 38.7 37.0 - 47.0 %   POCT glucose device results    Collection Time: 07/28/24  6:05 PM   Result Value Ref Range    POC Glucose, Blood 128 (H) 65 - 99 mg/dL   POCT glucose device results    Collection Time: 07/28/24  9:17 PM   Result Value Ref Range    POC Glucose, Blood 106 (H) 65 - 99 mg/dL   CBC WITHOUT DIFFERENTIAL    Collection Time: 07/29/24  2:30 AM   Result Value Ref Range    WBC 8.3 4.8 - 10.8 K/uL    RBC 4.06 (L) 4.20 - 5.40 M/uL    Hemoglobin 12.6 12.0 - 16.0 g/dL    Hematocrit 36.8 (L) 37.0 - 47.0 %    MCV 90.6 81.4 - 97.8 fL    MCH 31.0 27.0 - 33.0 pg    MCHC 34.2 32.2 - 35.5 g/dL    RDW 41.7 35.9 - 50.0 fL    Platelet Count 248 164 - 446 K/uL    MPV 10.0 9.0 - 12.9 fL   Basic Metabolic Panel    Collection Time: 07/29/24  2:30 AM   Result Value Ref Range    Sodium 137 135 - 145 mmol/L    Potassium 3.6 3.6 - 5.5 mmol/L    Chloride 101 96 - 112 mmol/L    Co2 16 (L) 20 - 33 mmol/L     Glucose 109 (H) 65 - 99 mg/dL    Bun 13 8 - 22 mg/dL    Creatinine 0.98 0.50 - 1.40 mg/dL    Calcium 8.9 8.4 - 10.2 mg/dL    Anion Gap 20.0 (H) 7.0 - 16.0   ESTIMATED GFR    Collection Time: 24  2:30 AM   Result Value Ref Range    GFR (CKD-EPI) 57 (A) >60 mL/min/1.73 m 2   EKG    Collection Time: 24  5:21 AM   Result Value Ref Range    Report       [facility]    Test Date:  2024  Pt Name:    LAST TAVERAS                  Department: [facility]  MRN:        [id number]                      Room:       2216  Gender:     Female                       Technician: [id number]  :        [id number]                   Requested By:EMILY MCRATHUR  Order #:    [id number]                    Reading MD: Sherrie Killian    Measurements  Intervals                                Axis  Rate:       199                          P:          29  CA:         68                           QRS:        206  QRSD:       80                           T:          50  QT:         260  QTc:        473    Interpretive Statements  Artifacts in leads  Supraventricular tachycardia, 199 bpm  Early transition  Borderline low voltages in limb leads  ST changes likely rate related  Compared to ECG 2024 15:09:22  Rhythm change has occurred  Electronically Signed On 2024 05:42:23 PDT by Sherrie Killian     LACTIC ACID    Collection Time: 24  8:26 AM   Result Value Ref Range    Lactic Acid 1.4 0.5 - 2.0 mmol/L       Imaging  Reviewed     Assessment/Plan    SVT  Atrial fibrillation   Seems to still be in afib as her underlying rhythm but then went into SVT this AM that did not seem to be responding to dilt gtt so gave 1 LR and it improved to 100s. Went back into SVT later so started another liter LR and gave dilt push 10mg with improvement to 110s.  - LR boluses as needed  - continue mIVFs at 100cc/hr  - NPO for possible cardioversion if needed  - she's been off eliquis due to c/f GI bleed  - dilt IV pushes as needed  - re-start dilt gtt  -  repeat CMP, mg, phos    Severe constipation  Proctocolitis 2/2 rectal impaction  Has tried enema, golytely and manual disimpaction w/o any success and with severe rectal pain  - KUB stat  - IVFs as noted above  - d/w GI about disimpaction under anesthesia - they will either try to find an OR time for her or we can do it here in the ICU under moderate sedation  - f/u lactate    GI bleed  Stool seems to be brown smears but has blood on toilet paper upon wiping so likely hemorrhoids  - ok to hold eliquis since we have plan for manual disimpaction today and restart after that  - monitor H/H    Anion gap metabolic acidosis  Likely from poor PO intake for several days now.   - IVFs as noted  - management of constipation as noted  - monitor        Discussed patient condition and risk of morbidity and/or mortality with Family, RN, RT, Pharmacy, Charge nurse / hot rounds, and Patient.    The patient remains critically ill.  Critical care time = 80 minutes in directly providing and coordinating critical care and extensive data review.  No time overlap and excludes procedures.        Jonelle Tovar MD  Pulmonary and Critical Care Medicine  [facility]

## 2024-07-29 NOTE — PROGRESS NOTES
4 Eyes Skin Assessment Completed by Elba, RN and AXVIER Lance.    Head WDL  Ears WDL  Nose WDL  Mouth WDL  Neck WDL  Breast/Chest WDL  Shoulder Blades WDL  Spine WDL  (R) Arm/Elbow/Hand WDL  (L) Arm/Elbow/Hand WDL  Abdomen WDL  Groin WDL  Scrotum/Coccyx/Buttocks Redness and Blanching  (R) Leg WDL  (L) Leg WDL  (R) Heel/Foot/Toe WDL  (L) Heel/Foot/Toe WDL          Devices In Places ECG, Blood Pressure Cuff, Pulse Ox, Flores, and ET Tube      Interventions In Place Gray Ear Foams, TAP System, Pillows, Q2 Turns, Low Air Loss Mattress, and Heels Loaded W/Pillows    Possible Skin Injury No    Pictures Uploaded Into Epic N/A  Wound Consult Placed N/A  RN Wound Prevention Protocol Ordered No

## 2024-07-29 NOTE — PROCEDURES
Intubation    Date/Time: 7/29/2024 3:41 PM    Performed by: Jonelle Tovar M.D.  Authorized by: Jonelle Tovar M.D.    Consent:     Consent obtained:  Verbal    Consent given by:  Patient and spouse  Universal protocol:     Procedure explained and questions answered to patient or proxy's satisfaction: yes      Relevant documents present and verified: yes      Test results available and properly labeled: yes      Imaging studies available: yes      Required blood products, implants, devices, and special equipment available: yes      Site/side marked: yes      Immediately prior to procedure, a time out was called: yes      Patient identity confirmed:  Arm band  Pre-procedure details:     Patient status:  Awake    Mallampati score:  II    Pretreatment meds: propfol 60mg.    Paralytics:  Rocuronium  Procedure details:     Preoxygenation:  Nonrebreather mask    CPR in progress: no      Intubation method:  Oral    Oral intubation technique:  Video-assisted    Laryngoscope type:  GlideScope    Laryngoscope blade:  Mac 3    Cormack-Lehane Classification:  Grade 4    Tube size (mm):  7.5    Tube type:  Cuffed    Number of attempts:  1    Cricoid pressure: no      Tube visualized through cords: yes    Placement assessment:     ETT to lip:  24    ETT to teeth:  23    Tube secured with:  ETT dubois    Breath sounds:  Equal    Placement verification: chest rise, CXR verification, equal breath sounds, ETCO2 detector and tube exhalation      CXR findings:  ETT in proper place  Post-procedure details:     Patient tolerance of procedure:  Tolerated well, no immediate complications          Jonelle Tovar MD  Pulmonary and Critical Care Medicine  [facility]

## 2024-07-29 NOTE — PROGRESS NOTES
Brief note    Patient went into flash pulmonary edema shortly after control of her 2nd round of SVT and required NRB and then switched to HFNC 40L/100% with spo2 in low 90s. She remains on dilt gtt at 20 with intermittent HR increases to 150s but mostly remains in 100-110s. Discussed case with GI that she needs urgent disimpaction since the severe constipation is what's leading to her SVT and pulmonary edema. Since patient is on such high O2 requirements and need for significant analgesia/sedation w/manual disipmaction, decision was made to intubate, stabilize and sedate her for procedure. She was given 2 amps of bicarb for her metabolic acidosis.  She was intubated and sedated with prop/fent. She underwent manual disimpaction with GI bedside with removal of all the hard obstructive stool. She also had significantly improved UOP after this (RNs tried to place a holden in her several times prior to intubation but unable to likely due to severe obstruction from this impaction). RN was able to place holden and she has had good UOP since then.   Patient was started on bowel regimen and will wean vent settings and sedation with goal of extubation.    The patient remains critically ill.  Critical care time = 45 minutes in directly providing and coordinating critical care and extensive data review.  No time overlap and excludes procedures.       Jonelle Tovar MD  Pulmonary and Critical Care Medicine  [facility]

## 2024-07-29 NOTE — CARE PLAN
The patient is Stable - Low risk of patient condition declining or worsening    Shift Goals  Clinical Goals: Pt's pain and nausea will be managed, BM and free from falls during this shift  Patient Goals: Able to manage pain, BM and rest comfortably  Family Goals: Manage pain, update on POC    Progress made toward(s) clinical / shift goals:  Pt reported pain improved post intervention.Bloody smears during this shift. Started on clear liquid diet, tolerated, denies nausea. PT eval done. Ambulated to bathroom. Pt did not sustain any fall during this shift.     Patient is not progressing towards the following goals:  Bowel protocol started, No BM only bloody smears.     Problem: Bowel Elimination  Goal: Establish and maintain regular bowel function  Outcome: Not Progressing

## 2024-07-29 NOTE — CARE PLAN
The patient is Watcher - Medium risk of patient condition declining or worsening    Shift Goals  Clinical Goals: HR<120, SBP>90, SPO2>92%  Patient Goals: pain management  Family Goals: for pt to get better    Progress made toward(s) clinical / shift goals:      Patient is not progressing towards the following goals:

## 2024-07-29 NOTE — CARE PLAN
The patient is Stable - Low risk of patient condition declining or worsening    Shift Goals  Clinical Goals: Patient to have a bowel movement and remain free from falls  Patient Goals: Sleep/rest overnight  Family Goals: Manage pain, update on POC    Progress made toward(s) clinical / shift goals:  Patient continues to not have bowel movements.    Patient is not progressing towards the following goals:

## 2024-07-29 NOTE — PROGRESS NOTES
BSSR received from day shift RN. Patient laying in bed in no apparent distress with c/o pain. A&O X4. Family at bedside. Patient working on her saulo. Plan of care discussed with patient. Bed in low position with bed brakes applied and call light in reach. Patient states no needs at this time.

## 2024-07-29 NOTE — CONSULTS
Gastroenterology Initial Consult Note               Author:  HAO Greenwood Date & Time Created: 7/29/2024 11:38 AM       Patient ID:  Name:             Gloria Herndon  YOB: 1940  Age:                 83 y.o.  female  MRN:               [id number]      Referring Provider:  Dr. Tovar      Presenting Chief Complaint:  Fecal Impaction      History of Present Illness:    This is a very pleasant 83 y.o. female with the past medical history as listed below. She was admitted with abdominal pain and constipation. CT 7/27 demonstrated moderate constipation with fecal impaction and possible proctocolitis. She took golytely as recommended with no passage of stool and was also administered an enema yesterday. Today the patient's abdomen became more painful and distended. She developed SVT and was transferred to the ICU. She was found to have pulmonary edema possibly due to fluid overload. Repeat KUB this AM demonstrated severe fecal impaction and displacement of the bladder. The patient was intubated for oxygenation and airway in addition to possibly needing a more involved fecal impaction.      Review of Systems:  Review of Systems   Unable to perform ROS: Intubated             Past Medical History:  Past Medical History:   Diagnosis Date    Arrhythmia     Pt states she has Atrial Fib    Asthma     Atrial fibrillation (HCC)     High cholesterol     History of cardiac radiofrequency ablation 12/01/2017    TIA (transient ischemic attack) 10/2020     Active Hospital Problems    Diagnosis     SVT (supraventricular tachycardia) (HCC) [I47.10]     Acute GI bleeding [K92.2]     High anion gap metabolic acidosis [E87.29]     Hyponatremia [E87.1]     Hyperglycemia [R73.9]     Elevated liver enzymes [R74.8]     Ileus (HCC) [K56.7]     Mild late onset Alzheimer's dementia without behavioral disturbance, psychotic disturbance, mood disturbance, or anxiety (HCC) [G30.1, F02.A0]     History of atrial  fibrillation [Z86.79]          Past Surgical History:  Past Surgical History:   Procedure Laterality Date    CHOLECYSTECTOMY  08/08/2003    OTHER Bilateral     Lens 2013    OTHER CARDIAC SURGERY      ablation           Hospital Medications:  Current Facility-Administered Medications   Medication Dose Frequency Provider Last Rate Last Admin    NS (Bolus) 0.9 % infusion 1,000 mL  1,000 mL Once Eric Hines D.O.   Dose not Required at 07/29/24 0730    traMADol (Ultram) 50 MG tablet 50 mg  50 mg Q6HRS PRN Kylah Simms M.D.        dilTIAZem (Cardizem) 100 mg in dextrose 5% 100 mL Infusion  0-20 mg/hr Continuous JonelleJULIO CESAR RiceDChristina 20 mL/hr at 07/29/24 1000 20 mg/hr at 07/29/24 1000    phenylephrine (Lee-Synephrine) 40 mg in  mL infusion  0-5 mcg/kg/min (Ideal) Continuous Jonelle Tovar M.D.   Dose not Required at 07/29/24 1100    fentaNYL (Sublimaze) injection 50 mcg  50 mcg Q15 MIN PRN Jonelle Tovar M.D.        And    fentaNYL (Sublimaze) injection 100 mcg  100 mcg Q15 MIN PRN Jonelle Tovar M.D.        And    fentaNYL (SUBLIMAZE) 50 mcg/mL in 50mL (Continuous Infusion)   Continuous Jonelle Tovar M.D. 1 mL/hr at 07/29/24 1113 50 mcg/hr at 07/29/24 1113    And    propofol (DIPRIVAN) injection  0-80 mcg/kg/min (Ideal) Continuous JULIO CESAR AnguianoDChristina 1.9 mL/hr at 07/29/24 1112 5.141 mcg/kg/min at 07/29/24 1112    phenylephrine (Lee-Synephrine) 100 mcg/mL inj (IV Push Syringe) 300 mcg  300 mcg Q5 MIN PRN Jonelle Tovar M.D.   300 mcg at 07/29/24 1107    senna-docusate (Pericolace Or Senokot S) 8.6-50 MG per tablet 2 Tablet  2 Tablet Q EVENING Kylah Simms M.D.   2 Tablet at 07/28/24 1806    And    polyethylene glycol/lytes (Miralax) Packet 1 Packet  1 Packet 4X/DAY Kylah Simms M.D.   1 Packet at 07/28/24 2114    hydrocortisone 1 % cream   BID Kylah Simms M.D.   Given at 07/28/24 1813    clonazePAM (KlonoPIN) tablet 0.5 mg  0.5 mg QHS PRN Eric GREGORIO  SCARLETT HinesOChristina   0.5 mg at 07/28/24 2218    estrogens, conjugated (Premarin) tablet 0.3 mg  0.3 mg DAILY Eric Hines D.O.        flecainide (Tambocor) tablet 100 mg  100 mg BID SCARLETT CruzO.   100 mg at 07/29/24 0808    metoprolol SR (Toprol XL) tablet 100 mg  100 mg DAILY Eric Hines D.O.        NS infusion   Continuous Kylah Simms M.D.   Stopped at 07/29/24 0527    acetaminophen (Tylenol) tablet 650 mg  650 mg Q6HRS PRN SCARLETT CruzOChristina   650 mg at 07/29/24 0323    labetalol (Normodyne/Trandate) injection 10 mg  10 mg Q4HRS PRN SCARLETT CruzO.        ondansetron (Zofran) syringe/vial injection 4 mg  4 mg Q4HRS PRN SCARLETT CruzO.        ondansetron (Zofran ODT) dispertab 4 mg  4 mg Q4HRS PRN SCARLETT CruzO.        pantoprazole (Protonix) injection 40 mg  40 mg BID SCARLETT CruzO.   40 mg at 07/28/24 1807    insulin regular (HumuLIN R,NovoLIN R) injection  1-6 Units 4X/DAY ACHS Eric Hines D.O.        And    dextrose 50% (D50W) injection 25 g  25 g Q15 MIN PRN Eric Hines D.O.        haloperidol lactate (Haldol) injection 1 mg  1 mg Q3HRS PRN SCARLETT CruzO.   1 mg at 07/27/24 2249    bisacodyl (Dulcolax) suppository 10 mg  10 mg DAILY SCARLETT CruzO.   10 mg at 07/28/24 0748   Last reviewed on 7/27/2024  9:03 PM by Rob Muñoz PharmD       Current Outpatient Medications:  Medications Prior to Admission   Medication Sig Dispense Refill Last Dose    acyclovir (ZOVIRAX) 400 MG tablet Take 400 mg by mouth 3 times a day as needed (Outbreak). Take for 5 days at first sign of cold sore.    at PRN    apixaban (ELIQUIS) 2.5mg Tab Take 1 Tablet by mouth 2 times a day. 200 Tablet 3 7/25/2024 at PM    flecainide (TAMBOCOR) 100 MG Tab Take 1 Tablet by mouth 2 times a day. 200 Tablet 3 7/25/2024 at PM    metoprolol SR (TOPROL XL) 100 MG TABLET SR 24 HR Take 1 Tablet by mouth every day. 90 Tablet 3 7/25/2024 at AM    Evolocumab (REPATHA) 140 MG/ML  Solution Auto-injector SubQ injection pen Inject 1 mL under the skin every 14 days. 6 mL 3 7/21/2024    clonazePAM (KLONOPIN) 0.5 MG Tab TAKE ONE TABLET BY MOUTH NIGHTLY AT BEDTIME AS NEEDED FOR INSOMNIA FOR 30 DAYS   7/25/2024 at PM    estrogens, conjugated (PREMARIN) 0.3 MG Tab Take 0.3 mg by mouth every day.   7/25/2024 at AM         Medication Allergies:  Allergies   Allergen Reactions    Blue Dyes Vomiting    Green Dyes Vomiting    Iodine Hives    Iodine Solution [Povidone Iodine] Unspecified     Red spots, pain    Omeprazole Unspecified     Urinary incontinence     Tape Unspecified     Welts on skin, paper tape is ok         Family Medical History:  History reviewed. No pertinent family history.      Social History:  Social History     Socioeconomic History    Marital status:      Spouse name: Not on file    Number of children: Not on file    Years of education: Not on file    Highest education level: Not on file   Occupational History    Not on file   Tobacco Use    Smoking status: Never    Smokeless tobacco: Never   Vaping Use    Vaping status: Never Used   Substance and Sexual Activity    Alcohol use: Not Currently     Comment: very rare glass of wine    Drug use: Not Currently    Sexual activity: Not on file   Other Topics Concern    Not on file   Social History Narrative    Not on file     Social Determinants of Health     Financial Resource Strain: Not on file   Food Insecurity: No Food Insecurity (7/27/2024)    Hunger Vital Sign     Worried About Running Out of Food in the Last Year: Never true     Ran Out of Food in the Last Year: Never true   Transportation Needs: No Transportation Needs (7/27/2024)    PRAPARE - Transportation     Lack of Transportation (Medical): No     Lack of Transportation (Non-Medical): No   Physical Activity: Not on file   Stress: Not on file   Social Connections: Not on file   Intimate Partner Violence: Not At Risk (7/27/2024)    Humiliation, Afraid, Rape, and Kick  "questionnaire     Fear of Current or Ex-Partner: No     Emotionally Abused: No     Physically Abused: No     Sexually Abused: No   Housing Stability: Low Risk  (7/27/2024)    Housing Stability Vital Sign     Unable to Pay for Housing in the Last Year: No     Number of Places Lived in the Last Year: 0     Unstable Housing in the Last Year: No         Vital signs:  Weight/BMI: Body mass index is 21.82 kg/m².  BP (!) 86/53   Pulse (!) 110   Temp 37.2 °C (99 °F) (Temporal)   Resp 19   Ht 1.702 m (5' 7.01\")   Wt 63.2 kg (139 lb 5.3 oz)   SpO2 95%   Vitals:    07/29/24 0915 07/29/24 0920 07/29/24 1000 07/29/24 1100   BP: 107/57  112/57 (!) 86/53   Pulse: 95 94 (!) 102 (!) 110   Resp: (!) 24 (!) 22 (!) 29 19   Temp:       TempSrc:       SpO2: 89% 89% 93% 95%   Weight:       Height:         Oxygen Therapy:  Pulse Oximetry: 95 %, O2 (LPM): 30, FiO2%: 100 %, O2 Delivery Device: Heated High Flow Nasal Cannula    Intake/Output Summary (Last 24 hours) at 7/29/2024 1138  Last data filed at 7/29/2024 0900  Gross per 24 hour   Intake 3998.24 ml   Output --   Net 3998.24 ml         Physical Exam:  Physical Exam  Vitals and nursing note reviewed.   Constitutional:       Appearance: She is ill-appearing.   HENT:      Head: Atraumatic.      Nose: Nose normal.      Mouth/Throat:      Mouth: Mucous membranes are dry.      Pharynx: Oropharynx is clear.   Eyes:      General: No scleral icterus.     Pupils: Pupils are equal, round, and reactive to light.   Cardiovascular:      Rate and Rhythm: Regular rhythm. Tachycardia present.      Pulses: Normal pulses.      Heart sounds: Normal heart sounds.   Pulmonary:      Breath sounds: Normal breath sounds.      Comments: Mechanical ventilation  Abdominal:      General: There is distension.   Musculoskeletal:      Cervical back: Neck supple.      Right lower leg: No edema.      Left lower leg: No edema.   Skin:     General: Skin is warm and dry.           Labs:  Recent Labs     " 07/27/24 1933 07/28/24 0025 07/29/24  0230   SODIUM 130* 130* 137  137   POTASSIUM 4.2 4.2 3.6  3.6   CHLORIDE 95* 97 101  101   CO2 16* 13* 16*  16*   BUN 17 16 13  13   CREATININE 0.76 0.78 0.98  0.98   MAGNESIUM  --   --  1.9   PHOSPHORUS  --   --  1.9*   CALCIUM 9.7 9.0 8.9  8.9     Recent Labs     07/27/24 1933 07/28/24 0025 07/29/24  0230   ALTSGPT 112* 96* 72*   ASTSGOT 100* 94* 63*   ALKPHOSPHAT 161* 148* 132*   TBILIRUBIN 0.6 0.5 0.5   GLUCOSE 124* 115* 109*  109*     Recent Labs     07/27/24 1933 07/28/24 0025 07/29/24  0230   WBC 7.7 8.9 8.3   NEUTSPOLYS 73.20*  --   --    LYMPHOCYTES 19.30*  --   --    MONOCYTES 6.50  --   --    EOSINOPHILS 0.40  --   --    BASOPHILS 0.30  --   --    ASTSGOT 100* 94* 63*   ALTSGPT 112* 96* 72*   ALKPHOSPHAT 161* 148* 132*   TBILIRUBIN 0.6 0.5 0.5     Recent Labs     07/27/24 1933 07/28/24 0025 07/28/24  1154 07/29/24  0230   RBC 4.62 4.50  --  4.06*   HEMOGLOBIN 14.5 14.1 13.5 12.6   HEMATOCRIT 41.3 41.9 38.7 36.8*   PLATELETCT 304 230  --  248   PROTHROMBTM 13.5  --   --   --    APTT 32.1  --   --   --    INR 0.98  --   --   --      Recent Results (from the past 24 hour(s))   HEMOGLOBIN AND HEMATOCRIT    Collection Time: 07/28/24 11:54 AM   Result Value Ref Range    Hemoglobin 13.5 12.0 - 16.0 g/dL    Hematocrit 38.7 37.0 - 47.0 %   POCT glucose device results    Collection Time: 07/28/24  6:05 PM   Result Value Ref Range    POC Glucose, Blood 128 (H) 65 - 99 mg/dL   POCT glucose device results    Collection Time: 07/28/24  9:17 PM   Result Value Ref Range    POC Glucose, Blood 106 (H) 65 - 99 mg/dL   CBC WITHOUT DIFFERENTIAL    Collection Time: 07/29/24  2:30 AM   Result Value Ref Range    WBC 8.3 4.8 - 10.8 K/uL    RBC 4.06 (L) 4.20 - 5.40 M/uL    Hemoglobin 12.6 12.0 - 16.0 g/dL    Hematocrit 36.8 (L) 37.0 - 47.0 %    MCV 90.6 81.4 - 97.8 fL    MCH 31.0 27.0 - 33.0 pg    MCHC 34.2 32.2 - 35.5 g/dL    RDW 41.7 35.9 - 50.0 fL    Platelet Count 248 164 -  446 K/uL    MPV 10.0 9.0 - 12.9 fL   Basic Metabolic Panel    Collection Time: 24  2:30 AM   Result Value Ref Range    Sodium 137 135 - 145 mmol/L    Potassium 3.6 3.6 - 5.5 mmol/L    Chloride 101 96 - 112 mmol/L    Co2 16 (L) 20 - 33 mmol/L    Glucose 109 (H) 65 - 99 mg/dL    Bun 13 8 - 22 mg/dL    Creatinine 0.98 0.50 - 1.40 mg/dL    Calcium 8.9 8.4 - 10.2 mg/dL    Anion Gap 20.0 (H) 7.0 - 16.0   ESTIMATED GFR    Collection Time: 24  2:30 AM   Result Value Ref Range    GFR (CKD-EPI) 57 (A) >60 mL/min/1.73 m 2   Comp Metabolic Panel    Collection Time: 24  2:30 AM   Result Value Ref Range    Sodium 137 135 - 145 mmol/L    Potassium 3.6 3.6 - 5.5 mmol/L    Chloride 101 96 - 112 mmol/L    Co2 16 (L) 20 - 33 mmol/L    Anion Gap 20.0 (H) 7.0 - 16.0    Glucose 109 (H) 65 - 99 mg/dL    Bun 13 8 - 22 mg/dL    Creatinine 0.98 0.50 - 1.40 mg/dL    Calcium 8.9 8.4 - 10.2 mg/dL    Correct Calcium 9.4 8.5 - 10.5 mg/dL    AST(SGOT) 63 (H) 12 - 45 U/L    ALT(SGPT) 72 (H) 2 - 50 U/L    Alkaline Phosphatase 132 (H) 30 - 99 U/L    Total Bilirubin 0.5 0.1 - 1.5 mg/dL    Albumin 3.4 3.2 - 4.9 g/dL    Total Protein 5.8 (L) 6.0 - 8.2 g/dL    Globulin 2.4 1.9 - 3.5 g/dL    A-G Ratio 1.4 g/dL   MAGNESIUM    Collection Time: 24  2:30 AM   Result Value Ref Range    Magnesium 1.9 1.5 - 2.5 mg/dL   PHOSPHORUS    Collection Time: 24  2:30 AM   Result Value Ref Range    Phosphorus 1.9 (L) 2.5 - 4.5 mg/dL   proBrain Natriuretic Peptide, NT    Collection Time: 24  2:30 AM   Result Value Ref Range    NT-proBNP 1816 (H) 0 - 125 pg/mL   EKG    Collection Time: 24  5:21 AM   Result Value Ref Range    Report       [facility]    Test Date:  2024  Pt Name:    LAST TAVERAS                  Department: [facility]  MRN:        [id number]                      Room:       [facility]  Gender:     Female                       Technician: [id number]  :        [id number]                   Requested By:EMILY Guerra  #:    [phone]                    Reading MD: Sherrie Killian    Measurements  Intervals                                Axis  Rate:       199                          P:          29  MN:         68                           QRS:        206  QRSD:       80                           T:          50  QT:         260  QTc:        473    Interpretive Statements  Artifacts in leads  Supraventricular tachycardia, 199 bpm  Early transition  Borderline low voltages in limb leads  ST changes likely rate related  Compared to ECG 05/06/2024 15:09:22  Rhythm change has occurred  Electronically Signed On 07- 05:42:23 PDT by Sherrie Killian     LACTIC ACID    Collection Time: 07/29/24  8:26 AM   Result Value Ref Range    Lactic Acid 1.4 0.5 - 2.0 mmol/L   POCT arterial blood gas device results    Collection Time: 07/29/24  9:46 AM   Result Value Ref Range    Ph 7.336 (L) 7.400 - 7.500    Pco2 23.7 (L) 26.0 - 37.0 mmHg    Po2 70 64 - 87 mmHg    Tco2 13 (L) 20 - 33 mmol/L    S02 93 93 - 99 %    Hco3 12.7 (L) 17.0 - 25.0 mmol/L    BE -11 (L) -4 - 3 mmol/L    Body Temp 99.0 F degrees    O2 Therapy 100 %    iPF Ratio 70     Ph Temp Soha 7.333 (L) 7.400 - 7.500    Po2 Temp Cor 71 64 - 87 mmHg    Specimen Arterial     Az Test Pass     DelSys Other     LPM 15 lpm   POCT lactate device results    Collection Time: 07/29/24  9:46 AM   Result Value Ref Range    iStat Lactate 0.6 0.5 - 2.0 mmol/L         Radiology Review:  DX-CHEST-LIMITED (1 VIEW)   Final Result         New, extensive bilateral interstitial infiltrates may be due to pulmonary edema and/or infection.      [id number] VIEW   Final Result      Findings suggesting fecal impaction and urinary bladder distention.      CT-ABDOMEN-PELVIS WITH   Final Result         1.  Changes compatible with constipation with large volume of stool in the rectal vault with rectal wall thickening and adjacent hazy fat stranding, appearance favoring rectal fecal impaction with probable component of  proctocolitis.   2.  Intrahepatic and extrahepatic biliary ductal dilatation, commonly associated with postcholecystectomy physiology, consider causes of biliary obstruction with additional workup as clinically appropriate   3.  Fluid-filled prominence of small bowel suggesting ileus and/or enteritis.   4.  Diverticulosis      EC-ECHOCARDIOGRAM COMPLETE W/O CONT    (Results Pending)   DX-CHEST-PORTABLE (1 VIEW)    (Results Pending)         MDM (Data Review):   -Records reviewed and summarized in current documentation  -I personally reviewed and interpreted the laboratory results  -I personally reviewed the radiology images        Medical Decision Making, by Problem:  Active Hospital Problems    Diagnosis     SVT (supraventricular tachycardia) (HCC) [I47.10]     Acute GI bleeding [K92.2]     High anion gap metabolic acidosis [E87.29]     Hyponatremia [E87.1]     Hyperglycemia [R73.9]     Elevated liver enzymes [R74.8]     Ileus (HCC) [K56.7]     Mild late onset Alzheimer's dementia without behavioral disturbance, psychotic disturbance, mood disturbance, or anxiety (HCC) [G30.1, F02.A0]     History of atrial fibrillation [Z86.79]            Assessment/Recommendations:  Assessment:  -Severe fecal impaction and constipation resistant to maximal enema and laxative therapy  -Rectal pain secondary to stercoral colitis likely  -SVT- Possibly secondary to respiratory distress and.or vagal stimulation  -Pulmonary edema  -Acute hypoxemic respiratory failure with intubation  -Fluid overload    Plan:  -Bedside rectal exam under sedation and manual disimpaction with Dr. An at about 12:15 PM  -OG to lower intermittent suction  -Appreciate ICU management    HAO Greenwood      Thank you for inviting me to participate in the care of this patient. Please do not hesitate to call GI consultants with additional questions/concerns or changes in the patient's clinical status at [phone].      Core Quality Measures    Reviewed items:  Labs, Medications and Radiology reports reviewed

## 2024-07-29 NOTE — PROGRESS NOTES
[facility] Medicine Daily Progress Note    Date of Service  7/29/2024    Chief Complaint  Gloria Herndon is a 83 y.o. female admitted 7/27/2024 with ileus    Hospital Course  No notes on file    Interval Problem Update  I was contacted by the bedside RN secondary to SVT.  Vagal maneuvers were performed with no improvement.  Adenosine 6 then 12 was given with no resolution.  Patient was transferred to the ICU        Consultants/Specialty  None    Code Status  Full Code    Disposition  The patient is not medically cleared for discharge to home or a post-acute facility.  Anticipate discharge to: home with close outpatient follow-up    I have placed the appropriate orders for post-discharge needs.    Review of Systems  Review of Systems   Unable to perform ROS: Dementia        Physical Exam  Temp:  [36.9 °C (98.4 °F)-37.1 °C (98.8 °F)] 37.1 °C (98.8 °F)  Pulse:  [78-90] 80  Resp:  [16-20] 17  BP: ()/(57-65) 126/64  SpO2:  [92 %-96 %] 92 %    Physical Exam  Vitals and nursing note reviewed.   Constitutional:       General: She is not in acute distress.     Appearance: She is well-developed. She is not diaphoretic.   HENT:      Head: Normocephalic and atraumatic.      Right Ear: External ear normal.      Left Ear: External ear normal.      Nose: Nose normal. No congestion or rhinorrhea.      Mouth/Throat:      Mouth: Mucous membranes are dry.      Pharynx: No oropharyngeal exudate.   Eyes:      General:         Right eye: No discharge.         Left eye: No discharge.   Neck:      Trachea: No tracheal deviation.   Cardiovascular:      Rate and Rhythm: Tachycardia present. Rhythm irregular.   Pulmonary:      Effort: Pulmonary effort is normal. No respiratory distress.   Abdominal:      General: Abdomen is flat. There is no distension.      Palpations: Abdomen is soft.   Musculoskeletal:      Cervical back: Neck supple.      Right lower leg: No edema.      Left lower leg: No edema.   Lymphadenopathy:       Cervical: No cervical adenopathy.   Skin:     General: Skin is warm and dry.      Findings: No erythema or rash.   Neurological:      Mental Status: She is alert.      Comments: Awake, confused   Psychiatric:         Cognition and Memory: Cognition is impaired. Memory is impaired.         Fluids    Intake/Output Summary (Last 24 hours) at 7/29/2024 0548  Last data filed at 7/28/2024 1150  Gross per 24 hour   Intake 1000 ml   Output 0 ml   Net 1000 ml       Laboratory  Recent Labs     07/27/24 1933 07/28/24  0025 07/28/24  1154 07/29/24  0230   WBC 7.7 8.9  --  8.3   RBC 4.62 4.50  --  4.06*   HEMOGLOBIN 14.5 14.1 13.5 12.6   HEMATOCRIT 41.3 41.9 38.7 36.8*   MCV 89.4 93.1  --  90.6   MCH 31.4 31.3  --  31.0   MCHC 35.1 33.7  --  34.2   RDW 39.6 42.3  --  41.7   PLATELETCT 304 230  --  248   MPV 9.9 10.2  --  10.0     Recent Labs     07/27/24 1933 07/28/24  0025 07/29/24  0230   SODIUM 130* 130* 137   POTASSIUM 4.2 4.2 3.6   CHLORIDE 95* 97 101   CO2 16* 13* 16*   GLUCOSE 124* 115* 109*   BUN 17 16 13   CREATININE 0.76 0.78 0.98   CALCIUM 9.7 9.0 8.9     Recent Labs     07/27/24 1933   APTT 32.1   INR 0.98               Imaging  CT-ABDOMEN-PELVIS WITH   Final Result         1.  Changes compatible with constipation with large volume of stool in the rectal vault with rectal wall thickening and adjacent hazy fat stranding, appearance favoring rectal fecal impaction with probable component of proctocolitis.   2.  Intrahepatic and extrahepatic biliary ductal dilatation, commonly associated with postcholecystectomy physiology, consider causes of biliary obstruction with additional workup as clinically appropriate   3.  Fluid-filled prominence of small bowel suggesting ileus and/or enteritis.   4.  Diverticulosis           Assessment/Plan  * Ileus (HCC)- (present on admission)  Assessment & Plan  Significant constipation with a large volume of stool in the rectal vault, likely causing her pain  Large volume of stool  likely causing her proctocolitis  Proctocolitis potentially causing bleeding  Staff has attempted suppository, enema as well as disimpaction, disimpaction was unsuccessful due to severe pain  Patient is also received GoLytely with no change  Continue medical management    SVT (supraventricular tachycardia) (HCC)  Assessment & Plan  New onset earlier this morning  No improvement with vagal maneuvers  Minimal change at all with adenosine at 6, adenosine 12 did cause flatlined for short amount of time then sinus rhythm however she went right back into SVT  Because of this, I transitioned her to the ICU with diltiazem drip  Patient had good blood pressure and oxygen saturation while on the floor  When she arrived to the ICU her blood pressure softened a little bit so I have placed a as needed bolus  Patient has been receiving IV fluids, was dehydrated leading to high anion gap metabolic acidosis but this has improved  Continue ongoing IV fluids    Patient is critically ill.   The patient continues to have : SVT  The vital organ system that is effected is the: Cardiac initially but ultimately all are at risk   If untreated there is a high chance of deterioration into: Cardiogenic shock  The critical care that I am providing today is: Diltiazem drip  The critical care that has been undertaken is medically complex.   There has been no overlap in critical care time.  Critical care time not including procedures, no overlap: 44 minutes    High anion gap metabolic acidosis- (present on admission)  Assessment & Plan  Likely due to decreased oral intake due to constipation  Patient will unlikely be taking in a significant amount orally  IVFs for today  Bicarb started    Acute GI bleeding- (present on admission)  Assessment & Plan  I believe patient likely has hemorrhoids  I discussed w/ nursing stools are brown, has bright blood on toilet paper  Pt is not anemic  Eliquis on hold still  H/H BID today    Elevated liver enzymes-  (present on admission)  Assessment & Plan  Mild, repeat CMP in the morning    Hyperglycemia- (present on admission)  Assessment & Plan  Mild however we will start insulin sliding scale to closely monitor for potential worsening    Hyponatremia- (present on admission)  Assessment & Plan  Due to dehydration  Start IV fluids  Repeat BMP in the morning    Mild late onset Alzheimer's dementia without behavioral disturbance, psychotic disturbance, mood disturbance, or anxiety (HCC)- (present on admission)  Assessment & Plan  Likely at baseline  Continue home as needed nightly clonazepam  If she develops significant agitation, will trial as needed Haldol   is concerned about her trying to get up, fall precautions in place    History of atrial fibrillation- (present on admission)  Assessment & Plan  Patient appears in sinus  Continue home flecainide and metoprolol  Hold home Eliquis         VTE prophylaxis: VTE Selection    I have performed a physical exam and reviewed and updated ROS and Plan today (7/29/2024). In review of yesterday's note (7/28/2024), there are no changes except as documented above.         84 y/o F with HTN, DM-2, TIA without residual deficits, no known CAD hx (stress test 11/2017 normal), recent admission to hospital for NSTEMI, s/p cardiac cath with multivessel CAD, course c/b fever and asymptomatic UTI (e.coli), discharged on lantus 5 units hs, presented with altered mental status, confusion, due to symptomatic hypoglycemia.

## 2024-07-29 NOTE — PROGRESS NOTES
0506- Received call from monitor tech that patient was showing SVT with a rate of 170-180 on monitor. Went to room to assess patient vitals taken and manually checked HR. /89 with HR of 154 and irregular. Patient still alert and oriented. Voalte to Dr. Hines at 0514. Dr showed up within minutes. Patient encouraged to bear down in attempt of vagal maneuver. HR dropped to 90's but returned to 180 plus within seconds. ICU and Tele nurses showed up to take over care of patient. Patient placed on 2 lpm NC and connected to monitor pads. Per Dr Hines ACLS protocols were followed. 6 mg of adenosine at 0537 with very minimal relief followed by 12 mg at 0541 with a little better response. Patient went right back into SVT with HR of 200. Dr Hines ordered patient transferred to ICU. Final vitals prior to transport 154/87  O2 98% on 2 lpm.

## 2024-07-30 ENCOUNTER — APPOINTMENT (OUTPATIENT)
Dept: RADIOLOGY | Facility: MEDICAL CENTER | Age: 84
DRG: 308 | End: 2024-07-30
Payer: MEDICARE

## 2024-07-30 ENCOUNTER — HOSPITAL ENCOUNTER (INPATIENT)
Facility: MEDICAL CENTER | Age: 84
End: 2024-07-30
Attending: INTERNAL MEDICINE | Admitting: INTERNAL MEDICINE
Payer: MEDICARE

## 2024-07-30 VITALS
DIASTOLIC BLOOD PRESSURE: 50 MMHG | TEMPERATURE: 100.4 F | WEIGHT: 141.54 LBS | BODY MASS INDEX: 22.21 KG/M2 | OXYGEN SATURATION: 96 % | HEART RATE: 56 BPM | SYSTOLIC BLOOD PRESSURE: 94 MMHG | RESPIRATION RATE: 22 BRPM | HEIGHT: 67 IN

## 2024-07-30 DIAGNOSIS — R57.9 SHOCK (HCC): ICD-10-CM

## 2024-07-30 DIAGNOSIS — I48.11 LONGSTANDING PERSISTENT ATRIAL FIBRILLATION (HCC): ICD-10-CM

## 2024-07-30 DIAGNOSIS — F02.A0 MILD LATE ONSET ALZHEIMER'S DEMENTIA WITHOUT BEHAVIORAL DISTURBANCE, PSYCHOTIC DISTURBANCE, MOOD DISTURBANCE, OR ANXIETY (HCC): ICD-10-CM

## 2024-07-30 DIAGNOSIS — G31.84 MILD COGNITIVE IMPAIRMENT: ICD-10-CM

## 2024-07-30 DIAGNOSIS — G30.1 MILD LATE ONSET ALZHEIMER'S DEMENTIA WITHOUT BEHAVIORAL DISTURBANCE, PSYCHOTIC DISTURBANCE, MOOD DISTURBANCE, OR ANXIETY (HCC): ICD-10-CM

## 2024-07-30 DIAGNOSIS — F51.01 PRIMARY INSOMNIA: ICD-10-CM

## 2024-07-30 DIAGNOSIS — K59.09 OTHER CONSTIPATION: ICD-10-CM

## 2024-07-30 DIAGNOSIS — G45.9 TIA (TRANSIENT ISCHEMIC ATTACK): ICD-10-CM

## 2024-07-30 PROBLEM — R00.1 BRADYCARDIA: Status: ACTIVE | Noted: 2024-07-30

## 2024-07-30 LAB
ALBUMIN SERPL BCP-MCNC: 2.8 G/DL (ref 3.2–4.9)
ALBUMIN SERPL BCP-MCNC: 3 G/DL (ref 3.2–4.9)
ALBUMIN/GLOB SERPL: 1.1 G/DL
ALBUMIN/GLOB SERPL: 1.5 G/DL
ALP SERPL-CCNC: 109 U/L (ref 30–99)
ALP SERPL-CCNC: 127 U/L (ref 30–99)
ALT SERPL-CCNC: 47 U/L (ref 2–50)
ALT SERPL-CCNC: 49 U/L (ref 2–50)
ANION GAP SERPL CALC-SCNC: 13 MMOL/L (ref 7–16)
ANION GAP SERPL CALC-SCNC: 8 MMOL/L (ref 7–16)
AST SERPL-CCNC: 31 U/L (ref 12–45)
AST SERPL-CCNC: 51 U/L (ref 12–45)
BASOPHILS # BLD AUTO: 0.3 % (ref 0–1.8)
BASOPHILS # BLD AUTO: 0.3 % (ref 0–1.8)
BASOPHILS # BLD: 0.02 K/UL (ref 0–0.12)
BASOPHILS # BLD: 0.02 K/UL (ref 0–0.12)
BILIRUB SERPL-MCNC: 0.5 MG/DL (ref 0.1–1.5)
BILIRUB SERPL-MCNC: 0.5 MG/DL (ref 0.1–1.5)
BUN SERPL-MCNC: 10 MG/DL (ref 8–22)
BUN SERPL-MCNC: 9 MG/DL (ref 8–22)
CA-I SERPL-SCNC: 1.1 MMOL/L (ref 1.1–1.3)
CALCIUM ALBUM COR SERPL-MCNC: 9.4 MG/DL (ref 8.5–10.5)
CALCIUM ALBUM COR SERPL-MCNC: 9.6 MG/DL (ref 8.5–10.5)
CALCIUM SERPL-MCNC: 8.6 MG/DL (ref 8.4–10.2)
CALCIUM SERPL-MCNC: 8.6 MG/DL (ref 8.5–10.5)
CHLORIDE SERPL-SCNC: 103 MMOL/L (ref 96–112)
CHLORIDE SERPL-SCNC: 103 MMOL/L (ref 96–112)
CO2 SERPL-SCNC: 21 MMOL/L (ref 20–33)
CO2 SERPL-SCNC: 26 MMOL/L (ref 20–33)
CREAT SERPL-MCNC: 0.6 MG/DL (ref 0.5–1.4)
CREAT SERPL-MCNC: 0.66 MG/DL (ref 0.5–1.4)
EKG IMPRESSION: NORMAL
EKG IMPRESSION: NORMAL
EOSINOPHIL # BLD AUTO: 0.1 K/UL (ref 0–0.51)
EOSINOPHIL # BLD AUTO: 0.12 K/UL (ref 0–0.51)
EOSINOPHIL NFR BLD: 1.4 % (ref 0–6.9)
EOSINOPHIL NFR BLD: 1.7 % (ref 0–6.9)
ERYTHROCYTE [DISTWIDTH] IN BLOOD BY AUTOMATED COUNT: 42.8 FL (ref 35.9–50)
ERYTHROCYTE [DISTWIDTH] IN BLOOD BY AUTOMATED COUNT: 44.4 FL (ref 35.9–50)
GFR SERPLBLD CREATININE-BSD FMLA CKD-EPI: 86 ML/MIN/1.73 M 2
GFR SERPLBLD CREATININE-BSD FMLA CKD-EPI: 88 ML/MIN/1.73 M 2
GLOBULIN SER CALC-MCNC: 2 G/DL (ref 1.9–3.5)
GLOBULIN SER CALC-MCNC: 2.6 G/DL (ref 1.9–3.5)
GLUCOSE BLD STRIP.AUTO-MCNC: 136 MG/DL (ref 65–99)
GLUCOSE SERPL-MCNC: 129 MG/DL (ref 65–99)
GLUCOSE SERPL-MCNC: 133 MG/DL (ref 65–99)
HCT VFR BLD AUTO: 32.1 % (ref 37–47)
HCT VFR BLD AUTO: 32.3 % (ref 37–47)
HGB BLD-MCNC: 11.1 G/DL (ref 12–16)
HGB BLD-MCNC: 11.4 G/DL (ref 12–16)
IMM GRANULOCYTES # BLD AUTO: 0.02 K/UL (ref 0–0.11)
IMM GRANULOCYTES # BLD AUTO: 0.02 K/UL (ref 0–0.11)
IMM GRANULOCYTES NFR BLD AUTO: 0.3 % (ref 0–0.9)
IMM GRANULOCYTES NFR BLD AUTO: 0.3 % (ref 0–0.9)
INR PPP: 1.1 (ref 0.87–1.13)
LACTATE SERPL-SCNC: 1.3 MMOL/L (ref 0.5–2)
LYMPHOCYTES # BLD AUTO: 1.11 K/UL (ref 1–4.8)
LYMPHOCYTES # BLD AUTO: 1.43 K/UL (ref 1–4.8)
LYMPHOCYTES NFR BLD: 16 % (ref 22–41)
LYMPHOCYTES NFR BLD: 20.8 % (ref 22–41)
MAGNESIUM SERPL-MCNC: 1.6 MG/DL (ref 1.5–2.5)
MAGNESIUM SERPL-MCNC: 2.6 MG/DL (ref 1.5–2.5)
MCH RBC QN AUTO: 31.4 PG (ref 27–33)
MCH RBC QN AUTO: 31.7 PG (ref 27–33)
MCHC RBC AUTO-ENTMCNC: 34.4 G/DL (ref 32.2–35.5)
MCHC RBC AUTO-ENTMCNC: 35.5 G/DL (ref 32.2–35.5)
MCV RBC AUTO: 89.2 FL (ref 81.4–97.8)
MCV RBC AUTO: 91.5 FL (ref 81.4–97.8)
MONOCYTES # BLD AUTO: 0.44 K/UL (ref 0–0.85)
MONOCYTES # BLD AUTO: 0.46 K/UL (ref 0–0.85)
MONOCYTES NFR BLD AUTO: 6.4 % (ref 0–13.4)
MONOCYTES NFR BLD AUTO: 6.6 % (ref 0–13.4)
NEUTROPHILS # BLD AUTO: 4.84 K/UL (ref 1.82–7.42)
NEUTROPHILS # BLD AUTO: 5.24 K/UL (ref 1.82–7.42)
NEUTROPHILS NFR BLD: 70.5 % (ref 44–72)
NEUTROPHILS NFR BLD: 75.4 % (ref 44–72)
NRBC # BLD AUTO: 0 K/UL
NRBC # BLD AUTO: 0 K/UL
NRBC BLD-RTO: 0 /100 WBC (ref 0–0.2)
NRBC BLD-RTO: 0 /100 WBC (ref 0–0.2)
PHOSPHATE SERPL-MCNC: 1.1 MG/DL (ref 2.5–4.5)
PLATELET # BLD AUTO: 227 K/UL (ref 164–446)
PLATELET # BLD AUTO: 230 K/UL (ref 164–446)
PMV BLD AUTO: 10.1 FL (ref 9–12.9)
PMV BLD AUTO: 9.8 FL (ref 9–12.9)
POTASSIUM SERPL-SCNC: 3.2 MMOL/L (ref 3.6–5.5)
POTASSIUM SERPL-SCNC: 3.6 MMOL/L (ref 3.6–5.5)
PROT SERPL-MCNC: 5 G/DL (ref 6–8.2)
PROT SERPL-MCNC: 5.4 G/DL (ref 6–8.2)
PROTHROMBIN TIME: 14.3 SEC (ref 12–14.6)
RBC # BLD AUTO: 3.53 M/UL (ref 4.2–5.4)
RBC # BLD AUTO: 3.6 M/UL (ref 4.2–5.4)
SODIUM SERPL-SCNC: 137 MMOL/L (ref 135–145)
SODIUM SERPL-SCNC: 137 MMOL/L (ref 135–145)
TROPONIN T SERPL-MCNC: 16 NG/L (ref 6–19)
TSH SERPL DL<=0.005 MIU/L-ACNC: 3.58 UIU/ML (ref 0.38–5.33)
WBC # BLD AUTO: 6.9 K/UL (ref 4.8–10.8)
WBC # BLD AUTO: 7 K/UL (ref 4.8–10.8)

## 2024-07-30 PROCEDURE — 700101 HCHG RX REV CODE 250: Performed by: STUDENT IN AN ORGANIZED HEALTH CARE EDUCATION/TRAINING PROGRAM

## 2024-07-30 PROCEDURE — 700101 HCHG RX REV CODE 250: Performed by: INTERNAL MEDICINE

## 2024-07-30 PROCEDURE — 84484 ASSAY OF TROPONIN QUANT: CPT

## 2024-07-30 PROCEDURE — 700102 HCHG RX REV CODE 250 W/ 637 OVERRIDE(OP)

## 2024-07-30 PROCEDURE — 99291 CRITICAL CARE FIRST HOUR: CPT | Performed by: INTERNAL MEDICINE

## 2024-07-30 PROCEDURE — 93005 ELECTROCARDIOGRAM TRACING: CPT | Performed by: INTERNAL MEDICINE

## 2024-07-30 PROCEDURE — 80053 COMPREHEN METABOLIC PANEL: CPT

## 2024-07-30 PROCEDURE — 93005 ELECTROCARDIOGRAM TRACING: CPT | Performed by: STUDENT IN AN ORGANIZED HEALTH CARE EDUCATION/TRAINING PROGRAM

## 2024-07-30 PROCEDURE — 99292 CRITICAL CARE ADDL 30 MIN: CPT | Mod: GC | Performed by: INTERNAL MEDICINE

## 2024-07-30 PROCEDURE — 770022 HCHG ROOM/CARE - ICU (200)

## 2024-07-30 PROCEDURE — 700111 HCHG RX REV CODE 636 W/ 250 OVERRIDE (IP): Performed by: INTERNAL MEDICINE

## 2024-07-30 PROCEDURE — 94760 N-INVAS EAR/PLS OXIMETRY 1: CPT

## 2024-07-30 PROCEDURE — 84100 ASSAY OF PHOSPHORUS: CPT

## 2024-07-30 PROCEDURE — 85025 COMPLETE CBC W/AUTO DIFF WBC: CPT

## 2024-07-30 PROCEDURE — 83735 ASSAY OF MAGNESIUM: CPT

## 2024-07-30 PROCEDURE — 84443 ASSAY THYROID STIM HORMONE: CPT

## 2024-07-30 PROCEDURE — 700105 HCHG RX REV CODE 258: Performed by: INTERNAL MEDICINE

## 2024-07-30 PROCEDURE — 83605 ASSAY OF LACTIC ACID: CPT

## 2024-07-30 PROCEDURE — 700111 HCHG RX REV CODE 636 W/ 250 OVERRIDE (IP): Performed by: STUDENT IN AN ORGANIZED HEALTH CARE EDUCATION/TRAINING PROGRAM

## 2024-07-30 PROCEDURE — 700105 HCHG RX REV CODE 258: Performed by: STUDENT IN AN ORGANIZED HEALTH CARE EDUCATION/TRAINING PROGRAM

## 2024-07-30 PROCEDURE — 85025 COMPLETE CBC W/AUTO DIFF WBC: CPT | Mod: 91

## 2024-07-30 PROCEDURE — 700102 HCHG RX REV CODE 250 W/ 637 OVERRIDE(OP): Performed by: INTERNAL MEDICINE

## 2024-07-30 PROCEDURE — A9270 NON-COVERED ITEM OR SERVICE: HCPCS | Performed by: INTERNAL MEDICINE

## 2024-07-30 PROCEDURE — 82962 GLUCOSE BLOOD TEST: CPT

## 2024-07-30 PROCEDURE — 93010 ELECTROCARDIOGRAM REPORT: CPT | Performed by: STUDENT IN AN ORGANIZED HEALTH CARE EDUCATION/TRAINING PROGRAM

## 2024-07-30 PROCEDURE — 83735 ASSAY OF MAGNESIUM: CPT | Mod: 91

## 2024-07-30 PROCEDURE — 80053 COMPREHEN METABOLIC PANEL: CPT | Mod: 91

## 2024-07-30 PROCEDURE — 85610 PROTHROMBIN TIME: CPT

## 2024-07-30 PROCEDURE — 307059 PAD,EAR PROTECTOR: Performed by: INTERNAL MEDICINE

## 2024-07-30 PROCEDURE — 99291 CRITICAL CARE FIRST HOUR: CPT | Performed by: STUDENT IN AN ORGANIZED HEALTH CARE EDUCATION/TRAINING PROGRAM

## 2024-07-30 PROCEDURE — A9270 NON-COVERED ITEM OR SERVICE: HCPCS

## 2024-07-30 PROCEDURE — 82330 ASSAY OF CALCIUM: CPT

## 2024-07-30 RX ORDER — NOREPINEPHRINE BITARTRATE 0.03 MG/ML
0-1 INJECTION, SOLUTION INTRAVENOUS CONTINUOUS
Status: ON HOLD
Start: 2024-07-30 | End: 2024-07-30

## 2024-07-30 RX ORDER — ATROPINE SULFATE 1 MG/ML
1 INJECTION, SOLUTION INTRAVENOUS ONCE
Status: COMPLETED | OUTPATIENT
Start: 2024-07-30 | End: 2024-07-30

## 2024-07-30 RX ORDER — ENOXAPARIN SODIUM 100 MG/ML
40 INJECTION SUBCUTANEOUS DAILY
Status: DISCONTINUED | OUTPATIENT
Start: 2024-07-30 | End: 2024-07-30 | Stop reason: HOSPADM

## 2024-07-30 RX ORDER — ENOXAPARIN SODIUM 100 MG/ML
40 INJECTION SUBCUTANEOUS DAILY
Status: ON HOLD | DISCHARGE
Start: 2024-07-31 | End: 2024-08-03

## 2024-07-30 RX ORDER — CEPHALEXIN 500 MG/1
500 CAPSULE ORAL 2 TIMES DAILY
Status: ON HOLD | COMMUNITY
Start: 2024-07-08 | End: 2024-08-03

## 2024-07-30 RX ORDER — POLYETHYLENE GLYCOL 3350 17 G/17G
1 POWDER, FOR SOLUTION ORAL 3 TIMES DAILY
Status: DISCONTINUED | OUTPATIENT
Start: 2024-07-30 | End: 2024-08-04 | Stop reason: HOSPADM

## 2024-07-30 RX ORDER — MAGNESIUM SULFATE HEPTAHYDRATE 40 MG/ML
4 INJECTION, SOLUTION INTRAVENOUS ONCE
Status: COMPLETED | OUTPATIENT
Start: 2024-07-30 | End: 2024-07-30

## 2024-07-30 RX ORDER — NOREPINEPHRINE BITARTRATE 0.03 MG/ML
INJECTION, SOLUTION INTRAVENOUS
Status: ACTIVE
Start: 2024-07-30 | End: 2024-07-31

## 2024-07-30 RX ORDER — BISACODYL 10 MG
10 SUPPOSITORY, RECTAL RECTAL DAILY
Status: ON HOLD
Start: 2024-07-31 | End: 2024-08-04

## 2024-07-30 RX ORDER — NOREPINEPHRINE BITARTRATE 0.03 MG/ML
0-1 INJECTION, SOLUTION INTRAVENOUS CONTINUOUS
Status: DISCONTINUED | OUTPATIENT
Start: 2024-07-30 | End: 2024-07-31

## 2024-07-30 RX ORDER — FLECAINIDE ACETATE 100 MG/1
100 TABLET ORAL 2 TIMES DAILY
Status: DISCONTINUED | OUTPATIENT
Start: 2024-07-30 | End: 2024-08-02

## 2024-07-30 RX ORDER — METOPROLOL SUCCINATE 100 MG/1
100 TABLET, EXTENDED RELEASE ORAL DAILY
Status: DISCONTINUED | OUTPATIENT
Start: 2024-07-31 | End: 2024-08-02

## 2024-07-30 RX ORDER — HYDROCORTISONE 25 MG/G
1 OINTMENT TOPICAL NIGHTLY PRN
COMMUNITY

## 2024-07-30 RX ORDER — ACETAMINOPHEN 325 MG/1
650 TABLET ORAL EVERY 4 HOURS PRN
Status: DISCONTINUED | OUTPATIENT
Start: 2024-07-30 | End: 2024-08-04 | Stop reason: HOSPADM

## 2024-07-30 RX ADMIN — HYDROCORTISONE: 1 CREAM TOPICAL at 06:34

## 2024-07-30 RX ADMIN — ATROPINE SULFATE 1 MG: 1 INJECTION, SOLUTION INTRAMUSCULAR; INTRAVENOUS; SUBCUTANEOUS at 02:00

## 2024-07-30 RX ADMIN — HALOPERIDOL LACTATE 1 MG: 5 INJECTION, SOLUTION INTRAMUSCULAR at 01:01

## 2024-07-30 RX ADMIN — POLYETHYLENE GLYCOL 3350 1 PACKET: 17 POWDER, FOR SOLUTION ORAL at 17:48

## 2024-07-30 RX ADMIN — ENOXAPARIN SODIUM 40 MG: 100 INJECTION SUBCUTANEOUS at 11:57

## 2024-07-30 RX ADMIN — APIXABAN 2.5 MG: 2.5 TABLET, FILM COATED ORAL at 21:07

## 2024-07-30 RX ADMIN — POTASSIUM PHOSPHATE, MONOBASIC AND POTASSIUM PHOSPHATE, DIBASIC 30 MMOL: 224; 236 INJECTION, SOLUTION, CONCENTRATE INTRAVENOUS at 09:41

## 2024-07-30 RX ADMIN — MAGNESIUM SULFATE HEPTAHYDRATE 4 G: 4 INJECTION, SOLUTION INTRAVENOUS at 09:40

## 2024-07-30 RX ADMIN — ATROPINE SULFATE 1 MG: 1 INJECTION, SOLUTION INTRAMUSCULAR; INTRAVENOUS; SUBCUTANEOUS at 05:00

## 2024-07-30 RX ADMIN — ACETAMINOPHEN 650 MG: 325 TABLET ORAL at 21:07

## 2024-07-30 RX ADMIN — HALOPERIDOL LACTATE 1 MG: 5 INJECTION, SOLUTION INTRAMUSCULAR at 00:09

## 2024-07-30 RX ADMIN — PANTOPRAZOLE SODIUM 40 MG: 40 INJECTION, POWDER, FOR SOLUTION INTRAVENOUS at 05:53

## 2024-07-30 RX ADMIN — POLYETHYLENE GLYCOL 3350 1 PACKET: 17 POWDER, FOR SOLUTION ORAL at 11:58

## 2024-07-30 RX ADMIN — Medication 5 MG: at 21:07

## 2024-07-30 ASSESSMENT — FIBROSIS 4 INDEX
FIB4 SCORE: 1.67
FIB4 SCORE: 1.96

## 2024-07-30 ASSESSMENT — CHA2DS2 SCORE
HYPERTENSION: YES
CHA2DS2 VASC SCORE: 6
AGE 75 OR GREATER: YES
VASCULAR DISEASE: NO
PRIOR STROKE OR TIA OR THROMBOEMBOLISM: YES
SEX: FEMALE
AGE 65 TO 74: NO
DIABETES: NO
CHF OR LEFT VENTRICULAR DYSFUNCTION: NO

## 2024-07-30 ASSESSMENT — ENCOUNTER SYMPTOMS
FALLS: 0
EYE REDNESS: 0
ABDOMINAL PAIN: 0
DIARRHEA: 1
FEVER: 0
FOCAL WEAKNESS: 0
CONSTIPATION: 0
SHORTNESS OF BREATH: 0
VOMITING: 0

## 2024-07-30 ASSESSMENT — PAIN DESCRIPTION - PAIN TYPE
TYPE: ACUTE PAIN

## 2024-07-30 NOTE — CARE PLAN
The patient is Watcher - Medium risk of patient condition declining or worsening    Shift Goals  Clinical Goals: SBP>90, monitor vitals, safety  Patient Goals: feel better  Family Goals: for pt to get better, updates    Progress made toward(s) clinical / shift goals:      Problem: Pain - Standard  Goal: Alleviation of pain or a reduction in pain to the patient’s comfort goal  Outcome: Progressing     Problem: Skin Integrity  Goal: Skin integrity is maintained or improved  Outcome: Progressing     Problem: Fall Risk  Goal: Patient will remain free from falls  Outcome: Progressing     Problem: Bowel Elimination  Goal: Establish and maintain regular bowel function  Outcome: Progressing     Problem: Safety - Medical Restraint  Goal: Remains free of injury from restraints (Restraint for Interference with Medical Device)  Outcome: Progressing     Problem: Hemodynamics  Goal: Patient's hemodynamics, fluid balance and neurologic status will be stable or improve  Outcome: Progressing       Patient is not progressing towards the following goals:

## 2024-07-30 NOTE — PROGRESS NOTES
Gastroenterology Progress Note               Author:  HAO Greenwood Date & Time Created: 7/30/2024 1:35 PM       Patient ID:  Name:             Gloria Herndon  YOB: 1940  Age:                 83 y.o.  female  MRN:               [id number]      Referring Provider:  Dr. Tovar      Presenting Chief Complaint:  Fecal Impaction      History of Present Illness:    This is a very pleasant 83 y.o. female with the past medical history as listed below. She was admitted with abdominal pain and constipation. CT 7/27 demonstrated moderate constipation with fecal impaction and possible proctocolitis. She took golytely as recommended with no passage of stool and was also administered an enema yesterday. Today the patient's abdomen became more painful and distended. She developed SVT and was transferred to the ICU. She was found to have pulmonary edema possibly due to fluid overload. Repeat KUB this AM demonstrated severe fecal impaction and displacement of the bladder. The patient was intubated for oxygenation and airway in addition to possibly needing a more involved fecal impaction.      Interval history  Patient underwent disimpaction yesterday at bedside by Dr. Gupta. She was able to get one enema after with two large Bms. She had another small BM today. She is on mirlax TID now. Unfortunately developed bradycardia and pauses. She is being transferred to [facility] for a pacemaker.     Review of Systems:  Review of Systems   Unable to perform ROS: Medical condition             Past Medical History:  Past Medical History:   Diagnosis Date    Arrhythmia     Pt states she has Atrial Fib    Asthma     Atrial fibrillation (HCC)     High cholesterol     History of cardiac radiofrequency ablation 12/01/2017    TIA (transient ischemic attack) 10/2020     Active Hospital Problems    Diagnosis     Shock (HCC) [R57.9]     SVT (supraventricular tachycardia) (HCC) [I47.10]     Acute GI bleeding  [K92.2]     High anion gap metabolic acidosis [E87.29]     Hyponatremia [E87.1]     Hyperglycemia [R73.9]     Elevated liver enzymes [R74.8]     Ileus (HCC) [K56.7]     Mild late onset Alzheimer's dementia without behavioral disturbance, psychotic disturbance, mood disturbance, or anxiety (HCC) [G30.1, F02.A0]     History of atrial fibrillation [Z86.79]          Past Surgical History:  Past Surgical History:   Procedure Laterality Date    CHOLECYSTECTOMY  08/08/2003    OTHER Bilateral     Lens 2013    OTHER CARDIAC SURGERY      ablation           Hospital Medications:  Current Facility-Administered Medications   Medication Dose Frequency Provider Last Rate Last Admin    potassium phosphate IVPB 30 mmol in 500 mL D5W (premix)  30 mmol Once Jonelle Tovar M.D. 83.3 mL/hr at 07/30/24 1225 Rate Verify at 07/30/24 1225    magnesium sulfate IVPB premix 4 g  4 g Once JULIO CESAR AnguianoDChristina 25 mL/hr at 07/30/24 1225 Rate Verify at 07/30/24 1225    potassium phosphate IVPB 30 mmol in 500 mL D5W (premix)  30 mmol Once Jonelleclayton Tovar M.D.        enoxaparin (Lovenox) inj 40 mg  40 mg DAILY AT 1800 Jonelle JULIO CESAR TovarDChristina   40 mg at 07/30/24 1157    [Held by provider] flecainide (Tambocor) tablet 100 mg  100 mg BID Kylah Simms M.D.   100 mg at 07/29/24 1719    senna-docusate (Pericolace Or Senokot S) 8.6-50 MG per tablet 2 Tablet  2 Tablet Q EVENING Kylah Simms M.D.   2 Tablet at 07/29/24 1719    And    polyethylene glycol/lytes (Miralax) Packet 1 Packet  1 Packet 4X/DAY Kylah Simms M.D.   1 Packet at 07/30/24 1158    traMADol (Ultram) 50 MG tablet 50 mg  50 mg Q6HRS PRN Kylah Simms M.D.        ondansetron (Zofran ODT) dispertab 4 mg  4 mg Q4HRS PRN Kylah Simms M.D.        clonazePAM (KlonoPIN) tablet 0.5 mg  0.5 mg QHS PRN Kylah Simms M.D.        acetaminophen (Tylenol) tablet 650 mg  650 mg Q6HRS PRN Kylah Simms M.D.        norepinephrine (Levophed) 8 mg in 250 mL  NS infusion (premix)  0-1 mcg/kg/min (Ideal) Continuous Eric Hines D.O.   Paused at 07/30/24 1225    hydrocortisone 1 % cream   BID Kylah Simms M.D.   Given at 07/30/24 0634    [Held by provider] metoprolol SR (Toprol XL) tablet 100 mg  100 mg DAILY Kylah Simms M.D.   100 mg at 07/29/24 1719    labetalol (Normodyne/Trandate) injection 10 mg  10 mg Q4HRS PRN Eric Hines D.O.        ondansetron (Zofran) syringe/vial injection 4 mg  4 mg Q4HRS PRN Eric Hines D.O.        pantoprazole (Protonix) injection 40 mg  40 mg BID Eric Hines D.O.   40 mg at 07/30/24 0553    bisacodyl (Dulcolax) suppository 10 mg  10 mg DAILY SCARLETT CruzO.   10 mg at 07/28/24 0748   Last reviewed on 7/27/2024  9:03 PM by Rob Muñoz PharmD       Current Outpatient Medications:  Medications Prior to Admission   Medication Sig Dispense Refill Last Dose    acyclovir (ZOVIRAX) 400 MG tablet Take 400 mg by mouth 3 times a day as needed (Outbreak). Take for 5 days at first sign of cold sore.    at PRN    apixaban (ELIQUIS) 2.5mg Tab Take 1 Tablet by mouth 2 times a day. 200 Tablet 3 7/25/2024 at PM    flecainide (TAMBOCOR) 100 MG Tab Take 1 Tablet by mouth 2 times a day. 200 Tablet 3 7/25/2024 at PM    metoprolol SR (TOPROL XL) 100 MG TABLET SR 24 HR Take 1 Tablet by mouth every day. 90 Tablet 3 7/25/2024 at AM    Evolocumab (REPATHA) 140 MG/ML Solution Auto-injector SubQ injection pen Inject 1 mL under the skin every 14 days. 6 mL 3 7/21/2024    clonazePAM (KLONOPIN) 0.5 MG Tab TAKE ONE TABLET BY MOUTH NIGHTLY AT BEDTIME AS NEEDED FOR INSOMNIA FOR 30 DAYS   7/25/2024 at PM    estrogens, conjugated (PREMARIN) 0.3 MG Tab Take 0.3 mg by mouth every day.   7/25/2024 at AM         Medication Allergies:  Allergies   Allergen Reactions    Blue Dyes Vomiting    Green Dyes Vomiting    Iodine Hives    Iodine Solution [Povidone Iodine] Unspecified     Red spots, pain    Omeprazole Unspecified     Urinary  "incontinence     Tape Unspecified     Welts on skin, paper tape is ok         Family Medical History:  History reviewed. No pertinent family history.      Social History:  Social History     Socioeconomic History    Marital status:      Spouse name: Not on file    Number of children: Not on file    Years of education: Not on file    Highest education level: Not on file   Occupational History    Not on file   Tobacco Use    Smoking status: Never    Smokeless tobacco: Never   Vaping Use    Vaping status: Never Used   Substance and Sexual Activity    Alcohol use: Not Currently     Comment: very rare glass of wine    Drug use: Not Currently    Sexual activity: Not on file   Other Topics Concern    Not on file   Social History Narrative    Not on file     Social Determinants of Health     Financial Resource Strain: Not on file   Food Insecurity: No Food Insecurity (7/27/2024)    Hunger Vital Sign     Worried About Running Out of Food in the Last Year: Never true     Ran Out of Food in the Last Year: Never true   Transportation Needs: No Transportation Needs (7/27/2024)    PRAPARE - Transportation     Lack of Transportation (Medical): No     Lack of Transportation (Non-Medical): No   Physical Activity: Not on file   Stress: Not on file   Social Connections: Not on file   Intimate Partner Violence: Not At Risk (7/27/2024)    Humiliation, Afraid, Rape, and Kick questionnaire     Fear of Current or Ex-Partner: No     Emotionally Abused: No     Physically Abused: No     Sexually Abused: No   Housing Stability: Low Risk  (7/27/2024)    Housing Stability Vital Sign     Unable to Pay for Housing in the Last Year: No     Number of Places Lived in the Last Year: 0     Unstable Housing in the Last Year: No         Vital signs:  Weight/BMI: Body mass index is 22.16 kg/m².  BP (!) 84/47   Pulse (!) 58   Temp 38 °C (100.4 °F) (Bladder)   Resp 13   Ht 1.702 m (5' 7.01\")   Wt 64.2 kg (141 lb 8.6 oz)   SpO2 91%   Vitals:    " 07/30/24 1200 07/30/24 1215 07/30/24 1230 07/30/24 1245   BP: 97/50 (!) 86/48 (!) 89/51 (!) 84/47   Pulse: 60 65 (!) 59 (!) 58   Resp: 16 (!) 32 18 13   Temp:       TempSrc: Bladder      SpO2: 94% 89% 91% 91%   Weight:       Height:         Oxygen Therapy:  Pulse Oximetry: 91 %, O2 (LPM): 2, FiO2%: 60 %, O2 Delivery Device: Silicone Nasal Cannula    Intake/Output Summary (Last 24 hours) at 7/30/2024 1335  Last data filed at 7/30/2024 1225  Gross per 24 hour   Intake 584.71 ml   Output 1703 ml   Net -1118.29 ml         Physical Exam:  Physical Exam  Vitals and nursing note reviewed.   Constitutional:       Appearance: She is ill-appearing.   HENT:      Head: Atraumatic.      Nose: Nose normal.      Mouth/Throat:      Mouth: Mucous membranes are dry.      Pharynx: Oropharynx is clear.   Eyes:      General: No scleral icterus.     Pupils: Pupils are equal, round, and reactive to light.   Cardiovascular:      Rate and Rhythm: Regular rhythm. Bradycardia present.      Pulses: Normal pulses.      Heart sounds: Normal heart sounds.   Pulmonary:      Breath sounds: Normal breath sounds.   Abdominal:      General: Bowel sounds are normal.      Palpations: Abdomen is soft.   Musculoskeletal:      Cervical back: Neck supple.      Right lower leg: No edema.      Left lower leg: No edema.   Skin:     General: Skin is warm and dry.           Labs:  Recent Labs     07/29/24 0230 07/29/24 1940 07/30/24  0805   SODIUM 137  137 135 137   POTASSIUM 3.6  3.6 3.2* 3.2*   CHLORIDE 101  101 100 103   CO2 16*  16* 22 26   BUN 13  13 11 10   CREATININE 0.98  0.98 0.85 0.66   MAGNESIUM 1.9 1.6 1.6   PHOSPHORUS 1.9* 1.9* 1.1*   CALCIUM 8.9  8.9 8.2* 8.6     Recent Labs     07/29/24 0230 07/29/24 1940 07/30/24  0805   ALTSGPT 72* 54* 47   ASTSGOT 63* 39 31   ALKPHOSPHAT 132* 111* 109*   TBILIRUBIN 0.5 0.4 0.5   GLUCOSE 109*  109* 168* 133*     Recent Labs     07/27/24  1933 07/28/24  0025 07/29/24  0230 07/29/24 1940  07/30/24  0805   WBC 7.7   < > 8.3 6.9 7.0   NEUTSPOLYS 73.20*  --   --   --  75.40*   LYMPHOCYTES 19.30*  --   --   --  16.00*   MONOCYTES 6.50  --   --   --  6.60   EOSINOPHILS 0.40  --   --   --  1.40   BASOPHILS 0.30  --   --   --  0.30   ASTSGOT 100*   < > 63* 39 31   ALTSGPT 112*   < > 72* 54* 47   ALKPHOSPHAT 161*   < > 132* 111* 109*   TBILIRUBIN 0.6   < > 0.5 0.4 0.5    < > = values in this interval not displayed.     Recent Labs     07/27/24  1933 07/28/24  0025 07/29/24  0230 07/29/24  1940 07/30/24  0805   RBC 4.62   < > 4.06* 3.57* 3.60*   HEMOGLOBIN 14.5   < > 12.6 11.3* 11.4*   HEMATOCRIT 41.3   < > 36.8* 32.0* 32.1*   PLATELETCT 304   < > 248 228 227   PROTHROMBTM 13.5  --   --   --   --    APTT 32.1  --   --   --   --    INR 0.98  --   --   --   --     < > = values in this interval not displayed.     Recent Results (from the past 24 hour(s))   EC-ECHOCARDIOGRAM COMPLETE W/O CONT    Collection Time: 07/29/24  4:45 PM   Result Value Ref Range    Eject.Frac. MOD BP 66.59     Eject.Frac. MOD 4C 67.42     Eject.Frac. MOD 2C 66.54     Left Ventrical Ejection Fraction 65    CBC WITHOUT DIFFERENTIAL    Collection Time: 07/29/24  7:40 PM   Result Value Ref Range    WBC 6.9 4.8 - 10.8 K/uL    RBC 3.57 (L) 4.20 - 5.40 M/uL    Hemoglobin 11.3 (L) 12.0 - 16.0 g/dL    Hematocrit 32.0 (L) 37.0 - 47.0 %    MCV 89.6 81.4 - 97.8 fL    MCH 31.7 27.0 - 33.0 pg    MCHC 35.3 32.2 - 35.5 g/dL    RDW 42.2 35.9 - 50.0 fL    Platelet Count 228 164 - 446 K/uL    MPV 10.3 9.0 - 12.9 fL   Comp Metabolic Panel    Collection Time: 07/29/24  7:40 PM   Result Value Ref Range    Sodium 135 135 - 145 mmol/L    Potassium 3.2 (L) 3.6 - 5.5 mmol/L    Chloride 100 96 - 112 mmol/L    Co2 22 20 - 33 mmol/L    Anion Gap 13.0 7.0 - 16.0    Glucose 168 (H) 65 - 99 mg/dL    Bun 11 8 - 22 mg/dL    Creatinine 0.85 0.50 - 1.40 mg/dL    Calcium 8.2 (L) 8.4 - 10.2 mg/dL    Correct Calcium 9.2 8.5 - 10.5 mg/dL    AST(SGOT) 39 12 - 45 U/L     ALT(SGPT) 54 (H) 2 - 50 U/L    Alkaline Phosphatase 111 (H) 30 - 99 U/L    Total Bilirubin 0.4 0.1 - 1.5 mg/dL    Albumin 2.8 (L) 3.2 - 4.9 g/dL    Total Protein 4.9 (L) 6.0 - 8.2 g/dL    Globulin 2.1 1.9 - 3.5 g/dL    A-G Ratio 1.3 g/dL   TROPONIN    Collection Time: 24  7:40 PM   Result Value Ref Range    Troponin T 18 6 - 19 ng/L   MAGNESIUM    Collection Time: 24  7:40 PM   Result Value Ref Range    Magnesium 1.6 1.5 - 2.5 mg/dL   PHOSPHORUS    Collection Time: 24  7:40 PM   Result Value Ref Range    Phosphorus 1.9 (L) 2.5 - 4.5 mg/dL   ESTIMATED GFR    Collection Time: 24  7:40 PM   Result Value Ref Range    GFR (CKD-EPI) 67 >60 mL/min/1.73 m 2   POCT glucose device results    Collection Time: 24  9:13 PM   Result Value Ref Range    POC Glucose, Blood 138 (H) 65 - 99 mg/dL   POCT glucose device results    Collection Time: 24  6:33 AM   Result Value Ref Range    POC Glucose, Blood 136 (H) 65 - 99 mg/dL   EKG    Collection Time: 24  7:51 AM   Result Value Ref Range    Report       [facility]    Test Date:  2024  Pt Name:    LAST TAVERAS                  Department: ICU  MRN:        [id number]                      Room:       3317  Gender:     Female                       Technician: [id number]  :        [id number]                   Requested By:HARVEY MIRELES  Order #:    [id number]                    Reading MD:    Measurements  Intervals                                Axis  Rate:       61                           P:          -9  UT:         204                          QRS:        -11  QRSD:       99                           T:          -75  QT:         503  QTc:        507    Interpretive Statements  Sinus rhythm  Borderline low voltage, extremity leads  Abnrm T, consider ischemia, anterolateral lds  Prolonged QT interval  Compared to ECG 2024 19:27:24  Possible ischemia now present  Atrial fibrillation no longer present  T-wave abnormality no longer  present     CBC WITH DIFFERENTIAL    Collection Time: 07/30/24  8:05 AM   Result Value Ref Range    WBC 7.0 4.8 - 10.8 K/uL    RBC 3.60 (L) 4.20 - 5.40 M/uL    Hemoglobin 11.4 (L) 12.0 - 16.0 g/dL    Hematocrit 32.1 (L) 37.0 - 47.0 %    MCV 89.2 81.4 - 97.8 fL    MCH 31.7 27.0 - 33.0 pg    MCHC 35.5 32.2 - 35.5 g/dL    RDW 42.8 35.9 - 50.0 fL    Platelet Count 227 164 - 446 K/uL    MPV 9.8 9.0 - 12.9 fL    Neutrophils-Polys 75.40 (H) 44.00 - 72.00 %    Lymphocytes 16.00 (L) 22.00 - 41.00 %    Monocytes 6.60 0.00 - 13.40 %    Eosinophils 1.40 0.00 - 6.90 %    Basophils 0.30 0.00 - 1.80 %    Immature Granulocytes 0.30 0.00 - 0.90 %    Nucleated RBC 0.00 0.00 - 0.20 /100 WBC    Neutrophils (Absolute) 5.24 1.82 - 7.42 K/uL    Lymphs (Absolute) 1.11 1.00 - 4.80 K/uL    Monos (Absolute) 0.46 0.00 - 0.85 K/uL    Eos (Absolute) 0.10 0.00 - 0.51 K/uL    Baso (Absolute) 0.02 0.00 - 0.12 K/uL    Immature Granulocytes (abs) 0.02 0.00 - 0.11 K/uL    NRBC (Absolute) 0.00 K/uL   Comp Metabolic Panel    Collection Time: 07/30/24  8:05 AM   Result Value Ref Range    Sodium 137 135 - 145 mmol/L    Potassium 3.2 (L) 3.6 - 5.5 mmol/L    Chloride 103 96 - 112 mmol/L    Co2 26 20 - 33 mmol/L    Anion Gap 8.0 7.0 - 16.0    Glucose 133 (H) 65 - 99 mg/dL    Bun 10 8 - 22 mg/dL    Creatinine 0.66 0.50 - 1.40 mg/dL    Calcium 8.6 8.4 - 10.2 mg/dL    Correct Calcium 9.4 8.5 - 10.5 mg/dL    AST(SGOT) 31 12 - 45 U/L    ALT(SGPT) 47 2 - 50 U/L    Alkaline Phosphatase 109 (H) 30 - 99 U/L    Total Bilirubin 0.5 0.1 - 1.5 mg/dL    Albumin 3.0 (L) 3.2 - 4.9 g/dL    Total Protein 5.0 (L) 6.0 - 8.2 g/dL    Globulin 2.0 1.9 - 3.5 g/dL    A-G Ratio 1.5 g/dL   MAGNESIUM    Collection Time: 07/30/24  8:05 AM   Result Value Ref Range    Magnesium 1.6 1.5 - 2.5 mg/dL   PHOSPHORUS    Collection Time: 07/30/24  8:05 AM   Result Value Ref Range    Phosphorus 1.1 (L) 2.5 - 4.5 mg/dL   TSH WITH REFLEX TO FT4    Collection Time: 07/30/24  8:05 AM   Result Value  Ref Range    TSH 3.580 0.380 - 5.330 uIU/mL   ESTIMATED GFR    Collection Time: 07/30/24  8:05 AM   Result Value Ref Range    GFR (CKD-EPI) 86 >60 mL/min/1.73 m 2         Radiology Review:  EC-ECHOCARDIOGRAM COMPLETE W/O CONT   Final Result      DX-CHEST-PORTABLE (1 VIEW)   Final Result      Life support as detailed above. Interval improvement in inspiration and interstitial infiltrates.      DX-CHEST-LIMITED (1 VIEW)   Final Result         New, extensive bilateral interstitial infiltrates may be due to pulmonary edema and/or infection.      [id number] VIEW   Final Result      Findings suggesting fecal impaction and urinary bladder distention.      CT-ABDOMEN-PELVIS WITH   Final Result         1.  Changes compatible with constipation with large volume of stool in the rectal vault with rectal wall thickening and adjacent hazy fat stranding, appearance favoring rectal fecal impaction with probable component of proctocolitis.   2.  Intrahepatic and extrahepatic biliary ductal dilatation, commonly associated with postcholecystectomy physiology, consider causes of biliary obstruction with additional workup as clinically appropriate   3.  Fluid-filled prominence of small bowel suggesting ileus and/or enteritis.   4.  Diverticulosis            MDM (Data Review):   -Records reviewed and summarized in current documentation  -I personally reviewed and interpreted the laboratory results  -I personally reviewed the radiology images        Medical Decision Making, by Problem:  Active Hospital Problems    Diagnosis     Shock (HCC) [R57.9]     SVT (supraventricular tachycardia) (HCC) [I47.10]     Acute GI bleeding [K92.2]     High anion gap metabolic acidosis [E87.29]     Hyponatremia [E87.1]     Hyperglycemia [R73.9]     Elevated liver enzymes [R74.8]     Ileus (HCC) [K56.7]     Mild late onset Alzheimer's dementia without behavioral disturbance, psychotic disturbance, mood disturbance, or anxiety (HCC) [G30.1, F02.A0]      History of atrial fibrillation [Z86.79]            Assessment/Recommendations:  Assessment:  -Severe fecal impaction and constipation resistant to maximal enema and laxative therapy  -Rectal pain secondary to stercoral colitis likely  -SVT- Possibly secondary to respiratory distress and.or vagal stimulation  -Bradycardia and pause  -Pulmonary edema  -Acute hypoxemic respiratory failure with intubation  -Fluid overload    Plan:  -Continue Miralax TID  -Consider repeat CT if patient develops pain, fever, vomiting, or signs of peritonitis    GI will sign off    HAO Greenwood      Thank you for inviting me to participate in the care of this patient. Please do not hesitate to call GI consultants with additional questions/concerns or changes in the patient's clinical status at [phone].      Core Quality Measures   Reviewed items:  Labs, Medications and Radiology reports reviewed

## 2024-07-30 NOTE — PROGRESS NOTES
2343 patient HR dropped 20's to 30's while drinking medication. Recovered after approx 1 minute.     0050 patient went asystole again, this time approx 4 secs, and after had a HR 20's to 50's for about 5 minutes, then STACH -112.  After patient sustained higher HR for an hour, staff turned patient to clean her and HR dropped to the 20's to 40's for approx 2 minutes, BP does drop with napoleon episodes.  MD chu.      0148 HR dropped again 12 to 20's. Pushed 1mg Atropine. HR went to 50's then 70's within 5 minutes and sustained in the 70's. MD chu    0240 HR remains in the 70's

## 2024-07-30 NOTE — PROGRESS NOTES
"Approx 1923 patient went asystole for a total of a little over 13 seconds, monitor check was called overhead, upon entering room pt had \"glazed\" look and very pale, HR with radial pulse was napoleon approx  20,   code cart obtained, pads placed, MD paged, EKG obtained. Patient in afib with HR 30's to 118.    MD immediately at bedside for eval. Orders received.  "

## 2024-07-30 NOTE — H&P
[facility] History & Physical Note    Date of Service  7/30/2024    [facility]: [facility]   Attending: Isai Cardona D.o.  Resident: Dr. Jake Romero  Contact Number: [phone]    Primary Care Physician  Jimy Lange M.D.    Consultants  cardiology    Specialist Names: Dr. Flynn/ Dr. Ornelas    Code Status  Full Code    Chief Complaint  Transfer to [facility], from [facility], for possible PPM to further optimize her A-fib medications .      History of Presenting Illness (HPI): Gloria Herndon is a 83 y.o. female who presented 7/30/2024 from [facility] to 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 History mainly from , [place]. Past medical history of Atrial fibrillation (S/p Ablation in 2017 and cardioverison x 2 in the last 2 years) on eliquis, metoprolol and flecainide, and recent diagnosis of alzheimer. Initially admitted to [facility] for 2 weeks of constipation that became associated with nausea, rectal and abdominal pain. No inciting medications. GI was consulted and she was trialed on golytely and dulcolax which did not resolve the large stool burden. Transferred to ICU when she developed SVT, likely secondary to severe constipation. Was on a dilt drip Unfortunately, then developed flash noncardiogenic pulmonary edema from straining and transiently was intubated 7/30/2024. She was disimpacted while under anesthesia. Dilt drip was stopped. Over the night, after extubation, patient became more bradycardic and hypotensive, even off of her A-fib medications requiring atropine and levophed. Cardiology consulted at [facility] recommened transfer to [facility] for possible PPM and a. Fib medication optimization.    At baseline patient has poor memory, but is very active and goes to the gym 3 times a week. Caregiver is , [place]. She feeds herself and ambulates well.     I discussed the plan of care with patient and family.    Review of Systems  Review  of Systems   Unable to perform ROS: Dementia       Past Medical History   has a past medical history of Arrhythmia, Asthma, Atrial fibrillation (HCC), High cholesterol, History of cardiac radiofrequency ablation (12/01/2017), and TIA (transient ischemic attack) (10/2020).    Surgical History   has a past surgical history that includes other cardiac surgery; cholecystectomy (08/08/2003); and other (Bilateral).     Family History  family history is not on file.   Family history reviewed with patient.     Social History  Tobacco: Quit in her 30s  Alcohol: None  Recreational drugs (illegal or prescription): None  Employment: Retired Infectious Disease Doctor  Living Situation: With   Recent Travel: None  Primary Care Provider: Not Reviewed  Other (stressors, spirituality, exposures): None    Allergies  Allergies   Allergen Reactions    Blue Dyes Vomiting    Green Dyes Vomiting    Iodine Hives    Iodine Solution [Povidone Iodine] Unspecified     Red spots, pain    Omeprazole Unspecified     Urinary incontinence     Tape Unspecified     Welts on skin, paper tape is ok       Medications  Prior to Admission Medications   Prescriptions Last Dose Informant Patient Reported? Taking?   Evolocumab (REPATHA) 140 MG/ML Solution Auto-injector SubQ injection pen   No No   Sig: Inject 1 mL under the skin every 14 days.   acyclovir (ZOVIRAX) 400 MG tablet   Yes No   Sig: Take 400 mg by mouth 3 times a day as needed (Outbreak). Take for 5 days at first sign of cold sore.   apixaban (ELIQUIS) 2.5mg Tab   No No   Sig: Take 1 Tablet by mouth 2 times a day.   bisacodyl (DULCOLAX) 10 MG Suppos   No No   Sig: Insert 1 Suppository into the rectum every day.   clonazePAM (KLONOPIN) 0.5 MG Tab  Patient Yes No   Sig: TAKE ONE TABLET BY MOUTH NIGHTLY AT BEDTIME AS NEEDED FOR INSOMNIA FOR 30 DAYS   enoxaparin (LOVENOX) 40 MG/0.4ML Solution Prefilled Syringe inj   No No   Sig: Inject 40 mg under the skin every day at 6 PM.   estrogens,  conjugated (PREMARIN) 0.3 MG Tab  Patient Yes No   Sig: Take 0.3 mg by mouth every day.   flecainide (TAMBOCOR) 100 MG Tab   No No   Sig: Take 1 Tablet by mouth 2 times a day.   metoprolol SR (TOPROL XL) 100 MG TABLET SR 24 HR   No No   Sig: Take 1 Tablet by mouth every day.   norepinephrine (LEVOPHED) 8 mg/250 mL Solution   No No   Sig: Infuse 0-61.6 mcg/min into a venous catheter continuous.      Facility-Administered Medications: None       Physical Exam  Temp:  [37.6 °C (99.7 °F)-38 °C (100.4 °F)] 37.6 °C (99.7 °F)  Pulse:  [0-118] 54  Resp:  [12-55] 14  BP: ()/(36-77) 97/47  SpO2:  [87 %-100 %] 98 %  Blood Pressure : 97/47   Temperature: 37.6 °C (99.7 °F)   Pulse: (!) 54   Respiration: 14   Pulse Oximetry: 98 %       Physical Exam  Constitutional:       General: She is not in acute distress.     Appearance: She is not ill-appearing.      Comments: Sleeping but easily arousable   HENT:      Mouth/Throat:      Mouth: Mucous membranes are dry.   Eyes:      Pupils: Pupils are equal, round, and reactive to light.   Cardiovascular:      Rate and Rhythm: Bradycardia present.      Pulses: Normal pulses.      Heart sounds: No murmur heard.  Pulmonary:      Effort: Pulmonary effort is normal. No respiratory distress.   Abdominal:      General: Abdomen is flat. Bowel sounds are normal. There is no distension.      Palpations: Abdomen is soft.      Tenderness: There is no abdominal tenderness.   Musculoskeletal:         General: Normal range of motion.      Right lower leg: No edema.      Left lower leg: No edema.   Neurological:      General: No focal deficit present.      Comments: Oriented to self and . Not oriented to time, people, or place         Laboratory:  Recent Labs     07/29/24  1940 07/30/24  0805 07/30/24  1548   WBC 6.9 7.0 6.9   RBC 3.57* 3.60* 3.53*   HEMOGLOBIN 11.3* 11.4* 11.1*   HEMATOCRIT 32.0* 32.1* 32.3*   MCV 89.6 89.2 91.5   MCH 31.7 31.7 31.4   MCHC 35.3 35.5 34.4   RDW 42.2 42.8 44.4    PLATELETCT 228 227 230   MPV 10.3 9.8 10.1     Recent Labs     07/29/24 1940 07/30/24  0805 07/30/24  1522   SODIUM 135 137 137   POTASSIUM 3.2* 3.2* 3.6   CHLORIDE 100 103 103   CO2 22 26 21   GLUCOSE 168* 133* 129*   BUN 11 10 9   CREATININE 0.85 0.66 0.60   CALCIUM 8.2* 8.6 8.6     Recent Labs     07/29/24 1940 07/30/24  0805 07/30/24  1522   ALTSGPT 54* 47 49   ASTSGOT 39 31 51*   ALKPHOSPHAT 111* 109* 127*   TBILIRUBIN 0.4 0.5 0.5   GLUCOSE 168* 133* 129*     Recent Labs     07/27/24 1933 07/30/24  1548   APTT 32.1  --    INR 0.98 1.10     Recent Labs     07/29/24  0230   NTPROBNP 1816*     Recent Labs     07/29/24  0230   TRIGLYCERIDE 97     Recent Labs     07/29/24 1940 07/30/24  1522   TROPONINT 18 16       Imaging:  No orders to display       Assessment/Plan:  Problem Representation:  83 y.o. female who presents to [facility] for possible pacemaker in the setting of bradycardia and hypotension requiring atropine and levophed support.  I anticipate this patient will require at least two midnights for appropriate medical management, necessitating inpatient admission because bradycardia evaluation    Patient will need a ICU (Adult) bed.  The need is secondary to pressor support and possible pacemaker.    Symptomatic Bradycardia  Previous SVT  Atrial fibrillation   At [facility] SVT resolved with dilt, fluids and disimpaction. Then went into severe bradycardia with hypotension while off all cardiac medications  - Optimize electrolytes - CMP daily  - tele monitoring  - wean off levo to see how she does w/her HR and BP  - hold flecainide and metop   - Cardiology following - appreciate recs  - Discussed with Kavita of EP who discussed with Dr. Flynn that given no foreseeable procedure, okay to start home eliquis  - Low threshold to keep pads on, and atropine available for bradycardia      Severe constipation   Proctocolitis 2/2 rectal impaction  Resolved with manual disimpaction with GI 7/29 and then  enemas  - GI signed off from [facility]  - recommended Miralax TID + to consider repeat CT if patient develops pain, fever, vomiting, or signs of peritonitis  - not continuing PPI as rectal bleeding likely hemorrhoids       Acute hypoxic respiratory failure  2/2 flash pulmonary edema from severe constipation and SVT - improved. Intubated for manual disimpaction on 7/29 and extubated later that evening.  - wean o2 as tolerated  - CXR in AM  - no need for aggressive diuresis at this time  - I/Os  - pulmonary hygiene       VTE prophylaxis: therapeutic anticoagulation with Eliquis

## 2024-07-30 NOTE — DISCHARGE SUMMARY
Discharge Summary    CHIEF COMPLAINT ON ADMISSION  Chief Complaint   Patient presents with    Diarrhea    Nausea       Reason for Admission  Other    Admission Date  7/27/2024     CODE STATUS  Full Code    HPI & HOSPITAL COURSE  83 y.o. female with history of atrial fibrillation on eliquis who presented 7/27/2024 with nausea, rectal and abdominal pain. She was noted to be severe constipated with hard stool in rectum. She was given enema and tried manual disimpaction by RN on 7/28 w/severe pain and unable to get anything out. Case was then discussed with GI (Christine) who recommended golytely and dulcolax.   Of note, patient had CT A/P on 7/27 that showed constipation with large volume stool in rectal vault with rectal wall thickening and adjacent fat stranding c/f fecal impaction and proctocolitis. Also with fluid filled prominence of small bowel c/f ileus vs enteritis.  On 7/29 pt was transferred to ICU for SVT likely 2/2 her severe constipation and she was treated with IVFs and diltiazem gtt w/improvement in her SVT. She then went into flash pulmonary edema and was placed on HFNC. Patient was intubated for manual disimpaction by GI, too unsafe for sedation on HFNC so intubated instead. She was disimpacted and then had a lot of stool output afterwards with the enemas. She was extubated and weaned off dilt gtt.   On 7/29 PM, she had multiple episodes of severe bradycardia (wasn't straining or having  BM) and one 12 second pause. She was given atropine and then started on levophed. She remains on low dose levophed 7/30 AM. She was evaluated by cardiology who [facility] and then optimization of her medications - will continue to hold her flecainide and metoprolol at this time.      Therefore, she is discharged in fair and stable condition to a short-term general hosptial for inpatient care.    The patient met 2-midnight criteria for an inpatient stay at the time of  discharge.      FOLLOW UP ITEMS POST DISCHARGE  NA    DISCHARGE DIAGNOSES  Principal Problem:    Ileus (HCC) (POA: Yes)  Active Problems:    History of atrial fibrillation (POA: Yes)    Mild late onset Alzheimer's dementia without behavioral disturbance, psychotic disturbance, mood disturbance, or anxiety (HCC) (POA: Yes)    Acute GI bleeding (POA: Yes)    High anion gap metabolic acidosis (POA: Yes)    Hyponatremia (POA: Yes)    Hyperglycemia (POA: Yes)    Elevated liver enzymes (POA: Yes)    SVT (supraventricular tachycardia) (HCC) (POA: No)    Shock (HCC) (POA: Yes)  Resolved Problems:    * No resolved hospital problems. *      FOLLOW UP  Future Appointments   Date Time Provider Department Center   8/5/2024  3:15 PM HAO Brown None   4/21/2025 10:40 AM KARI Taylor None     No follow-up provider specified.    MEDICATIONS ON DISCHARGE     Medication List        START taking these medications        Instructions   bisacodyl 10 MG Supp  Start taking on: July 31, 2024  Commonly known as: Dulcolax   Insert 1 Suppository into the rectum every day.  Dose: 10 mg     enoxaparin 40 MG/0.4ML Sosy inj  Start taking on: July 31, 2024  Commonly known as: Lovenox   Inject 40 mg under the skin every day at 6 PM.  Dose: 40 mg     norepinephrine 8 mg/250 mL Soln  Commonly known as: Levophed   Infuse 0-61.6 mcg/min into a venous catheter continuous.  Dose: 0-1 mcg/kg/min            ASK your doctor about these medications        Instructions   acyclovir 400 MG tablet  Commonly known as: Zovirax   Take 400 mg by mouth 3 times a day as needed (Outbreak). Take for 5 days at first sign of cold sore.  Dose: 400 mg     apixaban 2.5mg Tabs  Commonly known as: Eliquis   Take 1 Tablet by mouth 2 times a day.  Dose: 2.5 mg     clonazePAM 0.5 MG Tabs  Commonly known as: KlonoPIN   TAKE ONE TABLET BY MOUTH NIGHTLY AT BEDTIME AS NEEDED FOR INSOMNIA FOR 30 DAYS     estrogens, conjugated 0.3 MG Tabs  Commonly  known as: Premarin   Take 0.3 mg by mouth every day.  Dose: 0.3 mg     Evolocumab 140 MG/ML Soaj SubQ injection pen  Commonly known as: REPATHA   Inject 1 mL under the skin every 14 days.  Dose: 140 mg     flecainide 100 MG Tabs  Commonly known as: Tambocor   Take 1 Tablet by mouth 2 times a day.  Dose: 100 mg     metoprolol  MG Tb24  Commonly known as: Toprol XL   Take 1 Tablet by mouth every day.  Dose: 100 mg              Allergies  Allergies   Allergen Reactions    Blue Dyes Vomiting    Green Dyes Vomiting    Iodine Hives    Iodine Solution [Povidone Iodine] Unspecified     Red spots, pain    Omeprazole Unspecified     Urinary incontinence     Tape Unspecified     Welts on skin, paper tape is ok       DIET  Orders Placed This Encounter   Procedures    Diet NPO Restrict to: Sips with Medications     Standing Status:   Standing     Number of Occurrences:   1     Order Specific Question:   Diet NPO Restrict to:     Answer:   Sips with Medications [3]       ACTIVITY  As tolerated.  Weight bearing as tolerated    LINES, DRAINS, AND WOUNDS  This is an automated list. Peripheral IVs will be removed prior to discharge.  Peripheral IV 07/27/24 20 G Right Antecubital (Active)   Site Assessment Clean;Dry;Intact 07/30/24 0900   Dressing Type Occlusive;Transparent 07/30/24 0900   Line Status Blood return noted;Flushed;Scrubbed the hub prior to access;Saline locked 07/30/24 0900   Dressing Status Clean;Dry;Intact 07/30/24 0900   Dressing Intervention N/A 07/30/24 0900   Dressing Change Due 08/03/24 07/30/24 0900   Date Primary Tubing Changed 07/27/24 07/28/24 0900   NEXT Primary Tubing Change  08/03/24 07/28/24 2114   Infiltration Grading ([facility], [facility]) 0 07/30/24 0900   Phlebitis Scale ([facility] Only) 0 07/30/24 0900       Peripheral IV 07/30/24 20 G Anterior;Right Forearm (Active)   Site Assessment Clean;Dry;Intact 07/30/24 0900   Dressing Type Occlusive 07/30/24 0900   Line Status Scrubbed the hub prior to  access;Flushed;Saline locked;No blood return 07/30/24 0900   Dressing Status Clean;Dry;Intact 07/30/24 0900   Dressing Intervention N/A 07/30/24 0900   Dressing Change Due 08/03/24 07/30/24 0900   Infiltration Grading ([facility], [facility]) 0 07/30/24 0900   Phlebitis Scale ([facility] Only) 0 07/30/24 0900     Urethral Catheter Temperature probe (Active)   Site Assessment Clean;Skin intact 07/30/24 0800   Collection Container Standard drainage bag 07/30/24 0800   Urinary Catheter Care Tamper Evident Seal in Place;Drainage Tube Extended;Drainage Bag Not Overfilled;Drainage Tubing Properly Secured;Drainage Bag Below Bladder Level and Not on Floor 07/30/24 0800   Securement Method Securing device (Describe) 07/30/24 0800   Output (mL) 125 mL 07/30/24 1200         Peripheral IV 07/27/24 20 G Right Antecubital (Active)   Site Assessment Clean;Dry;Intact 07/30/24 0900   Dressing Type Occlusive;Transparent 07/30/24 0900   Line Status Blood return noted;Flushed;Scrubbed the hub prior to access;Saline locked 07/30/24 0900   Dressing Status Clean;Dry;Intact 07/30/24 0900   Dressing Intervention N/A 07/30/24 0900   Dressing Change Due 08/03/24 07/30/24 0900   Date Primary Tubing Changed 07/27/24 07/28/24 0900   NEXT Primary Tubing Change  08/03/24 07/28/24 2114   Infiltration Grading ([facility], [facility]) 0 07/30/24 0900   Phlebitis Scale ([facility] Only) 0 07/30/24 0900       Peripheral IV 07/30/24 20 G Anterior;Right Forearm (Active)   Site Assessment Clean;Dry;Intact 07/30/24 0900   Dressing Type Occlusive 07/30/24 0900   Line Status Scrubbed the hub prior to access;Flushed;Saline locked;No blood return 07/30/24 0900   Dressing Status Clean;Dry;Intact 07/30/24 0900   Dressing Intervention N/A 07/30/24 0900   Dressing Change Due 08/03/24 07/30/24 0900   Infiltration Grading ([facility], [facility]) 0 07/30/24 0900   Phlebitis Scale ([facility] Only) 0 07/30/24 0900           Urethral Catheter Temperature probe (Active)   Site Assessment Clean;Skin intact  07/30/24 0800   Collection Container Standard drainage bag 07/30/24 0800   Urinary Catheter Care Tamper Evident Seal in Place;Drainage Tube Extended;Drainage Bag Not Overfilled;Drainage Tubing Properly Secured;Drainage Bag Below Bladder Level and Not on Floor 07/30/24 0800   Securement Method Securing device (Describe) 07/30/24 0800   Output (mL) 125 mL 07/30/24 1200        MENTAL STATUS ON TRANSFER             CONSULTATIONS  Gastroenterology   Cardiology    PROCEDURES  Intubated and extubated on 7/29  Manual rectal disimpaction by GI on 7/29    LABORATORY  Lab Results   Component Value Date    SODIUM 137 07/30/2024    POTASSIUM 3.2 (L) 07/30/2024    CHLORIDE 103 07/30/2024    CO2 26 07/30/2024    GLUCOSE 133 (H) 07/30/2024    BUN 10 07/30/2024    CREATININE 0.66 07/30/2024        Lab Results   Component Value Date    WBC 7.0 07/30/2024    HEMOGLOBIN 11.4 (L) 07/30/2024    HEMATOCRIT 32.1 (L) 07/30/2024    PLATELETCT 227 07/30/2024        Total time of the discharge process exceeds 20 minutes.

## 2024-07-30 NOTE — PROGRESS NOTES
SIDNEY INTENSIVIST DIRECT ADMISSION REPORT    Transferring facility: [facility]  Transferring physician: Dr Miller  Chief complaint: Bradycardiac  Pertinent history & patient course: 83-year-old female with past medical history of atrial fibrillation on Eliquis who presented to [facility] with abdominal pain and rectal pain.  Noted to be severely constipated.  She suffered a respiratory decompensation due to noncardiogenic pulmonary edema from straining and transiently was intubated yesterday.  Over the night patient became more bradycardic even off of her A-fib medications requiring atropine and levophed.  Cardiology evaluated the patient and recommended transfer to [facility] for possible PPM to further optimize her A-fib medications.  Pertinent imaging & lab results: hypokalema  Code Status: Full per transferring provider, I personally verified with the transferring provider patient's code status and the transferring provider has confirmed this with the patient.  Further work up or recommendations prior to transfer: none  Consultants called prior to transfer and pertinent input from consultants: none  Patient accepted for transfer: yes  Consultants to be called upon arrival: Intensivist  Floor requested: CICU  ADT order placed for accepted patient: yes    Please inform the Intensivist upon assignment of an ICU bed and then again on arrival of the patient.

## 2024-07-30 NOTE — PROGRESS NOTES
12 Eyes Skin Assessment Completed by Amanda, RN and Haley, RN, Laura, RN, Ernst, RN, Lopez, RN, Hi, RN.     Head WDL  Ears L ear good, R ear red, slow to conchis   Nose WDL  Mouth WDL  Neck WDL  Breast/Chest Redness blanching, rash    Shoulder Blades Redness, blanching   Spine Redness and Blanching  (R) Arm/Elbow/Hand Redness, blanching, bruising   (L) Arm/Elbow/Hand Redness, bruise, blanching   Abdomen Redness, rash   Groin Redness   Scrotum/Coccyx/Buttocks Redness and Blanching, slow to conchis   (R) Leg Redness and Blanching  (L) Leg Redness and Blanching, indent from holden   (R) Heel/Foot/Toe Redness, Blanching, and Boggy, R foot bruising   (L) Heel/Foot/Toe Redness, Blanching, and Boggy      All extremities are red all are slow to conchis.     Devices In Places ECG, Tele Box, Blood Pressure Cuff, Pulse Ox, Holden, SCD's, and Oxy Mask      Interventions In Place Gray Ear Foams, NC W/Ear Foams, Heel Mepilex, Sacral Mepilex, Heel Float Boots, Waffle Overlay, TAP System, Elbow Mepilex, Q2 Turns, Low Air Loss Mattress, and Barrier Cream    Possible Skin Injury Yes    Pictures Uploaded Into Epic Yes  Wound Consult Placed Yes  RN Wound Prevention Protocol Ordered Yes'

## 2024-07-30 NOTE — THERAPY
Physical Therapy Contact Note    Patient Name: Gloria Herndon  Age:  83 y.o., Sex:  female  Medical Record #: [id number]  Today's Date: 7/30/2024    Discussed missed therapy with RN       07/30/24 0904   Interdisciplinary Plan of Care Collaboration   IDT Collaboration with  Nursing   Collaboration Comments RN advising to hold as pt has recently went into asystole. Will re-attempt once medically stable

## 2024-07-30 NOTE — PROGRESS NOTES
12 hour chart check complete.    Monitor summary:    Rhythm:     8269-0588: Afib RVR, SVT, runs of Vtach HR: 190-200s  [id number] Afib/Aflutter HR: 100-110    Ectopy: fPVCs, bigemPVCs, trigem PVCs    Measurements: --/0.08/--

## 2024-07-30 NOTE — PROGRESS NOTES
At approx 1930 pt went into asystole for about 12 seconds. Pt promptly assessed and vitals taken. Pt alert with no complaints and hypotensive BP in 70s. Pt placed on code cart monitor pads and Dr. Hines notified and came to bedside to evaluate pt with new order for levo placed at this time.

## 2024-07-30 NOTE — PROGRESS NOTES
GI ATTENDING:    Patient seen and examined.  Chart reviewed and discussed with APRN at length.    Patient was in ICU bed and intubated and sedated.  It was determined that manual disimpaction is appropriate.    Patient was prepped in the left lateral position with assistance by nursing.  Digital rectal exam demonstrated firm stool ball in the distal rectum.  Single digit was used for methodical disimpaction of very hard stool.  Larger pieces were disrupted with the fingertip to become more manageable.  After numerous passes for removal of stool, dramatically softer stool was felt more proximal and the procedure was deemed complete.  During the procedure there was hemorrhoidal bleeding noticed but no suspicion of internal trauma.

## 2024-07-30 NOTE — ASSESSMENT & PLAN NOTE
Patient did have acute asystole as well as shock  Potentially secondary to a vagal episode, shortly after she became slightly tachycardic but her blood pressure remained low  Patient is being closely monitored in the ICU  I am having staff look at the time prior to the asystole to make sure she was not going into bradycardia  Potentially just vagal to however shock has persisted  I am going to start Levophed  Pressor choices difficult, since there was short run of asystole, I am hesitant to give neodue to potential for bradycardia which could have been the initiating event previously, however if I use epinephrine or dopamine, will risk significant tachyarrhythmia considering her history of atrial fibrillation and SVT, will use Levophed judiciously  Patient did have flash pulmonary edema earlier, did have rapid resolution even without Lasix, likely secondary to impaction however since she had received an IV fluid prior, will avoid ongoing IV fluids  Patient was restarted on metoprolol, did have her first dose approximately 2 hours prior to the event, will hold metoprolol    Patient is critically ill.   The patient continues to have : shock  The vital organ system that is effected is the: all are at risk  If untreated there is a high chance of deterioration into: worsening shock and death   The critical care that I am providing today is: levophed gtt  The critical care that has been undertaken is medically complex.   There has been no overlap in critical care time.  Critical care time not including procedures, no overlap: 68 minutes

## 2024-07-30 NOTE — DISCHARGE PLANNING
Patient transferring to [facility]. PCS form faxed to [facility].    [id number]  Spoke to patient and spouse at bedside. Transfer update given.

## 2024-07-30 NOTE — PROGRESS NOTES
12-hour chart check complete.    Monitor Summary  Rhythm: afib/SR/AJR  Rate: 30-120s  Ectopy: r-f PVC/tri[/coup/big/trig  Measurements: -/.10/-  Pt had several periods of asystole

## 2024-07-30 NOTE — PROGRESS NOTES
Critical Care Progress Note    Date of admission  7/27/2024    Chief Complaint/Hospital Course  83 y.o. female with history of atrial fibrillation on eliquis who presented 7/27/2024 with nausea, rectal and abdominal pain. She was noted to be severe constipated with hard stool in rectum. She was given enema and tried manual disimpaction by RN on 7/28 w/severe pain and unable to get anything out. Case was then discussed with GI (Christine) who recommended golytely and dulcolax.   Of note, patient had CT A/P on 7/27 that showed constipation with large volume stool in rectal vault with rectal wall thickening and adjacent fat stranding c/f fecal impaction and proctocolitis. Also with fluid filled prominence of small bowel c/f ileus vs enteritis.  On 7/29 pt was transferred to ICU for SVT likely 2/2 her severe constipation and she was treated with IVFs and diltiazem gtt w/improvement in her SVT. She then went into flash pulmonary edema and was placed on HFNC. Patient was intubated for manual disimpaction by GI, too unsafe for sedation on HFNC so intubated instead. She was disimpacted and then had a lot of stool output afterwards with the enemas. She was extubated and weaned off dilt gtt.   7/30: overnight, she had multiple episodes of severe bradycardia (wasn't straining or having  BM) and one 12 second pause. She was given atropine and then started on levophed.     Review of Systems  Review of Systems   Constitutional:  Positive for malaise/fatigue. Negative for fever.   Eyes:  Negative for redness.   Respiratory:  Negative for shortness of breath.    Cardiovascular:  Negative for chest pain.   Gastrointestinal:  Positive for diarrhea. Negative for abdominal pain, constipation and vomiting.   Musculoskeletal:  Negative for falls.   Neurological:  Negative for focal weakness.        Vital Signs for last 24 hours   Temp:  [37.2 °C (98.9 °F)-38 °C (100.4 °F)] 38 °C (100.4 °F)  Pulse:  [0-128] 58  Resp:  [12-62] 21  BP:  ()/(36-81) 104/55  SpO2:  [88 %-100 %] 92 %    Hemodynamic parameters for last 24 hours       Respiratory Information for the last 24 hours  Vent Mode: Spont  Rate (breaths/min): 18  Vt Target (mL): 370  PEEP/CPAP: 8  P Support: 5  MAP: 12  Control VTE (exp VT): 380    Physical Exam   Physical Exam  Vitals and nursing note reviewed.   Constitutional:       General: She is not in acute distress.     Appearance: She is ill-appearing.   HENT:      Head: Normocephalic.      Mouth/Throat:      Mouth: Mucous membranes are moist.   Eyes:      Conjunctiva/sclera: Conjunctivae normal.   Cardiovascular:      Rate and Rhythm: Regular rhythm. Bradycardia present.   Pulmonary:      Effort: Pulmonary effort is normal. No respiratory distress.   Abdominal:      Palpations: Abdomen is soft.   Musculoskeletal:         General: No deformity.   Skin:     General: Skin is warm and dry.   Neurological:      Mental Status: She is alert.      Comments: Confused this AM but follows commands and non-focal, likely some delirium from not having slept all night and poor sleep these past few nights         Medications  Current Facility-Administered Medications   Medication Dose Route Frequency Provider Last Rate Last Admin    potassium phosphate IVPB 30 mmol in 500 mL D5W (premix)  30 mmol Intravenous Once Jonelle Tovar M.D. 83.3 mL/hr at 07/30/24 0941 30 mmol at 07/30/24 0941    magnesium sulfate IVPB premix 4 g  4 g Intravenous Once Jonelle Tovar M.D. 25 mL/hr at 07/30/24 0940 4 g at 07/30/24 0940    potassium phosphate IVPB 30 mmol in 500 mL D5W (premix)  30 mmol Intravenous Once Jonelle Tovar M.D.        [Held by provider] flecainide (Tambocor) tablet 100 mg  100 mg Oral BID Kylah Simms M.D.   100 mg at 07/29/24 1719    senna-docusate (Pericolace Or Senokot S) 8.6-50 MG per tablet 2 Tablet  2 Tablet Oral Q EVENING Kylah Simms M.D.   2 Tablet at 07/29/24 1719    And    polyethylene glycol/lytes (Miralax)  Packet 1 Packet  1 Packet Oral 4X/DAY Kylah Simms M.D.   1 Packet at 07/29/24 2343    traMADol (Ultram) 50 MG tablet 50 mg  50 mg Oral Q6HRS PRN Kylah Simms M.D.        ondansetron (Zofran ODT) dispertab 4 mg  4 mg Oral Q4HRS PRN Kylah Simms M.D.        clonazePAM (KlonoPIN) tablet 0.5 mg  0.5 mg Oral QHS PRN Kylah Simms M.D.        acetaminophen (Tylenol) tablet 650 mg  650 mg Oral Q6HRS PRN Kylah Simms M.D.        norepinephrine (Levophed) 8 mg in 250 mL NS infusion (premix)  0-1 mcg/kg/min (Ideal) Intravenous Continuous Eric Hines D.O. 5.8 mL/hr at 07/30/24 0814 0.05 mcg/kg/min at 07/30/24 0814    hydrocortisone 1 % cream   Topical BID Kylah Simms M.D.   Given at 07/30/24 0634    [Held by provider] metoprolol SR (Toprol XL) tablet 100 mg  100 mg Oral DAILY Kylah Simms M.D.   100 mg at 07/29/24 1719    labetalol (Normodyne/Trandate) injection 10 mg  10 mg Intravenous Q4HRS PRN Eric Hines D.O.        ondansetron (Zofran) syringe/vial injection 4 mg  4 mg Intravenous Q4HRS PRN Eric Hines D.O.        pantoprazole (Protonix) injection 40 mg  40 mg Intravenous BID Eric Hines D.O.   40 mg at 07/30/24 0553    bisacodyl (Dulcolax) suppository 10 mg  10 mg Rectal DAILY Eric Hines D.O.   10 mg at 07/28/24 0748       Fluids    Intake/Output Summary (Last 24 hours) at 7/30/2024 1037  Last data filed at 7/30/2024 0800  Gross per 24 hour   Intake 273.32 ml   Output 1578 ml   Net -1304.68 ml       Laboratory  Recent Labs     07/29/24  0946   ISTATAPH 7.336*   ISTATAPCO2 23.7*   ISTATAPO2 70   ISTATATCO2 13*   BDCAIFX0XTW 93   ISTATARTHCO3 12.7*   ISTATARTBE -11*   ISTATTEMP 99.0 F   ISTATFIO2 100   ISTATSPEC Arterial   ISTATAPHTC 7.333*   [id number] 71         Recent Labs     07/29/24  0230 07/29/24  1940 07/30/24  0805   SODIUM 137  137 135 137   POTASSIUM 3.6  3.6 3.2* 3.2*   CHLORIDE 101  101 100 103   CO2 16*  16* 22 26   BUN 13  13 11 10    CREATININE 0.98  0.98 0.85 0.66   MAGNESIUM 1.9 1.6 1.6   PHOSPHORUS 1.9* 1.9* 1.1*   CALCIUM 8.9  8.9 8.2* 8.6     Recent Labs     07/29/24 0230 07/29/24 1940 07/30/24  0805   ALTSGPT 72* 54* 47   ASTSGOT 63* 39 31   ALKPHOSPHAT 132* 111* 109*   TBILIRUBIN 0.5 0.4 0.5   GLUCOSE 109*  109* 168* 133*     Recent Labs     07/27/24 1933 07/28/24 0025 07/29/24 0230 07/29/24 1940 07/30/24  0805   WBC 7.7   < > 8.3 6.9 7.0   NEUTSPOLYS 73.20*  --   --   --  75.40*   LYMPHOCYTES 19.30*  --   --   --  16.00*   MONOCYTES 6.50  --   --   --  6.60   EOSINOPHILS 0.40  --   --   --  1.40   BASOPHILS 0.30  --   --   --  0.30   ASTSGOT 100*   < > 63* 39 31   ALTSGPT 112*   < > 72* 54* 47   ALKPHOSPHAT 161*   < > 132* 111* 109*   TBILIRUBIN 0.6   < > 0.5 0.4 0.5    < > = values in this interval not displayed.     Recent Labs     07/27/24 1933 07/28/24 0025 07/29/24 0230 07/29/24 1940 07/30/24  0805   RBC 4.62   < > 4.06* 3.57* 3.60*   HEMOGLOBIN 14.5   < > 12.6 11.3* 11.4*   HEMATOCRIT 41.3   < > 36.8* 32.0* 32.1*   PLATELETCT 304   < > 248 228 227   PROTHROMBTM 13.5  --   --   --   --    APTT 32.1  --   --   --   --    INR 0.98  --   --   --   --     < > = values in this interval not displayed.       Imaging  Reviewed     Assessment/Plan    SVT  Atrial fibrillation   Now bradycardic  SVT resolved with dilt, fluids and disimpaction. Then went into severe bradycardia overnight with hypotension and pauses  - replete K, Mg and phos this AM  - tele monitoring  - wean off levo to see how she does w/her HR and BP  - hold flecainide and metop   - appreciate cardiology recs  - continue to hold eliquis while pending cards recs, can re-start if ok   - monitor/replete lytes closely  - pads on, atropine available      Severe constipation  Proctocolitis 2/2 rectal impaction  Resolved with manual disimpaction with GI yesterday and then enemas  - aggressive bowel regimen  - PRN enemas available   - appreciate GI recs     GI  bleed  Stool seems to be brown smears but has blood on toilet paper upon wiping so likely hemorrhoids  - continue to hold eliquis while pending cards recs, can re-start if ok   - monitor H/H     Anion gap metabolic acidosis  Resolved - likely from poor PO intake for several days now - got 2 amps of bicarb yesterday.   - full liquids - advance diet as tolerated  - monitor    Acute hypoxic respiratory failure  2/2 flash pulmonary edema from severe constipation and SVT, now much improved. Intubated for manual disimpaction on 7/29 and extubated later that evening.  - wean o2 as tolerated  - cautious with IVFs  - no need for aggressive diuresis at this time  - I/Os  - pulmonary hygiene          VTE:  Lovenox  Ulcer: Not Indicated  Lines: Flores Catheter  Ongoing indication addressed    I have performed a physical exam and reviewed and updated ROS and Plan today (7/30/2024). In review of yesterday's note (7/29/2024), there are no changes except as documented above.     Discussed patient condition and risk of morbidity and/or mortality with Family, RN, RT, Pharmacy, Charge nurse / hot rounds, and Patient  The patient remains critically ill.  Critical care time = 75 minutes in directly providing and coordinating critical care and extensive data review.  No time overlap and excludes procedures.        Jonelle Tovar MD  Pulmonary and Critical Care Medicine  [facility]

## 2024-07-30 NOTE — PROGRESS NOTES
Pt second enema held due to instability of vitals. Pt went napoleon into 20s when turned on side to clean but alert and responsive. At this point pt is not stable enough to attempt enema.    0225 Pt confused and pulled out IV in forearm and continues to keep pulling at SpO2 probe despite education importance of leaving it on. Restraints placed for pts safety and to prevent removal of monitoring devices.

## 2024-07-30 NOTE — CARE PLAN
The patient is Unstable - High likelihood or risk of patient condition declining or worsening    Shift Goals  Clinical Goals: SBP>90, monitor vitals, safety  Patient Goals: feel better  Family Goals: for pt to get better, updates    Progress made toward(s) clinical / shift goals:     Patient is not progressing towards the following goals:      Problem: Knowledge Deficit - Standard  Goal: Patient and family/care givers will demonstrate understanding of plan of care, disease process/condition, diagnostic tests and medications  Description: Target End Date:  1-3 days or as soon as patient condition allows    Document in Patient Education    1.  Patient and family/caregiver oriented to unit, equipment, visitation policy and means for communicating concern  2.  Complete/review Learning Assessment  3.  Assess knowledge level of disease process/condition, treatment plan, diagnostic tests and medications  4.  Explain disease process/condition, treatment plan, diagnostic tests and medications  Outcome: Not Progressing     Problem: Hemodynamics  Goal: Patient's hemodynamics, fluid balance and neurologic status will be stable or improve  Description: Target End Date:  Prior to discharge or change in level of care    Document on Assessment and I/O flowsheet templates    1.  Monitor vital signs, pulse oximetry and cardiac monitor per provider order and/or policy  2.  Maintain blood pressure per provider order  3.  Hemodynamic monitoring per provider order  4.  Manage IV fluids and IV infusions  5.  Monitor intake and output  6.  Daily weights per unit policy or provider order  7.  Assess peripheral pulses and capillary refill  8.  Assess color and body temperature  9.  Position patient for maximum circulation/cardiac output  10. Monitor for signs/symptoms of excessive bleeding  11. Assess mental status, restlessness and changes in level of consciousness  12. Monitor temperature and report fever or hypothermia to provider  immediately. Consideration of targeted temperature management.  Outcome: Not Progressing

## 2024-07-30 NOTE — DOCUMENTATION QUERY
[facility]                                                                       Query Response Note      PATIENT:               LAST TAVERAS  ACCT #:                  [id number]  MRN:                     [id number]  :                      1940  ADMIT DATE:       2024 6:54 PM  DISCH DATE:        2024 2:29 PM  RESPONDING  PROVIDER #:        [id number]           QUERY TEXT:    Shock is documented in the   progress note.    Please specify the type. (Includes suspected/probable)    The patient's Clinical Indicators include:  Clinical Findings:     :   - 0548  note: SVT - If untreated there is a high chance of deterioration into: Cardiogenic shock  -1904 HM note: 12 seconds of asystole followed by tachycardia and hypotension  -; BP 77/40; MAP 51  -HR 32; BP 65/40; MAP 48     :    -CC note severe bradycardia overnight with hypotension and pauses  -HR 58; BP 84/47; MAP 59      Risk factors: Fecal impaction s/p manual disimpaction, flash pulmonary edema, acute GI bleed, SVT/a fib RVR  Treatments: CC monitoring, atropine,  Levophed gtt, O2 support, hold metoprolol, telemetry monitoring, EKG, ECHO      Contact me with any questions.    Thank you for your time and attention,  Blanca Barnard RN, CDI  Connect via email, Voalte or messenger.    Note: If you agree with a listed diagnosis, please remember to include it in your concurrent daily documentation and onto the Discharge Summary.  Options provided:   -- Cardiogenic shock   -- Other shock   -- Shock does not exist   -- Other explanation, (please specify other explanation)   -- Unable to determine      Query created by: Blanca Barnard on 2024 1:03 PM    RESPONSE TEXT:    Other shock          Electronically signed by:  HARVEY MIRELES MD 2024 2:32 PM

## 2024-07-30 NOTE — PROGRESS NOTES
[facility] Medicine Daily Progress Note    Date of Service  7/29/2024    Chief Complaint  Gloria Herndon is a 83 y.o. female admitted 7/27/2024 with ileus and stool impaction    Hospital Course  No notes on file    Interval Problem Update  Patient did have disimpaction performed today in the OR, patient was intubated and has since been extubated  I was called by ER staff due to 12 seconds of asystole  Patient continues to be hypotensive  I had an extensive conversation with the  at bedside.  I also discussed the case with intensivist.      Consultants/Specialty  GI and pulmonary    Code Status  Full Code    Disposition  The patient is not medically cleared for discharge to home or a post-acute facility.  Anticipate discharge to: home with organized home healthcare and close outpatient follow-up    I have placed the appropriate orders for post-discharge needs.    Review of Systems  Review of Systems   Unable to perform ROS: Dementia        Physical Exam  Temp:  [37.1 °C (98.8 °F)-37.2 °C (99 °F)] 37.2 °C (98.9 °F)  Pulse:  [0-197] 44  Resp:  [12-62] 22  BP: ()/(36-94) 74/42  SpO2:  [86 %-100 %] 94 %    Physical Exam  Vitals and nursing note reviewed.   Constitutional:       General: She is not in acute distress.     Appearance: She is well-developed. She is not diaphoretic.   HENT:      Head: Normocephalic and atraumatic.      Right Ear: External ear normal.      Left Ear: External ear normal.      Nose: Nose normal. No congestion or rhinorrhea.      Mouth/Throat:      Mouth: Mucous membranes are moist.      Pharynx: No oropharyngeal exudate.   Eyes:      General:         Right eye: No discharge.         Left eye: No discharge.   Neck:      Trachea: No tracheal deviation.   Cardiovascular:      Rate and Rhythm: Tachycardia present. Rhythm irregularly irregular.      Heart sounds: No murmur heard.     No friction rub. No gallop.   Pulmonary:      Effort: Pulmonary effort is normal. No respiratory  distress.      Breath sounds: Normal breath sounds. No stridor. No wheezing or rales.   Abdominal:      General: Bowel sounds are normal. There is no distension.      Palpations: Abdomen is soft.   Musculoskeletal:      Cervical back: Neck supple.      Right lower leg: No edema.      Left lower leg: No edema.   Lymphadenopathy:      Cervical: No cervical adenopathy.   Skin:     General: Skin is warm and dry.      Findings: No erythema or rash.   Neurological:      Comments: Awake, confused, mostly nonverbal   Psychiatric:         Cognition and Memory: Cognition is impaired. Memory is impaired.         Fluids    Intake/Output Summary (Last 24 hours) at 7/29/2024 2004  Last data filed at 7/29/2024 1800  Gross per 24 hour   Intake 3137.52 ml   Output 1110 ml   Net 2027.52 ml       Laboratory  Recent Labs     07/27/24 1933 07/28/24  0025 07/28/24  1154 07/29/24  0230   WBC 7.7 8.9  --  8.3   RBC 4.62 4.50  --  4.06*   HEMOGLOBIN 14.5 14.1 13.5 12.6   HEMATOCRIT 41.3 41.9 38.7 36.8*   MCV 89.4 93.1  --  90.6   MCH 31.4 31.3  --  31.0   MCHC 35.1 33.7  --  34.2   RDW 39.6 42.3  --  41.7   PLATELETCT 304 230  --  248   MPV 9.9 10.2  --  10.0     Recent Labs     07/27/24 1933 07/28/24  0025 07/29/24  0230   SODIUM 130* 130* 137  137   POTASSIUM 4.2 4.2 3.6  3.6   CHLORIDE 95* 97 101  101   CO2 16* 13* 16*  16*   GLUCOSE 124* 115* 109*  109*   BUN 17 16 13  13   CREATININE 0.76 0.78 0.98  0.98   CALCIUM 9.7 9.0 8.9  8.9     Recent Labs     07/27/24 1933   APTT 32.1   INR 0.98         Recent Labs     07/29/24  0230   TRIGLYCERIDE 97       Imaging  EC-ECHOCARDIOGRAM COMPLETE W/O CONT   Final Result      DX-CHEST-PORTABLE (1 VIEW)   Final Result      Life support as detailed above. Interval improvement in inspiration and interstitial infiltrates.      DX-CHEST-LIMITED (1 VIEW)   Final Result         New, extensive bilateral interstitial infiltrates may be due to pulmonary edema and/or infection.      [id number]  VIEW   Final Result      Findings suggesting fecal impaction and urinary bladder distention.      CT-ABDOMEN-PELVIS WITH   Final Result         1.  Changes compatible with constipation with large volume of stool in the rectal vault with rectal wall thickening and adjacent hazy fat stranding, appearance favoring rectal fecal impaction with probable component of proctocolitis.   2.  Intrahepatic and extrahepatic biliary ductal dilatation, commonly associated with postcholecystectomy physiology, consider causes of biliary obstruction with additional workup as clinically appropriate   3.  Fluid-filled prominence of small bowel suggesting ileus and/or enteritis.   4.  Diverticulosis           Assessment/Plan  * Ileus (HCC)- (present on admission)  Assessment & Plan  Intensivist discussed the case with gastroenterology who did a disimpaction in the OR  Patient had received significant GoLytely  Certainly possible that patient has ongoing bowel movements leading to vagus nerve stimulation    Shock (HCC)- (present on admission)  Assessment & Plan  Patient did have acute asystole as well as shock  Potentially secondary to a vagal episode, shortly after she became slightly tachycardic but her blood pressure remained low  Patient is being closely monitored in the ICU  I am having staff look at the time prior to the asystole to make sure she was not going into bradycardia  Potentially just vagal to however shock has persisted  I am going to start Levophed  Pressor choices difficult, since there was short run of asystole, I am hesitant to give neodue to potential for bradycardia which could have been the initiating event previously, however if I use epinephrine or dopamine, will risk significant tachyarrhythmia considering her history of atrial fibrillation and SVT, will use Levophed judiciously  Patient did have flash pulmonary edema earlier, did have rapid resolution even without Lasix, likely secondary to impaction however  since she had received an IV fluid prior, will avoid ongoing IV fluids  Patient was restarted on metoprolol, did have her first dose approximately 2 hours prior to the event, will hold metoprolol    Patient is critically ill.   The patient continues to have : shock  The vital organ system that is effected is the: all are at risk  If untreated there is a high chance of deterioration into: worsening shock and death   The critical care that I am providing today is: levophed gtt  The critical care that has been undertaken is medically complex.   There has been no overlap in critical care time.  Critical care time not including procedures, no overlap: 68 minutes      SVT (supraventricular tachycardia) ([facility])  Assessment & Plan  Resolved with diltiazem drip  Now back into atrial fibrillation, mild RVR    High anion gap metabolic acidosis- (present on admission)  Assessment & Plan  Likely due to decreased oral intake due to constipation  Patient will unlikely be taking in a significant amount orally  IVFs for today  Bicarb started    Acute GI bleeding- (present on admission)  Assessment & Plan  I believe patient likely has hemorrhoids  I discussed w/ nursing stools are brown, has bright blood on toilet paper  Pt is not anemic  Eliquis on hold still  H/H BID today    Elevated liver enzymes- (present on admission)  Assessment & Plan  Mild, repeat CMP in the morning    Hyperglycemia- (present on admission)  Assessment & Plan  Mild however we will start insulin sliding scale to closely monitor for potential worsening    Hyponatremia- (present on admission)  Assessment & Plan  Due to dehydration  Start IV fluids  Repeat BMP in the morning    Mild late onset Alzheimer's dementia without behavioral disturbance, psychotic disturbance, mood disturbance, or anxiety ([facility])- (present on admission)  Assessment & Plan  Likely at baseline  Continue home as needed nightly clonazepam  If she develops significant agitation, will trial as  needed Haldol   is concerned about her trying to get up, fall precautions in place    History of atrial fibrillation- (present on admission)  Assessment & Plan  Now atrial fibrillation with mild RVR  No need for rate control medication at this time         VTE prophylaxis:   SCDs/TEDs      I have performed a physical exam and reviewed and updated ROS and Plan today (7/29/2024). In review of yesterday's note (7/28/2024), there are no changes except as documented above.

## 2024-07-30 NOTE — CONSULTS
Reason for Consult:  Asked by Dr Tovar providers found to see this patient with bradycardia and asystole    CC:   Chief Complaint   Patient presents with    Diarrhea    Nausea       HPI:      83 year old woman PMH atrial fibrillation s/p ablation 12/2017, HLD, hx TIA, alzheimer's presents with marcela tarry stools and rectal pain found ileus and constipation/impaction. Hospital stay c/b flash pulmonary edema and AF RVR. Treated with rate controlling agents c/b bradycardia and shock requiring vasopressor support. Consult for tachy-napoleon syndrome. Patient delerius given acute illness.  bedside describes followed by multiple cardiologists at [facility]. 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     Medications / Drug list prior to admission:  No current facility-administered medications on file prior to encounter.     Current Outpatient Medications on File Prior to Encounter   Medication Sig Dispense Refill    acyclovir (ZOVIRAX) 400 MG tablet Take 400 mg by mouth 3 times a day as needed (Outbreak). Take for 5 days at first sign of cold sore.      apixaban (ELIQUIS) 2.5mg Tab Take 1 Tablet by mouth 2 times a day. 200 Tablet 3    flecainide (TAMBOCOR) 100 MG Tab Take 1 Tablet by mouth 2 times a day. 200 Tablet 3    metoprolol SR (TOPROL XL) 100 MG TABLET SR 24 HR Take 1 Tablet by mouth every day. 90 Tablet 3    Evolocumab (REPATHA) 140 MG/ML Solution Auto-injector SubQ injection pen Inject 1 mL under the skin every 14 days. 6 mL 3    clonazePAM (KLONOPIN) 0.5 MG Tab TAKE ONE TABLET BY MOUTH NIGHTLY AT BEDTIME AS NEEDED FOR INSOMNIA FOR 30 DAYS      estrogens, conjugated (PREMARIN) 0.3 MG Tab Take 0.3 mg by mouth every day.         Current list of administered Medications:  No current facility-administered medications for this encounter.  No current outpatient medications on file.    Facility-Administered Medications Ordered in Other  Encounters:     NOREPINEPHRINE-SODIUM CHLORIDE 8-0.9 MG/250ML-% IV SOLN, , , ,     norepinephrine (Levophed) 8 mg in 250 mL NS infusion (premix), 0-1 mcg/kg/min (Ideal), Intravenous, Continuous, Isai Cardona D.O.    Past Medical History:   Diagnosis Date    Arrhythmia     Pt states she has Atrial Fib    Asthma     Atrial fibrillation (HCC)     High cholesterol     History of cardiac radiofrequency ablation 12/01/2017    TIA (transient ischemic attack) 10/2020       Past Surgical History:   Procedure Laterality Date    CHOLECYSTECTOMY  08/08/2003    OTHER Bilateral     Lens 2013    OTHER CARDIAC SURGERY      ablation       History reviewed. No pertinent family history.  Patient family history was personally reviewed, no pertinent family history to current presentation    Social History     Socioeconomic History    Marital status:      Spouse name: Not on file    Number of children: Not on file    Years of education: Not on file    Highest education level: Not on file   Occupational History    Not on file   Tobacco Use    Smoking status: Never    Smokeless tobacco: Never   Vaping Use    Vaping status: Never Used   Substance and Sexual Activity    Alcohol use: Not Currently     Comment: very rare glass of wine    Drug use: Not Currently    Sexual activity: Not on file   Other Topics Concern    Not on file   Social History Narrative    Not on file     Social Determinants of Health     Financial Resource Strain: Not on file   Food Insecurity: No Food Insecurity (7/27/2024)    Hunger Vital Sign     Worried About Running Out of Food in the Last Year: Never true     Ran Out of Food in the Last Year: Never true   Transportation Needs: No Transportation Needs (7/27/2024)    PRAPARE - Transportation     Lack of Transportation (Medical): No     Lack of Transportation (Non-Medical): No   Physical Activity: Not on file   Stress: Not on file   Social Connections: Not on file   Intimate Partner Violence: Not At Risk  (7/27/2024)    Humiliation, Afraid, Rape, and Kick questionnaire     Fear of Current or Ex-Partner: No     Emotionally Abused: No     Physically Abused: No     Sexually Abused: No   Housing Stability: Low Risk  (7/27/2024)    Housing Stability Vital Sign     Unable to Pay for Housing in the Last Year: No     Number of Places Lived in the Last Year: 0     Unstable Housing in the Last Year: No       ALLERGIES:  Allergies   Allergen Reactions    Blue Dyes Vomiting    Green Dyes Vomiting    Iodine Hives    Iodine Solution [Povidone Iodine] Unspecified     Red spots, pain    Omeprazole Unspecified     Urinary incontinence     Tape Unspecified     Welts on skin, paper tape is ok       Review of systems:  Unable to participate. Delirious.     Physical exam:  Patient Vitals for the past 24 hrs:   BP Temp Temp src Pulse Resp SpO2 Weight   07/30/24 1415 -- -- -- (!) 56 (!) 22 96 % --   07/30/24 1400 94/50 -- -- (!) 57 (!) 21 96 % --   07/30/24 1345 95/55 -- -- (!) 59 (!) 24 93 % --   07/30/24 1330 (!) 88/51 -- -- (!) 57 (!) 27 95 % --   07/30/24 1315 (!) 81/48 -- -- 60 (!) 27 93 % --   07/30/24 1300 (!) 78/42 -- -- (!) 58 20 93 % --   07/30/24 1245 (!) 84/47 -- -- (!) 58 13 91 % --   07/30/24 1230 (!) 89/51 -- -- (!) 59 18 91 % --   07/30/24 1215 (!) 86/48 -- -- 65 (!) 32 89 % --   07/30/24 1200 97/50 -- Bladder 60 16 94 % --   07/30/24 1145 (!) 87/49 -- -- (!) 53 18 95 % --   07/30/24 1130 92/55 -- -- (!) 54 18 95 % --   07/30/24 1115 94/50 -- -- (!) 56 20 94 % --   07/30/24 1100 97/53 -- -- (!) 54 20 95 % --   07/30/24 1045 98/54 -- -- (!) 56 16 97 % --   07/30/24 1030 97/53 -- -- (!) 58 (!) 21 (!) 87 % --   07/30/24 1015 100/54 -- -- (!) 59 (!) 22 90 % --   07/30/24 1000 104/55 -- Bladder (!) 58 (!) 21 92 % --   07/30/24 0945 106/53 -- -- (!) 58 20 94 % --   07/30/24 0930 99/52 -- -- 60 (!) 23 95 % --   07/30/24 0915 101/51 -- -- 60 (!) 21 94 % --   07/30/24 0900 102/52 -- -- 61 19 92 % --   07/30/24 0845 105/52 -- -- 62  19 93 % --   07/30/24 0830 103/52 -- -- 61 18 94 % --   07/30/24 0815 123/55 -- -- 61 16 93 % --   07/30/24 0800 115/72 -- Bladder 62 18 92 % --   07/30/24 0745 116/58 -- -- 61 19 95 % --   07/30/24 0730 110/56 -- -- 64 (!) 25 91 % --   07/30/24 0715 111/56 -- -- (!) 58 18 93 % --   07/30/24 0700 108/55 -- -- 63 (!) 25 91 % --   07/30/24 0645 110/53 -- -- 66 (!) 21 91 % --   07/30/24 0630 105/55 -- -- 65 (!) 22 92 % --   07/30/24 0615 106/55 -- -- 65 19 91 % --   07/30/24 0600 104/58 38 °C (100.4 °F) Bladder 65 12 93 % 64.2 kg (141 lb 8.6 oz)   07/30/24 0545 107/55 -- -- 66 (!) 24 90 % --   07/30/24 0530 108/62 -- -- 68 18 94 % --   07/30/24 0515 102/64 -- -- 71 (!) 22 92 % --   07/30/24 0500 90/57 -- -- (!) 48 (!) 25 93 % --   07/30/24 0445 102/53 -- -- 62 (!) 22 91 % --   07/30/24 0430 99/55 -- -- 63 (!) 34 92 % --   07/30/24 0415 102/53 -- -- 64 (!) 22 92 % --   07/30/24 0400 123/62 37.8 °C (100 °F) Bladder (!) 118 19 92 % --   07/30/24 0350 124/67 -- -- (!) 117 (!) 43 92 % --   07/30/24 0340 110/59 -- -- 76 (!) 21 91 % --   07/30/24 0325 97/48 -- -- (!) 51 (!) 25 89 % --   07/30/24 0300 91/53 -- -- (!) 50 (!) 27 94 % --   07/30/24 0245 116/57 -- -- 70 (!) 55 92 % --   07/30/24 0234 (!) 87/56 -- -- (!) 52 15 96 % --   07/30/24 0230 92/55 -- -- 77 (!) 29 88 % --   07/30/24 0215 109/55 -- -- 69 19 92 % --   07/30/24 0200 104/75 -- -- 71 (!) 21 95 % --   07/30/24 0145 (!) 81/47 -- -- (!) 45 14 92 % --   07/30/24 0130 90/51 -- -- (!) 53 17 95 % --   07/30/24 0115 114/61 -- -- (!) 108 17 96 % --   07/30/24 0100 115/62 -- -- (!) 107 18 96 % --   07/30/24 0045 97/58 -- -- 99 (!) 29 96 % --   07/30/24 0030 95/68 -- -- 99 (!) 30 100 % --   07/30/24 0015 106/67 -- -- 83 17 96 % --   07/30/24 0000 -- -- -- 91 13 97 % --   07/29/24 2348 110/77 -- -- 98 18 96 % --   07/29/24 2347 (!) 70/42 -- -- (!) 27 (!) 41 95 % --   07/29/24 2346 -- -- -- (!) 41 (!) 24 96 % --   07/29/24 2345 -- -- -- (!) 36 19 96 % --   07/29/24 2344 --  -- -- (!) 37 16 96 % --   07/29/24 2330 92/62 -- -- (!) 105 (!) 36 95 % --   07/29/24 2315 94/52 -- -- 67 18 96 % --   07/29/24 2245 (!) 86/52 -- -- 87 19 96 % --   07/29/24 2230 104/53 -- -- 97 17 96 % --   07/29/24 2215 106/61 -- -- (!) 107 19 96 % --   07/29/24 2200 98/70 37.8 °C (100 °F) Bladder (!) 106 (!) 23 96 % --   07/29/24 2145 105/60 -- -- 92 14 95 % --   07/29/24 2130 105/67 -- -- (!) 101 19 96 % --   07/29/24 2115 112/55 -- -- 92 (!) 26 97 % --   07/29/24 2100 101/62 -- -- 93 17 96 % --   07/29/24 2045 107/53 -- -- 98 13 97 % --   07/29/24 2030 104/54 -- -- (!) 108 17 97 % --   07/29/24 2025 108/52 -- -- 95 19 97 % --   07/29/24 2020 96/61 -- -- 93 18 97 % --   07/29/24 2015 98/53 -- -- 85 16 96 % --   07/29/24 2011 92/52 -- -- 95 18 95 % --   07/29/24 2009 (!) 71/37 -- -- (!) 45 19 96 % --   07/29/24 2001 -- -- -- (!) 32 19 94 % --   07/29/24 2000 (!) 65/40 37.6 °C (99.7 °F) Bladder (!) 32 17 95 % --   07/29/24 1959 -- -- -- (!) 35 15 95 % --   07/29/24 1958 -- -- -- (!) 44 (!) 22 94 % --   07/29/24 1957 (!) 74/42 -- -- (!) 57 18 94 % --   07/29/24 1955 -- -- -- (!) 37 15 96 % --   07/29/24 1952 94/55 -- -- (!) 108 18 96 % --   07/29/24 1947 (!) 89/54 -- -- (!) 106 16 97 % --   07/29/24 1942 91/53 -- -- (!) 105 13 97 % --   07/29/24 1940 (!) 82/51 -- -- (!) 111 16 96 % --   07/1939 (!) 79/53 -- -- (!) 106 17 96 % --   07/29/24 1937 (!) 84/50 -- -- 70 18 96 % --   07/29/24 1936 (!) 74/44 -- -- 64 16 96 % --   07/29/24 1934 (!) 74/44 -- -- 66 14 96 % --   07/29/24 1933 -- -- -- (!) 44 16 95 % --   07/29/24 1932 (!) 72/39 -- -- (!) 59 (!) 22 96 % --   07/29/24 1931 -- -- -- (!) 45 (!) 42 95 % --   07/29/24 1930 (!) 58/36 -- -- 60 (!) 21 95 % --   07/29/24 1929 -- -- -- (!) 47 (!) 25 95 % --   07/29/24 1928 (!) 69/41 -- -- 63 20 95 % --   07/29/24 1927 (!) 66/42 -- -- 60 16 95 % --   07/29/24 1926 -- -- -- 60 (!) 27 95 % --   07/29/24 1925 -- -- -- (!) 59 (!) 24 96 % --   07/29/24 1924 -- -- -- 83 14  96 % --   07/29/24 1923 -- -- -- (!) 0 -- 96 % --   07/29/24 1900 (!) 77/40 -- -- (!) 118 (!) 30 96 % --   07/29/24 1800 (!) 89/51 -- Bladder (!) 111 18 93 % --   07/29/24 1730 -- -- -- (!) 104 17 94 % --   07/29/24 1700 108/57 -- -- (!) 102 19 93 % --   07/29/24 1630 -- -- -- (!) 112 20 93 % --     General: No acute distress.   EYES: no jaundice  HEENT: OP clear   Neck: No bruits No JVD.   CVS:  RRR. S1 + S2. No M/R/G. No edema.  Resp: CTAB. No wheezing or crackles/rhonchi.  Abdomen: Soft, NT, ND,  Skin: Grossly nothing acute no obvious rashes  Neurological: Alert, Moves all extremities, no cranial nerve defects on limited exam  Extremities: Pulse 2+ in b/l LE. No cyanosis.     Data:  Laboratory studies personally reviewed by me:  Recent Results (from the past 24 hour(s))   EC-ECHOCARDIOGRAM COMPLETE W/O CONT    Collection Time: 07/29/24  4:45 PM   Result Value Ref Range    Eject.Frac. MOD BP 66.59     Eject.Frac. MOD 4C 67.42     Eject.Frac. MOD 2C 66.54     Left Ventrical Ejection Fraction 65    CBC WITHOUT DIFFERENTIAL    Collection Time: 07/29/24  7:40 PM   Result Value Ref Range    WBC 6.9 4.8 - 10.8 K/uL    RBC 3.57 (L) 4.20 - 5.40 M/uL    Hemoglobin 11.3 (L) 12.0 - 16.0 g/dL    Hematocrit 32.0 (L) 37.0 - 47.0 %    MCV 89.6 81.4 - 97.8 fL    MCH 31.7 27.0 - 33.0 pg    MCHC 35.3 32.2 - 35.5 g/dL    RDW 42.2 35.9 - 50.0 fL    Platelet Count 228 164 - 446 K/uL    MPV 10.3 9.0 - 12.9 fL   Comp Metabolic Panel    Collection Time: 07/29/24  7:40 PM   Result Value Ref Range    Sodium 135 135 - 145 mmol/L    Potassium 3.2 (L) 3.6 - 5.5 mmol/L    Chloride 100 96 - 112 mmol/L    Co2 22 20 - 33 mmol/L    Anion Gap 13.0 7.0 - 16.0    Glucose 168 (H) 65 - 99 mg/dL    Bun 11 8 - 22 mg/dL    Creatinine 0.85 0.50 - 1.40 mg/dL    Calcium 8.2 (L) 8.4 - 10.2 mg/dL    Correct Calcium 9.2 8.5 - 10.5 mg/dL    AST(SGOT) 39 12 - 45 U/L    ALT(SGPT) 54 (H) 2 - 50 U/L    Alkaline Phosphatase 111 (H) 30 - 99 U/L    Total Bilirubin 0.4  0.1 - 1.5 mg/dL    Albumin 2.8 (L) 3.2 - 4.9 g/dL    Total Protein 4.9 (L) 6.0 - 8.2 g/dL    Globulin 2.1 1.9 - 3.5 g/dL    A-G Ratio 1.3 g/dL   TROPONIN    Collection Time: 24  7:40 PM   Result Value Ref Range    Troponin T 18 6 - 19 ng/L   MAGNESIUM    Collection Time: 24  7:40 PM   Result Value Ref Range    Magnesium 1.6 1.5 - 2.5 mg/dL   PHOSPHORUS    Collection Time: 24  7:40 PM   Result Value Ref Range    Phosphorus 1.9 (L) 2.5 - 4.5 mg/dL   ESTIMATED GFR    Collection Time: 24  7:40 PM   Result Value Ref Range    GFR (CKD-EPI) 67 >60 mL/min/1.73 m 2   POCT glucose device results    Collection Time: 24  9:13 PM   Result Value Ref Range    POC Glucose, Blood 138 (H) 65 - 99 mg/dL   POCT glucose device results    Collection Time: 24  6:33 AM   Result Value Ref Range    POC Glucose, Blood 136 (H) 65 - 99 mg/dL   EKG    Collection Time: 24  7:51 AM   Result Value Ref Range    Report       [facility]    Test Date:  2024  Pt Name:    LAST TAVERAS                  Department: ICU  MRN:        [id number]                      Room:       331  Gender:     Female                       Technician: [id number]  :        [id number]                   Requested By:HARVEY MIRELES  Order #:    [id number]                    Reading MD: Diana Valerio MD    Measurements  Intervals                                Axis  Rate:       61                           P:          -9  ND:         204                          QRS:        -11  QRSD:       99                           T:          -75  QT:         503  QTc:        507    Interpretive Statements  Atrial fibrillation  Borderline low voltage, extremity leads  Abnrm T, consider ischemia, anterolateral lds  Electronically Signed On 2024 15:18:35 PDT by Diana Valerio MD     CBC WITH DIFFERENTIAL    Collection Time: 24  8:05 AM   Result Value Ref Range    WBC 7.0 4.8 - 10.8 K/uL    RBC 3.60 (L) 4.20 - 5.40 M/uL    Hemoglobin  11.4 (L) 12.0 - 16.0 g/dL    Hematocrit 32.1 (L) 37.0 - 47.0 %    MCV 89.2 81.4 - 97.8 fL    MCH 31.7 27.0 - 33.0 pg    MCHC 35.5 32.2 - 35.5 g/dL    RDW 42.8 35.9 - 50.0 fL    Platelet Count 227 164 - 446 K/uL    MPV 9.8 9.0 - 12.9 fL    Neutrophils-Polys 75.40 (H) 44.00 - 72.00 %    Lymphocytes 16.00 (L) 22.00 - 41.00 %    Monocytes 6.60 0.00 - 13.40 %    Eosinophils 1.40 0.00 - 6.90 %    Basophils 0.30 0.00 - 1.80 %    Immature Granulocytes 0.30 0.00 - 0.90 %    Nucleated RBC 0.00 0.00 - 0.20 /100 WBC    Neutrophils (Absolute) 5.24 1.82 - 7.42 K/uL    Lymphs (Absolute) 1.11 1.00 - 4.80 K/uL    Monos (Absolute) 0.46 0.00 - 0.85 K/uL    Eos (Absolute) 0.10 0.00 - 0.51 K/uL    Baso (Absolute) 0.02 0.00 - 0.12 K/uL    Immature Granulocytes (abs) 0.02 0.00 - 0.11 K/uL    NRBC (Absolute) 0.00 K/uL   Comp Metabolic Panel    Collection Time: 07/30/24  8:05 AM   Result Value Ref Range    Sodium 137 135 - 145 mmol/L    Potassium 3.2 (L) 3.6 - 5.5 mmol/L    Chloride 103 96 - 112 mmol/L    Co2 26 20 - 33 mmol/L    Anion Gap 8.0 7.0 - 16.0    Glucose 133 (H) 65 - 99 mg/dL    Bun 10 8 - 22 mg/dL    Creatinine 0.66 0.50 - 1.40 mg/dL    Calcium 8.6 8.4 - 10.2 mg/dL    Correct Calcium 9.4 8.5 - 10.5 mg/dL    AST(SGOT) 31 12 - 45 U/L    ALT(SGPT) 47 2 - 50 U/L    Alkaline Phosphatase 109 (H) 30 - 99 U/L    Total Bilirubin 0.5 0.1 - 1.5 mg/dL    Albumin 3.0 (L) 3.2 - 4.9 g/dL    Total Protein 5.0 (L) 6.0 - 8.2 g/dL    Globulin 2.0 1.9 - 3.5 g/dL    A-G Ratio 1.5 g/dL   MAGNESIUM    Collection Time: 07/30/24  8:05 AM   Result Value Ref Range    Magnesium 1.6 1.5 - 2.5 mg/dL   PHOSPHORUS    Collection Time: 07/30/24  8:05 AM   Result Value Ref Range    Phosphorus 1.1 (L) 2.5 - 4.5 mg/dL   TSH WITH REFLEX TO FT4    Collection Time: 07/30/24  8:05 AM   Result Value Ref Range    TSH 3.580 0.380 - 5.330 uIU/mL   ESTIMATED GFR    Collection Time: 07/30/24  8:05 AM   Result Value Ref Range    GFR (CKD-EPI) 86 >60 mL/min/1.73 m 2   EKG (IP)     Collection Time: 24  3:33 PM   Result Value Ref Range    Report       [facility]    Test Date:  2024  Pt Name:    LAST TAVERAS                  Department: 161  MRN:        [id number]                      Room:       T617  Gender:     Female                       Technician: [id number]  :        [id number]                   Requested By:EMELI REARDON  Order #:    [id number]                    Reading MD: Diana Valerio MD    Measurements  Intervals                                Axis  Rate:       57                           P:          25  HI:         236                          QRS:        13  QRSD:       101                          T:          -35  QT:         500  QTc:        487    Interpretive Statements  Sinus bradycardia  Borderline prolonged HI interval  Low voltage, extremity leads  Abnormal T, consider ischemia, anterior leads  Electronically Signed On 2024 15:39:42 PDT by Diana Valerio MD         EKG 24 0703 AF RVR, st changes rate related  EKG 24 1533 sinus napoleon 57 bpm, abnormal TWI  Telemetry overnight episodes of asystole >5 seconds    All pertinent features of laboratory and imaging reviewed including primary images where applicable    TTE 24  CONCLUSIONS  Prior study 10/07/20, compared to the report of the prior study, there   has been no significant change.   Normal left ventricular systolic function.  The left ventricular ejection fraction is visually estimated to be 65%.  No significant valvular abnormalities.     Principal Problem:    Ileus (HCC) (POA: Yes)  Active Problems:    History of atrial fibrillation (POA: Yes)    Mild late onset Alzheimer's dementia without behavioral disturbance, psychotic disturbance, mood disturbance, or anxiety (HCC) (POA: Yes)    Acute GI bleeding (POA: Yes)    High anion gap metabolic acidosis (POA: Yes)    Hyponatremia (POA: Yes)    Hyperglycemia (POA: Yes)    Elevated liver enzymes (POA: Yes)    SVT (supraventricular tachycardia)  (HCC) (POA: No)    Shock (HCC) (POA: Yes)  Resolved Problems:    * No resolved hospital problems. *      Assessment / Plan:  83 year old woman PMH atrial fibrillation s/p ablation 12/2017, HLD, hx TIA, alzheimer's presents with marcela tarry stools and rectal pain found ileus and constipation/impaction. Hospital stay c/b flash pulmonary edema and AF RVR. Treated with rate controlling agents c/b bradycardia and shock requiring vasopressor support. Consult for tachy-napoleon syndrome.     -supportive care per ICU  -vasopressor with chronotropic effect for HR >60 and MAP >65 as needed  -eventually PPM when more stable and ok with GI. Discussed with EP.  -systemic anticoagulation for cva prevention when ok with GI. Please avoid heparin products.     I personally discussed her case with Dr Tovar.    It is my pleasure to participate in the care of Ms. Herndon.  Please do not hesitate to contact me with questions or concerns.    Mikey Turner MD  Cardiologist [facility]    Gloria Herndon is critically ill and she is at high risk for clinical deterioration, worsening vital organ dysfunction, and death without the above critical care interventions.  Critical care time 46 minutes. This time was spent at the bedside evaluating the patient's chart, their labs,   imaging, physical exam and discussion with Dr Tovar as well as the bedside nurse, family and formulating an assessment and plan.

## 2024-07-30 NOTE — PROGRESS NOTES
Discharge Instructions    Discharged to other by medical transportation with escort. Discharged via ambulance, hospital escort: Yes.  Special equipment needed: Not Applicable    All personal belongings sent with  [place]. Updated XAVIER De Leon from [facility] about departure. Norepinephrine infusing at 0.07. 4L oxymask in place.

## 2024-07-31 ENCOUNTER — APPOINTMENT (OUTPATIENT)
Dept: RADIOLOGY | Facility: MEDICAL CENTER | Age: 84
DRG: 308 | End: 2024-07-31
Payer: MEDICARE

## 2024-07-31 VITALS
BODY MASS INDEX: 23.04 KG/M2 | HEIGHT: 67 IN | TEMPERATURE: 98.3 F | WEIGHT: 146.83 LBS | RESPIRATION RATE: 21 BRPM | OXYGEN SATURATION: 93 % | DIASTOLIC BLOOD PRESSURE: 56 MMHG | SYSTOLIC BLOOD PRESSURE: 112 MMHG | HEART RATE: 66 BPM

## 2024-07-31 PROBLEM — I49.5 TACHY-BRADY SYNDROME (HCC): Status: ACTIVE | Noted: 2024-07-31

## 2024-07-31 PROBLEM — R57.0 CARDIOGENIC SHOCK (HCC): Status: ACTIVE | Noted: 2024-07-30

## 2024-07-31 PROBLEM — I48.11 LONGSTANDING PERSISTENT ATRIAL FIBRILLATION (HCC): Status: ACTIVE | Noted: 2024-07-31

## 2024-07-31 PROBLEM — K62.6 STERCORAL ULCER OF ANUS: Status: ACTIVE | Noted: 2024-07-31

## 2024-07-31 PROBLEM — E87.6 HYPOKALEMIA: Status: ACTIVE | Noted: 2024-07-31

## 2024-07-31 PROBLEM — J96.01 ACUTE RESPIRATORY FAILURE WITH HYPOXIA (HCC): Status: ACTIVE | Noted: 2024-07-31

## 2024-07-31 LAB
ALBUMIN SERPL BCP-MCNC: 2.5 G/DL (ref 3.2–4.9)
ALBUMIN/GLOB SERPL: 1 G/DL
ALP SERPL-CCNC: 121 U/L (ref 30–99)
ALT SERPL-CCNC: 49 U/L (ref 2–50)
ANION GAP SERPL CALC-SCNC: 10 MMOL/L (ref 7–16)
AST SERPL-CCNC: 45 U/L (ref 12–45)
BASOPHILS # BLD AUTO: 0.5 % (ref 0–1.8)
BASOPHILS # BLD: 0.03 K/UL (ref 0–0.12)
BILIRUB SERPL-MCNC: 0.5 MG/DL (ref 0.1–1.5)
BUN SERPL-MCNC: 10 MG/DL (ref 8–22)
CALCIUM ALBUM COR SERPL-MCNC: 9.2 MG/DL (ref 8.5–10.5)
CALCIUM SERPL-MCNC: 8 MG/DL (ref 8.5–10.5)
CHLORIDE SERPL-SCNC: 103 MMOL/L (ref 96–112)
CO2 SERPL-SCNC: 23 MMOL/L (ref 20–33)
CREAT SERPL-MCNC: 0.51 MG/DL (ref 0.5–1.4)
EOSINOPHIL # BLD AUTO: 0.17 K/UL (ref 0–0.51)
EOSINOPHIL NFR BLD: 2.8 % (ref 0–6.9)
ERYTHROCYTE [DISTWIDTH] IN BLOOD BY AUTOMATED COUNT: 44 FL (ref 35.9–50)
GFR SERPLBLD CREATININE-BSD FMLA CKD-EPI: 92 ML/MIN/1.73 M 2
GLOBULIN SER CALC-MCNC: 2.4 G/DL (ref 1.9–3.5)
GLUCOSE SERPL-MCNC: 120 MG/DL (ref 65–99)
HCT VFR BLD AUTO: 32.3 % (ref 37–47)
HGB BLD-MCNC: 11 G/DL (ref 12–16)
IMM GRANULOCYTES # BLD AUTO: 0.03 K/UL (ref 0–0.11)
IMM GRANULOCYTES NFR BLD AUTO: 0.5 % (ref 0–0.9)
LYMPHOCYTES # BLD AUTO: 1.56 K/UL (ref 1–4.8)
LYMPHOCYTES NFR BLD: 25.9 % (ref 22–41)
MAGNESIUM SERPL-MCNC: 2 MG/DL (ref 1.5–2.5)
MCH RBC QN AUTO: 30.9 PG (ref 27–33)
MCHC RBC AUTO-ENTMCNC: 34.1 G/DL (ref 32.2–35.5)
MCV RBC AUTO: 90.7 FL (ref 81.4–97.8)
MONOCYTES # BLD AUTO: 0.41 K/UL (ref 0–0.85)
MONOCYTES NFR BLD AUTO: 6.8 % (ref 0–13.4)
NEUTROPHILS # BLD AUTO: 3.82 K/UL (ref 1.82–7.42)
NEUTROPHILS NFR BLD: 63.5 % (ref 44–72)
NRBC # BLD AUTO: 0 K/UL
NRBC BLD-RTO: 0 /100 WBC (ref 0–0.2)
PHOSPHATE SERPL-MCNC: 2.3 MG/DL (ref 2.5–4.5)
PLATELET # BLD AUTO: 220 K/UL (ref 164–446)
PMV BLD AUTO: 9.7 FL (ref 9–12.9)
POTASSIUM SERPL-SCNC: 3.2 MMOL/L (ref 3.6–5.5)
PROT SERPL-MCNC: 4.9 G/DL (ref 6–8.2)
RBC # BLD AUTO: 3.56 M/UL (ref 4.2–5.4)
SODIUM SERPL-SCNC: 136 MMOL/L (ref 135–145)
WBC # BLD AUTO: 6 K/UL (ref 4.8–10.8)

## 2024-07-31 PROCEDURE — 700102 HCHG RX REV CODE 250 W/ 637 OVERRIDE(OP)

## 2024-07-31 PROCEDURE — 99233 SBSQ HOSP IP/OBS HIGH 50: CPT | Performed by: INTERNAL MEDICINE

## 2024-07-31 PROCEDURE — A9270 NON-COVERED ITEM OR SERVICE: HCPCS

## 2024-07-31 PROCEDURE — 84100 ASSAY OF PHOSPHORUS: CPT

## 2024-07-31 PROCEDURE — 700101 HCHG RX REV CODE 250: Performed by: INTERNAL MEDICINE

## 2024-07-31 PROCEDURE — 770000 HCHG ROOM/CARE - INTERMEDIATE ICU *

## 2024-07-31 PROCEDURE — 85025 COMPLETE CBC W/AUTO DIFF WBC: CPT

## 2024-07-31 PROCEDURE — 99233 SBSQ HOSP IP/OBS HIGH 50: CPT | Performed by: STUDENT IN AN ORGANIZED HEALTH CARE EDUCATION/TRAINING PROGRAM

## 2024-07-31 PROCEDURE — 99232 SBSQ HOSP IP/OBS MODERATE 35: CPT | Performed by: INTERNAL MEDICINE

## 2024-07-31 PROCEDURE — 71045 X-RAY EXAM CHEST 1 VIEW: CPT

## 2024-07-31 PROCEDURE — 80053 COMPREHEN METABOLIC PANEL: CPT

## 2024-07-31 PROCEDURE — 83735 ASSAY OF MAGNESIUM: CPT

## 2024-07-31 RX ORDER — CALCIUM CARBONATE 500 MG/1
500 TABLET, CHEWABLE ORAL 3 TIMES DAILY PRN
Status: DISCONTINUED | OUTPATIENT
Start: 2024-07-31 | End: 2024-08-04 | Stop reason: HOSPADM

## 2024-07-31 RX ORDER — POTASSIUM CHLORIDE 1500 MG/1
40 TABLET, EXTENDED RELEASE ORAL 2 TIMES DAILY
Status: COMPLETED | OUTPATIENT
Start: 2024-07-31 | End: 2024-07-31

## 2024-07-31 RX ORDER — LIDOCAINE 4 G/G
1 PATCH TOPICAL EVERY 24 HOURS
Status: DISCONTINUED | OUTPATIENT
Start: 2024-07-31 | End: 2024-08-03

## 2024-07-31 RX ADMIN — DIBASIC SODIUM PHOSPHATE, MONOBASIC POTASSIUM PHOSPHATE AND MONOBASIC SODIUM PHOSPHATE 250 MG: 852; 155; 130 TABLET ORAL at 07:34

## 2024-07-31 RX ADMIN — Medication 5 MG: at 21:37

## 2024-07-31 RX ADMIN — POTASSIUM CHLORIDE 40 MEQ: 1500 TABLET, EXTENDED RELEASE ORAL at 17:56

## 2024-07-31 RX ADMIN — ACETAMINOPHEN 650 MG: 325 TABLET ORAL at 09:28

## 2024-07-31 RX ADMIN — ACETAMINOPHEN 650 MG: 325 TABLET ORAL at 17:23

## 2024-07-31 RX ADMIN — LIDOCAINE 1 PATCH: 4 PATCH TOPICAL at 13:47

## 2024-07-31 RX ADMIN — APIXABAN 2.5 MG: 2.5 TABLET, FILM COATED ORAL at 05:12

## 2024-07-31 RX ADMIN — APIXABAN 2.5 MG: 2.5 TABLET, FILM COATED ORAL at 17:56

## 2024-07-31 RX ADMIN — POTASSIUM CHLORIDE 40 MEQ: 1500 TABLET, EXTENDED RELEASE ORAL at 07:34

## 2024-07-31 RX ADMIN — POLYETHYLENE GLYCOL 3350 1 PACKET: 17 POWDER, FOR SOLUTION ORAL at 11:39

## 2024-07-31 RX ADMIN — ACETAMINOPHEN 650 MG: 325 TABLET ORAL at 21:37

## 2024-07-31 RX ADMIN — POLYETHYLENE GLYCOL 3350 1 PACKET: 17 POWDER, FOR SOLUTION ORAL at 17:56

## 2024-07-31 RX ADMIN — POLYETHYLENE GLYCOL 3350 1 PACKET: 17 POWDER, FOR SOLUTION ORAL at 05:12

## 2024-07-31 ASSESSMENT — ENCOUNTER SYMPTOMS
HEADACHES: 0
MYALGIAS: 1
DIARRHEA: 0
COUGH: 0
BACK PAIN: 0
CHILLS: 0
ABDOMINAL PAIN: 0
DOUBLE VISION: 0
PALPITATIONS: 0
SHORTNESS OF BREATH: 0
HALLUCINATIONS: 0
TINGLING: 0
WEAKNESS: 1
VOMITING: 0
FOCAL WEAKNESS: 0
EYE PAIN: 0
NAUSEA: 0
DIZZINESS: 0
PHOTOPHOBIA: 0
NECK PAIN: 0
SPEECH CHANGE: 0
TREMORS: 0
SPUTUM PRODUCTION: 0
FEVER: 0
SENSORY CHANGE: 0
WEIGHT LOSS: 0
BLURRED VISION: 0
ORTHOPNEA: 0
CONSTIPATION: 0

## 2024-07-31 ASSESSMENT — PAIN DESCRIPTION - PAIN TYPE
TYPE: ACUTE PAIN
TYPE: CHRONIC PAIN

## 2024-07-31 ASSESSMENT — LIFESTYLE VARIABLES: SUBSTANCE_ABUSE: 0

## 2024-07-31 NOTE — CARE PLAN
The patient is Watcher - Medium risk of patient condition declining or worsening    Shift Goals  Clinical Goals: Hemodynamics  Patient Goals: Comfort  Family Goals: Updates    Progress made toward(s) clinical / shift goals:    Problem: Knowledge Deficit - Standard  Goal: Patient and family/care givers will demonstrate understanding of plan of care, disease process/condition, diagnostic tests and medications  Outcome: Progressing     Problem: Pain - Standard  Goal: Alleviation of pain or a reduction in pain to the patient’s comfort goal  Outcome: Progressing     Problem: Hemodynamics  Goal: Patient's hemodynamics, fluid balance and neurologic status will be stable or improve  Outcome: Progressing     Problem: Respiratory  Goal: Patient will achieve/maintain optimum respiratory ventilation and gas exchange  Outcome: Progressing     Problem: Fall Risk  Goal: Patient will remain free from falls  Outcome: Progressing       Patient is not progressing towards the following goals:

## 2024-07-31 NOTE — PROGRESS NOTES
Critical Care Progress Note    Date of admission  7/30/2024    Chief Complaint  83 y.o. female with a history of A-fib s/p ablation in 2017 on Eliquis, metoprolol, and flecainide.  Recent diagnosis of Alzheimer's dementia.  She was admitted to [facility] for constipation and was found to have stercoral ulcer.  GI was consulted and aggressive bowel regimen was instituted.  She was transferred to the ICU at [facility] when she developed tachybradycardia syndrome, unfortunately developed flash pulmonary edema and was intubated on 7/30/2024.  She underwent fecal disimpaction while under anesthesia and then shortly thereafter developed bradycardia and hypotension.  All cardiac medications were discontinued and she was transferred to [facility] for cardiology elation.    Hospital Course  7/30 - transferred to [facility], currently on mechanical ventilation.  Cardiology consulted.    Interval Problem Update  Reviewed last 24 hour events:  Tmax: 100.4 °F  Diet: N.p.o. for now  Vasopressors: None    Infusions: None    Antibx: None    Intake / Output  Urine Output past 24 hours: 400 mL    Lab Trends  Hgb stable ~11    K 3.2    Review of Systems  Review of Systems   Unable to perform ROS: Critical illness        Vital Signs for last 24 hours   Temp:  [37 °C (98.6 °F)-37.9 °C (100.2 °F)] 37 °C (98.6 °F)  Pulse:  [45-64] 52  Resp:  [12-34] 18  BP: ()/(35-63) 99/49  SpO2:  [91 %-98 %] 95 %    Hemodynamic parameters for last 24 hours       Respiratory Information for the last 24 hours       Physical Exam   Physical Exam  Vitals and nursing note reviewed. Exam conducted with a chaperone present.   Constitutional:       General: She is not in acute distress.     Appearance: Normal appearance. She is not ill-appearing.   HENT:      Head: Normocephalic.      Mouth/Throat:      Mouth: Mucous membranes are moist.   Eyes:      Extraocular Movements: Extraocular movements intact.   Cardiovascular:      Rate and Rhythm: Bradycardia  present. Rhythm irregular.      Pulses: Normal pulses.   Pulmonary:      Effort: Pulmonary effort is normal. No respiratory distress.   Abdominal:      General: There is no distension.      Palpations: Abdomen is soft.      Tenderness: There is abdominal tenderness. There is no guarding or rebound.   Musculoskeletal:         General: Normal range of motion.      Cervical back: Normal range of motion and neck supple.   Skin:     General: Skin is warm and dry.      Capillary Refill: Capillary refill takes less than 2 seconds.   Neurological:      General: No focal deficit present.      Mental Status: She is alert and oriented to person, place, and time. Mental status is at baseline.         Medications  Current Facility-Administered Medications   Medication Dose Route Frequency Provider Last Rate Last Admin    potassium chloride SA (Kdur) tablet 40 mEq  40 mEq Oral BID Jake Romero M.D.   40 mEq at 07/31/24 0734    norepinephrine (Levophed) 8 mg in 250 mL NS infusion (premix)  0-1 mcg/kg/min (Ideal) Intravenous Continuous Isai Cardona D.O.   Stopped at 07/31/24 0400    [Held by provider] flecainide (Tambocor) tablet 100 mg  100 mg Oral BID Jake Romero M.D.        [Held by provider] metoprolol SR (Toprol XL) tablet 100 mg  100 mg Oral DAILY Jake Romero M.D.        apixaban (Eliquis) tablet 2.5 mg  2.5 mg Oral BID Jake Romero M.D.   2.5 mg at 07/31/24 0512    polyethylene glycol/lytes (Miralax) Packet 1 Packet  1 Packet Oral TID Jake Romero M.D.   1 Packet at 07/31/24 0512    melatonin tablet 5 mg  5 mg Oral Nightly Barney Shah M.D.   5 mg at 07/30/24 2107    acetaminophen (Tylenol) tablet 650 mg  650 mg Oral Q4HRS PRN Barney Shah M.D.   650 mg at 07/31/24 0928       Fluids    Intake/Output Summary (Last 24 hours) at 7/31/2024 0930  Last data filed at 7/31/2024 0600  Gross per 24 hour   Intake 390.84 ml   Output 575 ml   Net -184.16 ml       Laboratory  Recent Labs     07/29/24  0946   ISTATAPH 7.336*    ISTATAPCO2 23.7*   ISTATAPO2 70   ISTATATCO2 13*   DYVIBTX4WMS 93   ISTATARTHCO3 12.7*   ISTATARTBE -11*   ISTATTEMP 99.0 F   ISTATFIO2 100   ISTATSPEC Arterial   ISTATAPHTC 7.333*   [id number] 71         Recent Labs     07/29/24  1940 07/30/24  0805 07/30/24  1522 07/31/24  0400   SODIUM 135 137 137 136   POTASSIUM 3.2* 3.2* 3.6 3.2*   CHLORIDE 100 103 103 103   CO2 22 26 21 23   BUN 11 10 9 10   CREATININE 0.85 0.66 0.60 0.51   MAGNESIUM 1.6 1.6 2.6* 2.0   PHOSPHORUS 1.9* 1.1*  --  2.3*   CALCIUM 8.2* 8.6 8.6 8.0*     Recent Labs     07/30/24  0805 07/30/24  1522 07/31/24  0400   ALTSGPT 47 49 49   ASTSGOT 31 51* 45   ALKPHOSPHAT 109* 127* 121*   TBILIRUBIN 0.5 0.5 0.5   GLUCOSE 133* 129* 120*     Recent Labs     07/30/24  0805 07/30/24  1522 07/30/24  1548 07/31/24  0400   WBC 7.0  --  6.9 6.0   NEUTSPOLYS 75.40*  --  70.50 63.50   LYMPHOCYTES 16.00*  --  20.80* 25.90   MONOCYTES 6.60  --  6.40 6.80   EOSINOPHILS 1.40  --  1.70 2.80   BASOPHILS 0.30  --  0.30 0.50   ASTSGOT 31 51*  --  45   ALTSGPT 47 49  --  49   ALKPHOSPHAT 109* 127*  --  121*   TBILIRUBIN 0.5 0.5  --  0.5     Recent Labs     07/30/24  0805 07/30/24  1548 07/31/24  0400   RBC 3.60* 3.53* 3.56*   HEMOGLOBIN 11.4* 11.1* 11.0*   HEMATOCRIT 32.1* 32.3* 32.3*   PLATELETCT 227 230 220   PROTHROMBTM  --  14.3  --    INR  --  1.10  --        Imaging  X-Ray:  I have personally reviewed the images and compared with prior images. and My impression is: Mild bilateral pulmonary edema    Assessment/Plan  * Tachy-napoleon syndrome (HCC)  Assessment & Plan  Evaluated by cardiology, they are continuing to follow  No plans for temporary or permanent pacemaker at this time  Hold cardiac medications (metoprolol, flecainide)  Continue telemetry    Acute respiratory failure with hypoxia (HCC)  Assessment & Plan  Likely due to flash pulmonary edema    Intubated date: 7/29 @ [facility]  Reason intubated: Acute hypoxic respiratory failure  Extubated later the same  night    RT protocols  Incentive spirometry    Longstanding persistent atrial fibrillation (HCC)  Assessment & Plan  Continue Eliquis  Hold flecainide and metoprolol  Telemetry    Stercoral ulcer of rectum  Assessment & Plan  Underwent fecal disimpaction under anesthesia  Continue aggressive bowel medications    Cardiogenic shock (HCC)  Assessment & Plan  Likely due to bradycardia  She remains in sinus bradycardia but shock has resolved  She is now off norepinephrine    Hypokalemia  Assessment & Plan  Check BMP daily  Goal is 4  Replete as necessary          VTE:   Therapeutic apixaban  Ulcer: Not Indicated  Lines: None    I have performed a physical exam and reviewed and updated ROS and Plan today (7/31/2024). In review of yesterday's note (7/30/2024), there are no changes except as documented above.     Discussed patient condition and risk of morbidity and/or mortality with RN, RT, Pharmacy, Charge nurse / hot rounds, Patient, and cardiology    Ok to transfer patient out of ICU today. [facility] will sign off at transfer. Please call with questions.

## 2024-07-31 NOTE — ASSESSMENT & PLAN NOTE
Evaluated by cardiology, they are continuing to follow  No plans for temporary or permanent pacemaker at this time  Hold cardiac medications (metoprolol, flecainide)  Continue telemetry

## 2024-07-31 NOTE — CARE PLAN
The patient is Watcher - Medium risk of patient condition declining or worsening    Shift Goals  Clinical Goals: Hemodynamic stability    Progress made toward(s) clinical / shift goals:    Problem: Knowledge Deficit - Standard  Goal: Patient and family/care givers will demonstrate understanding of plan of care, disease process/condition, diagnostic tests and medications  7/30/2024 1924 by Hilda Dawkins R.N.  Outcome: Progressing  7/30/2024 1923 by Hilda Dawkins R.N.  Outcome: Progressing     Problem: Pain - Standard  Goal: Alleviation of pain or a reduction in pain to the patient’s comfort goal  Outcome: Progressing     Problem: Hemodynamics  Goal: Patient's hemodynamics, fluid balance and neurologic status will be stable or improve  Outcome: Progressing     Problem: Respiratory  Goal: Patient will achieve/maintain optimum respiratory ventilation and gas exchange  Outcome: Progressing       Patient is not progressing towards the following goals:

## 2024-07-31 NOTE — PROGRESS NOTES
Pharmacy Medication Reconciliation      ~Medication reconciliation updated and complete per patient spouse Milan  ~Allergies have been verified and updated     ~Patient home pharmacy :  [facility] [phone]      ~Anticoagulants (rivaroxaban, apixaban, edoxaban, dabigatran, warfarin, enoxaparin) taken in the last 14 days? Yes  ~Anticoagulant: Eliquis 2.5mg  , Last dose: 7/25/24  Patient received Enoxaparin 40mg/0.4ml on 7/30/24 in Hospital             Patient took a five day course of Cephalexin 500mg BID started 7/8/24 and finished 7/12/24  Patient takes Acyclovir 400 TID for an outbreak and spouse said she might have taken it last week but he was not certain.

## 2024-07-31 NOTE — CARE PLAN
The patient is Watcher - Medium risk of patient condition declining or worsening    Shift Goals  Clinical Goals: hemodynamic stability, mobility  Patient Goals: comfort  Family Goals: updates    Progress made toward(s) clinical / shift goals:    Problem: Pain - Standard  Goal: Alleviation of pain or a reduction in pain to the patient’s comfort goal  Outcome: Progressing     Problem: Hemodynamics  Goal: Patient's hemodynamics, fluid balance and neurologic status will be stable or improve  Outcome: Progressing     Problem: Fall Risk  Goal: Patient will remain free from falls  Outcome: Progressing     Problem: Skin Integrity  Goal: Skin integrity is maintained or improved  Outcome: Progressing     Problem: Bowel Elimination  Goal: Establish and maintain regular bowel function  Outcome: Progressing       Patient is not progressing towards the following goals:

## 2024-07-31 NOTE — CONSULTS
Cardiology Initial Consultation    Date of Service  7/30/2024    Referring Physician  Isai Cardona D.O.    Reason for Consultation  Atrial fibrillation with concern for tachybradycardia syndrome    History of Presenting Illness  Gloria Herndon is a 83 y.o. female with a past medical history of paroxysmal atrial fibrillation with prior ablation therapy back in December 2017, dyslipidemia, TIA, dementia who presented 7/30/2024 with significant constipation.    Patient initially presented to [facility] for worsening abdominal pain found to have significant rectal pain with concern for ileus and constipation/impaction.  She underwent manual disimpaction.  During her hospital stay she underwent a CT of the abdomen pelvis on July 27 which showed constipation with large volume stool in the rectal vault and rectal wall thickening and adjacent fat stranding consistent with fecal impaction and proctocolitis.  Patient ultimately was noted to have on July 29 significant supraventricular tachycardia with her severe constipation that was treated with IV fluids and diltiazem ultimately developing flash pulmonary edema requiring high flow nasal cannula off and ultimately patient required a trial of intubation liberation from the ventilator on the same day on 7/29/2024 she had multiple episodes of bradycardia not associated with disimpaction or bowel movement and was noted to have a single 12-second pause stating atropine and started on Levophed.  Ultimately she was seen by Dr. Daniel BARAKAT and transferred to [facility] for further evaluation and possible pacemaker placement for tachybradycardia syndrome.  Since arrival patient has been off of her Levophed and has remained in sinus rhythm and sinus bradycardia without significant pauses.  She did have noted atrial fibrillation with rapid ventricular rate at [facility].  At this time Case was discussed with the electrophysiology  nurse practitioner and given concern for ongoing GI symptomatology and recovery from likely proctocolitis and fecal impaction the decision was made to clinically per ED record.    Prior cardiovascular workup consists of echocardiogram performed on 729 20 65%    Review of Systems  Review of Systems    Past Medical History   has a past medical history of Arrhythmia, Asthma, Atrial fibrillation (HCC), High cholesterol, History of cardiac radiofrequency ablation (12/01/2017), and TIA (transient ischemic attack) (10/2020).    Surgical History   has a past surgical history that includes other cardiac surgery; cholecystectomy (08/08/2003); and other (Bilateral).    Family History  family history is not on file.    Social History   reports that she has never smoked. She has never used smokeless tobacco. She reports that she does not currently use alcohol. She reports that she does not currently use drugs.    Medications  Prior to Admission Medications   Prescriptions Last Dose Informant Patient Reported? Taking?   Evolocumab (REPATHA) 140 MG/ML Solution Auto-injector SubQ injection pen 7/21/2024 at am Family Member No No   Sig: Inject 1 mL under the skin every 14 days.   acyclovir (ZOVIRAX) 400 MG tablet 1 week ago at unk Family Member Yes No   Sig: Take 400 mg by mouth 3 times a day as needed (Outbreak). Take for 5 days at first sign of cold sore.   apixaban (ELIQUIS) 2.5mg Tab 7/25/2024 at pm Family Member No No   Sig: Take 1 Tablet by mouth 2 times a day.   bisacodyl (DULCOLAX) 10 MG Suppos unk at unk Family Member No No   Sig: Insert 1 Suppository into the rectum every day.   Patient taking differently: Insert 10 mg into the rectum 1 time a day as needed (constipation).   cephALEXin (KEFLEX) 500 MG Cap 7/12/24 at finished Family Member Yes Yes   Sig: Take 500 mg by mouth 2 times a day. 5 day course started 7/8/24   clonazePAM (KLONOPIN) 0.5 MG Tab 7/25/2024 at pm Family Member Yes No   Sig: Take 0.5 mg by mouth every  evening as needed (insomnia).   enoxaparin (LOVENOX) 40 MG/0.4ML Solution Prefilled Syringe inj 7/30/2024 at 1157 Family Member No Yes   Sig: Inject 40 mg under the skin every day at 6 PM.   flecainide (TAMBOCOR) 100 MG Tab 7/25/2024 at pm Family Member No No   Sig: Take 1 Tablet by mouth 2 times a day.   hydrocortisone 2.5 % Ointment 7/25/2024 at pm Family Member Yes Yes   Sig: Apply 1 Application topically at bedtime as needed (sore bottom).   metoprolol SR (TOPROL XL) 100 MG TABLET SR 24 HR 7/25/2024 at am Family Member No No   Sig: Take 1 Tablet by mouth every day.      Facility-Administered Medications: None       Allergies  Allergies   Allergen Reactions    Blue Dyes Vomiting    Green Dyes Vomiting    Iodine Hives    Iodine Solution [Povidone Iodine] Unspecified     Red spots, pain    Omeprazole Unspecified     Urinary incontinence     Tape Unspecified     Welts on skin, paper tape is ok       Vital signs in last 24 hours  Temp:  [37.6 °C (99.7 °F)-37.7 °C (99.9 °F)] 37.6 °C (99.7 °F)  Pulse:  [54-59] 54  Resp:  [14-32] 14  BP: ()/(47-61) 97/47  SpO2:  [95 %-98 %] 98 %    Physical Exam  Physical Exam  Vitals and nursing note reviewed.   Constitutional:       Appearance: Normal appearance.   HENT:      Head: Normocephalic and atraumatic.      Nose: Nose normal.      Mouth/Throat:      Mouth: Mucous membranes are moist.      Pharynx: Oropharynx is clear.   Eyes:      Pupils: Pupils are equal, round, and reactive to light.   Cardiovascular:      Rate and Rhythm: Normal rate and regular rhythm.      Heart sounds: No murmur heard.     No friction rub. No gallop.   Pulmonary:      Effort: Pulmonary effort is normal.      Breath sounds: Normal breath sounds.   Abdominal:      Palpations: Abdomen is soft.      Tenderness: There is abdominal tenderness.      Comments: Hypoactive bowel sounds   Musculoskeletal:         General: Normal range of motion.      Cervical back: Normal range of motion and neck supple.    Skin:     General: Skin is warm and dry.   Neurological:      General: No focal deficit present.      Mental Status: She is alert and oriented to person, place, and time. Mental status is at baseline.   Psychiatric:         Mood and Affect: Mood normal.         Behavior: Behavior normal.         Thought Content: Thought content normal.         Judgment: Judgment normal.         Lab Review  Lab Results   Component Value Date/Time    WBC 6.9 07/30/2024 03:48 PM    RBC 3.53 (L) 07/30/2024 03:48 PM    HEMOGLOBIN 11.1 (L) 07/30/2024 03:48 PM    HEMATOCRIT 32.3 (L) 07/30/2024 03:48 PM    MCV 91.5 07/30/2024 03:48 PM    MCH 31.4 07/30/2024 03:48 PM    MCHC 34.4 07/30/2024 03:48 PM    MPV 10.1 07/30/2024 03:48 PM      Lab Results   Component Value Date/Time    SODIUM 137 07/30/2024 03:22 PM    POTASSIUM 3.6 07/30/2024 03:22 PM    CHLORIDE 103 07/30/2024 03:22 PM    CO2 21 07/30/2024 03:22 PM    GLUCOSE 129 (H) 07/30/2024 03:22 PM    BUN 9 07/30/2024 03:22 PM    CREATININE 0.60 07/30/2024 03:22 PM      Lab Results   Component Value Date/Time    ASTSGOT 51 (H) 07/30/2024 03:22 PM    ALTSGPT 49 07/30/2024 03:22 PM     Lab Results   Component Value Date/Time    CHOLSTRLTOT 179 10/07/2020 05:50 AM    LDL 81 10/07/2020 05:50 AM    HDL 81 10/07/2020 05:50 AM    TRIGLYCERIDE 97 07/29/2024 02:30 AM    TROPONINT 16 07/30/2024 03:22 PM       Recent Labs     07/29/24  0230   NTPROBNP 1816*       Cardiac Imaging and Procedures Review  EKG: Sinus bradycardia with borderline first-degree AV block and nonspecific ST-T wave  Echocardiogram: As noted above    Assessment/Plan  1.  Fecal impaction with concern for ileus/proctocolitis status post manual disimpaction  2.  Paroxysmal atrial fibrillation with rapid ventricular rate  3.  Concern for tachybradycardia syndrome  4.  Prior TIA  5.  Alzheimer's disease  6.  Sinus bradycardia.    Clinically, the patient is here with pressor support.  At this time the patient remains in a sinus rhythm  and sinus bradycardia.  At this time, I would not recommend urgent temporary pacemaker wire or permanent pacemaker implant.  We will review her home medications which consist of metoprolol 100 mg daily, flecainide 100 mg twice daily and renal and age adjusted Eliquis therapy.  We will hold flecainide and metoprolol therapy at this time.  Regarding her dyslipidemia we will revisit Repatha therapy.    In terms of other medical conditions I will defer to the primary medicine service.    Thank you for allowing me to participate in the care of this patient.    I will continue to follow this patient    Please contact me with any questions.    Sreedhar Flynn D.O.   Cardiologist, [facility]  [phone]

## 2024-07-31 NOTE — ASSESSMENT & PLAN NOTE
Likely due to bradycardia  She remains in sinus bradycardia but shock has resolved  She is now off norepinephrine

## 2024-07-31 NOTE — ASSESSMENT & PLAN NOTE
Likely due to flash pulmonary edema    Intubated date: 7/29 @ [facility]  Reason intubated: Acute hypoxic respiratory failure  Extubated later the same night    RT protocols  Incentive spirometry

## 2024-07-31 NOTE — HOSPITAL COURSE
7/30 - transferred to [facility], currently on mechanical ventilation.  Cardiology consulted.

## 2024-07-31 NOTE — PROGRESS NOTES
Cardiology Follow Up Progress Note    Date of Service  7/31/2024    Attending Physician  Rob Laurent M.D.    Chief Complaint   Rectal/fecal impaction with subsequent pause and noted paroxysmal atrial fibrillation    HPI  Gloria Herndon is a 83 y.o. female with a past medical history of paroxysmal atrial fibrillation with prior ablation therapy back in December 2017, dyslipidemia, TIA, dementia who presented 7/30/2024 with significant constipation     Interim Events  No acute overnight events.  No acute events per nursing staff.  Telemetry monitor demonstrated predominant sinus rhythm with intermittent sinus bradycardia during presumptive sleep hours.    Review of Systems  Review of Systems    Vital signs in last 24 hours  Temp:  [37 °C (98.6 °F)-37.9 °C (100.2 °F)] 37 °C (98.6 °F)  Pulse:  [45-64] 52  Resp:  [12-34] 18  BP: ()/(35-63) 99/49  SpO2:  [91 %-98 %] 95 %    Physical Exam  Physical Exam  Vitals and nursing note reviewed.   Constitutional:       Appearance: Normal appearance.   HENT:      Head: Normocephalic and atraumatic.      Nose: Nose normal.      Mouth/Throat:      Mouth: Mucous membranes are moist.      Pharynx: Oropharynx is clear.   Eyes:      Pupils: Pupils are equal, round, and reactive to light.   Cardiovascular:      Rate and Rhythm: Normal rate and regular rhythm.      Heart sounds: No murmur heard.     No friction rub. No gallop.   Pulmonary:      Effort: Pulmonary effort is normal.      Breath sounds: Normal breath sounds.   Abdominal:      Palpations: Abdomen is soft.      Tenderness: There is abdominal tenderness.   Musculoskeletal:         General: Normal range of motion.      Cervical back: Normal range of motion and neck supple.   Skin:     General: Skin is warm and dry.   Neurological:      General: No focal deficit present.      Mental Status: She is alert and oriented to person, place, and time. Mental status is at baseline.   Psychiatric:         Mood and Affect:  Mood normal.         Behavior: Behavior normal.         Thought Content: Thought content normal.         Judgment: Judgment normal.         Lab Review  Lab Results   Component Value Date/Time    WBC 6.0 07/31/2024 04:00 AM    RBC 3.56 (L) 07/31/2024 04:00 AM    HEMOGLOBIN 11.0 (L) 07/31/2024 04:00 AM    HEMATOCRIT 32.3 (L) 07/31/2024 04:00 AM    MCV 90.7 07/31/2024 04:00 AM    MCH 30.9 07/31/2024 04:00 AM    MCHC 34.1 07/31/2024 04:00 AM    MPV 9.7 07/31/2024 04:00 AM      Lab Results   Component Value Date/Time    SODIUM 136 07/31/2024 04:00 AM    POTASSIUM 3.2 (L) 07/31/2024 04:00 AM    CHLORIDE 103 07/31/2024 04:00 AM    CO2 23 07/31/2024 04:00 AM    GLUCOSE 120 (H) 07/31/2024 04:00 AM    BUN 10 07/31/2024 04:00 AM    CREATININE 0.51 07/31/2024 04:00 AM      Lab Results   Component Value Date/Time    ASTSGOT 45 07/31/2024 04:00 AM    ALTSGPT 49 07/31/2024 04:00 AM     Lab Results   Component Value Date/Time    CHOLSTRLTOT 179 10/07/2020 05:50 AM    LDL 81 10/07/2020 05:50 AM    HDL 81 10/07/2020 05:50 AM    TRIGLYCERIDE 97 07/29/2024 02:30 AM    TROPONINT 16 07/30/2024 03:22 PM       Recent Labs     07/29/24  0230   NTPROBNP 1816*           Assessment/Plan  1.  Fecal impaction with concern for ileus/proctocolitis status post manual disimpaction  2.  Paroxysmal atrial fibrillation with rapid ventricular rate  3.  Concern for tachybradycardia syndrome  4.  Prior TIA  5.  Alzheimer's disease  6.  Sinus bradycardia.    Clinically, she is doing well and not requiring any further pressor support and overnight telemetry monitor demonstrates sinus rhythm and sinus bradycardia.  No evidence of paroxysmal atrial fibrillation.  Is difficult to determine if her significant sinus pause was related to Overt underlying conduction disease versus severe fecal impaction with concern for an ileus/proctocolitis.  She continues to recover at this time continue to monitor.  Home rate control medications as well as ongoing flecainide  therapy will be held at this time.  She will continue with ongoing anticoagulation for stroke risk reduction.    Regarding her dyslipidemia would not be unreasonable to consider Repatha therapy during hospital stay.    In terms of her other medical conditions I will defer to the primary medicine service.      Thank you for allowing me to participate in the care of this patient.  I will continue to follow this patient    Please contact me with any questions.    Sreedhar Flynn D.O.   Cardiologist, [facility]  [phone]

## 2024-08-01 ENCOUNTER — APPOINTMENT (OUTPATIENT)
Dept: RADIOLOGY | Facility: MEDICAL CENTER | Age: 84
DRG: 308 | End: 2024-08-01
Payer: MEDICARE

## 2024-08-01 LAB
ALBUMIN SERPL BCP-MCNC: 3.2 G/DL (ref 3.2–4.9)
ALBUMIN/GLOB SERPL: 1.2 G/DL
ALP SERPL-CCNC: 163 U/L (ref 30–99)
ALT SERPL-CCNC: 71 U/L (ref 2–50)
ANION GAP SERPL CALC-SCNC: 12 MMOL/L (ref 7–16)
AST SERPL-CCNC: 80 U/L (ref 12–45)
BASOPHILS # BLD AUTO: 0.1 % (ref 0–1.8)
BASOPHILS # BLD: 0.01 K/UL (ref 0–0.12)
BILIRUB SERPL-MCNC: 0.7 MG/DL (ref 0.1–1.5)
BUN SERPL-MCNC: 9 MG/DL (ref 8–22)
CALCIUM ALBUM COR SERPL-MCNC: 9.4 MG/DL (ref 8.5–10.5)
CALCIUM SERPL-MCNC: 8.8 MG/DL (ref 8.5–10.5)
CHLORIDE SERPL-SCNC: 100 MMOL/L (ref 96–112)
CO2 SERPL-SCNC: 23 MMOL/L (ref 20–33)
CREAT SERPL-MCNC: 0.58 MG/DL (ref 0.5–1.4)
EOSINOPHIL # BLD AUTO: 0.04 K/UL (ref 0–0.51)
EOSINOPHIL NFR BLD: 0.6 % (ref 0–6.9)
ERYTHROCYTE [DISTWIDTH] IN BLOOD BY AUTOMATED COUNT: 43.8 FL (ref 35.9–50)
GFR SERPLBLD CREATININE-BSD FMLA CKD-EPI: 89 ML/MIN/1.73 M 2
GLOBULIN SER CALC-MCNC: 2.6 G/DL (ref 1.9–3.5)
GLUCOSE SERPL-MCNC: 125 MG/DL (ref 65–99)
HCT VFR BLD AUTO: 38.7 % (ref 37–47)
HGB BLD-MCNC: 13.3 G/DL (ref 12–16)
IMM GRANULOCYTES # BLD AUTO: 0.01 K/UL (ref 0–0.11)
IMM GRANULOCYTES NFR BLD AUTO: 0.1 % (ref 0–0.9)
LYMPHOCYTES # BLD AUTO: 0.73 K/UL (ref 1–4.8)
LYMPHOCYTES NFR BLD: 10.3 % (ref 22–41)
MAGNESIUM SERPL-MCNC: 1.9 MG/DL (ref 1.5–2.5)
MCH RBC QN AUTO: 31.4 PG (ref 27–33)
MCHC RBC AUTO-ENTMCNC: 34.4 G/DL (ref 32.2–35.5)
MCV RBC AUTO: 91.3 FL (ref 81.4–97.8)
MONOCYTES # BLD AUTO: 0.34 K/UL (ref 0–0.85)
MONOCYTES NFR BLD AUTO: 4.8 % (ref 0–13.4)
NEUTROPHILS # BLD AUTO: 5.96 K/UL (ref 1.82–7.42)
NEUTROPHILS NFR BLD: 84.1 % (ref 44–72)
NRBC # BLD AUTO: 0 K/UL
NRBC BLD-RTO: 0 /100 WBC (ref 0–0.2)
PHOSPHATE SERPL-MCNC: 2.8 MG/DL (ref 2.5–4.5)
PLATELET # BLD AUTO: 277 K/UL (ref 164–446)
PMV BLD AUTO: 9.5 FL (ref 9–12.9)
POTASSIUM SERPL-SCNC: 4 MMOL/L (ref 3.6–5.5)
PROT SERPL-MCNC: 5.8 G/DL (ref 6–8.2)
RBC # BLD AUTO: 4.24 M/UL (ref 4.2–5.4)
SODIUM SERPL-SCNC: 135 MMOL/L (ref 135–145)
WBC # BLD AUTO: 7.1 K/UL (ref 4.8–10.8)

## 2024-08-01 PROCEDURE — 770020 HCHG ROOM/CARE - TELE (206)

## 2024-08-01 PROCEDURE — 99232 SBSQ HOSP IP/OBS MODERATE 35: CPT | Performed by: INTERNAL MEDICINE

## 2024-08-01 PROCEDURE — 99233 SBSQ HOSP IP/OBS HIGH 50: CPT | Performed by: INTERNAL MEDICINE

## 2024-08-01 PROCEDURE — 85025 COMPLETE CBC W/AUTO DIFF WBC: CPT

## 2024-08-01 PROCEDURE — 80053 COMPREHEN METABOLIC PANEL: CPT

## 2024-08-01 PROCEDURE — 700102 HCHG RX REV CODE 250 W/ 637 OVERRIDE(OP): Performed by: INTERNAL MEDICINE

## 2024-08-01 PROCEDURE — 74018 RADEX ABDOMEN 1 VIEW: CPT

## 2024-08-01 PROCEDURE — 700102 HCHG RX REV CODE 250 W/ 637 OVERRIDE(OP)

## 2024-08-01 PROCEDURE — 83735 ASSAY OF MAGNESIUM: CPT

## 2024-08-01 PROCEDURE — A9270 NON-COVERED ITEM OR SERVICE: HCPCS | Performed by: INTERNAL MEDICINE

## 2024-08-01 PROCEDURE — A9270 NON-COVERED ITEM OR SERVICE: HCPCS

## 2024-08-01 PROCEDURE — 84100 ASSAY OF PHOSPHORUS: CPT

## 2024-08-01 RX ORDER — TRIAMCINOLONE ACETONIDE 1 MG/G
OINTMENT TOPICAL 2 TIMES DAILY
Status: DISCONTINUED | OUTPATIENT
Start: 2024-08-01 | End: 2024-08-04 | Stop reason: HOSPADM

## 2024-08-01 RX ORDER — LACTULOSE 20 G/30ML
30 SOLUTION ORAL 2 TIMES DAILY
Status: DISCONTINUED | OUTPATIENT
Start: 2024-08-01 | End: 2024-08-01

## 2024-08-01 RX ADMIN — Medication 5 MG: at 20:30

## 2024-08-01 RX ADMIN — POLYETHYLENE GLYCOL 3350 1 PACKET: 17 POWDER, FOR SOLUTION ORAL at 13:08

## 2024-08-01 RX ADMIN — APIXABAN 2.5 MG: 2.5 TABLET, FILM COATED ORAL at 18:37

## 2024-08-01 RX ADMIN — POLYETHYLENE GLYCOL 3350 1 PACKET: 17 POWDER, FOR SOLUTION ORAL at 18:37

## 2024-08-01 RX ADMIN — TRIAMCINOLONE ACETONIDE: 1 OINTMENT TOPICAL at 20:27

## 2024-08-01 RX ADMIN — POLYETHYLENE GLYCOL 3350 1 PACKET: 17 POWDER, FOR SOLUTION ORAL at 05:06

## 2024-08-01 RX ADMIN — ACETAMINOPHEN 650 MG: 325 TABLET ORAL at 05:06

## 2024-08-01 RX ADMIN — APIXABAN 2.5 MG: 2.5 TABLET, FILM COATED ORAL at 05:06

## 2024-08-01 ASSESSMENT — ENCOUNTER SYMPTOMS
SPEECH CHANGE: 0
PALPITATIONS: 0
HALLUCINATIONS: 0
MYALGIAS: 0
TREMORS: 0
FOCAL WEAKNESS: 0
SPUTUM PRODUCTION: 0
EYE PAIN: 0
TINGLING: 0
DIZZINESS: 0
ORTHOPNEA: 0
NAUSEA: 0
BACK PAIN: 0
CHILLS: 0
BLURRED VISION: 0
DOUBLE VISION: 0
SENSORY CHANGE: 0
NECK PAIN: 0
PHOTOPHOBIA: 0
ABDOMINAL PAIN: 0
HEADACHES: 0
WEIGHT LOSS: 0
FEVER: 0
VOMITING: 0
WEAKNESS: 1
DIARRHEA: 0
CONSTIPATION: 0
SHORTNESS OF BREATH: 0
COUGH: 0

## 2024-08-01 ASSESSMENT — PAIN DESCRIPTION - PAIN TYPE
TYPE: ACUTE PAIN

## 2024-08-01 ASSESSMENT — FIBROSIS 4 INDEX: FIB4 SCORE: 2.88

## 2024-08-01 ASSESSMENT — LIFESTYLE VARIABLES: SUBSTANCE_ABUSE: 0

## 2024-08-01 NOTE — PROGRESS NOTES
Cardiology Follow Up Progress Note    Date of Service  8/1/2024    Attending Physician  Rob Laurent M.D.    Chief Complaint   Rectal/fecal impaction with subsequent pause and noted paroxysmal atrial fibrillation    HPI  Gloria Herndon is a 83 y.o. female with a past medical history of paroxysmal atrial fibrillation with prior ablation therapy back in December 2017, dyslipidemia, TIA, dementia who presented 7/30/2024 with significant constipation     Interim Events  No acute overnight events.  No acute events per nursing staff.  Telemetry monitor demonstrated predominant sinus rhythm     Review of Systems  Review of Systems    Vital signs in last 24 hours  Temp:  [36.3 °C (97.4 °F)-37.2 °C (99 °F)] 36.3 °C (97.4 °F)  Pulse:  [60-81] 72  Resp:  [15-26] 15  BP: ()/(48-88) 134/62  SpO2:  [92 %-95 %] 93 %    Physical Exam  Physical Exam  Vitals and nursing note reviewed.   Constitutional:       Appearance: Normal appearance.   HENT:      Head: Normocephalic and atraumatic.      Nose: Nose normal.      Mouth/Throat:      Mouth: Mucous membranes are moist.      Pharynx: Oropharynx is clear.   Eyes:      Pupils: Pupils are equal, round, and reactive to light.   Cardiovascular:      Rate and Rhythm: Normal rate and regular rhythm.      Heart sounds: No murmur heard.     No friction rub. No gallop.   Pulmonary:      Effort: Pulmonary effort is normal.      Breath sounds: Normal breath sounds.   Abdominal:      Palpations: Abdomen is soft.      Tenderness: There is abdominal tenderness.   Musculoskeletal:         General: Normal range of motion.      Cervical back: Normal range of motion and neck supple.   Skin:     General: Skin is warm and dry.   Neurological:      General: No focal deficit present.      Mental Status: She is alert and oriented to person, place, and time. Mental status is at baseline.   Psychiatric:         Mood and Affect: Mood normal.         Behavior: Behavior normal.         Thought  "Content: Thought content normal.         Judgment: Judgment normal.         Lab Review  Lab Results   Component Value Date/Time    WBC 7.1 08/01/2024 03:40 AM    RBC 4.24 08/01/2024 03:40 AM    HEMOGLOBIN 13.3 08/01/2024 03:40 AM    HEMATOCRIT 38.7 08/01/2024 03:40 AM    MCV 91.3 08/01/2024 03:40 AM    MCH 31.4 08/01/2024 03:40 AM    MCHC 34.4 08/01/2024 03:40 AM    MPV 9.5 08/01/2024 03:40 AM      Lab Results   Component Value Date/Time    SODIUM 135 08/01/2024 03:40 AM    POTASSIUM 4.0 08/01/2024 03:40 AM    CHLORIDE 100 08/01/2024 03:40 AM    CO2 23 08/01/2024 03:40 AM    GLUCOSE 125 (H) 08/01/2024 03:40 AM    BUN 9 08/01/2024 03:40 AM    CREATININE 0.58 08/01/2024 03:40 AM      Lab Results   Component Value Date/Time    ASTSGOT 80 (H) 08/01/2024 03:40 AM    ALTSGPT 71 (H) 08/01/2024 03:40 AM     Lab Results   Component Value Date/Time    CHOLSTRLTOT 179 10/07/2020 05:50 AM    LDL 81 10/07/2020 05:50 AM    HDL 81 10/07/2020 05:50 AM    TRIGLYCERIDE 97 07/29/2024 02:30 AM    TROPONINT 16 07/30/2024 03:22 PM       No results for input(s): \"NTPROBNP\" in the last 72 hours.          Assessment/Plan  1.  Fecal impaction with concern for ileus/proctocolitis status post manual disimpaction  2.  Paroxysmal atrial fibrillation with rapid ventricular rate  3.  Concern for tachybradycardia syndrome  4.  Prior TIA  5.  Alzheimer's disease  6.  Sinus bradycardia.    Clinically, she is doing excellent overnight and not having any further episodes of paroxysmal atrial fibrillation or bradycardia.  At this time there is concerned about possible still retained stool within the rectum that is being followed closely by the primary medicine service as well as GI service.  Regarding her atrial fibrillation we discussed ongoing medication management and agreed on a pill in the pocket strategy with her flecainide therapy in which she currently takes flecainide at 100 mg twice daily which would recommend transition over to as needed " strategy.  She will continue Eliquis therapy and has been prescribed metoprolol therapy however at this time this has been held.  Not unreasonable to consider utilization of flecainide therapy individually however would be recommended with a josr blocking agent would not be unreasonable and decrease her metoprolol down to 25 mg daily in addition to her current flecainide dosing  In terms of her other medical conditions I will defer to the primary medicine service.      Thank you for allowing me to participate in the care of this patient.      At this time the cardiology service will sign off.  Patient will follow-up in the cardiology department as scheduled.  If you have any further questions please free to contact us we will be happy to reengage in the patient's care    Please contact me with any questions.    Sreedhar Flynn D.O.   Cardiologist, [facility]  [phone]

## 2024-08-01 NOTE — ASSESSMENT & PLAN NOTE
Underwent fecal disimpaction under anesthesia  Continue aggressive bowel medications.  Continue to monitor

## 2024-08-01 NOTE — PROGRESS NOTES
4 Eyes Skin Assessment Completed by XAVIER Sanchez and XAVIER Low.    Head WDL  Ears R ear slow to conchis  Nose WDL  Mouth WDL  Neck WDL  Breast/Chest Red and blanching  Shoulder Blades red and blanching  Spine Redness and Blanching  (R) Arm/Elbow/Hand Redness, Blanching, and Bruising  (L) Arm/Elbow/Hand Redness, Blanching, and Bruising  Abdomen Redness and Rash  Groin Redness  Scrotum/Coccyx/Buttocks Redness and Blanching, slow to conchis  (R) Leg Redness and Blanching, bruising  (L) Leg Redness and Blanching, bruising  (R) Heel/Foot/Toe Redness, Blanching, and Boggy, bruising  (L) Heel/Foot/Toe Redness, Blanching, and Boggy          Devices In Places ECG, Tele Box, Blood Pressure Cuff, Pulse Ox, SCD's, and Nasal Cannula      Interventions In Place NC W/Ear Foams, Sacral Mepilex, TAP System, Pillows, Q2 Turns, Low Air Loss Mattress, Barrier Cream, Heels Loaded W/Pillows, and Pressure Redistribution Mattress    Possible Skin Injury Yes    Pictures Uploaded Into Epic Yes  Wound Consult Placed Yes  RN Wound Prevention Protocol Ordered Yes

## 2024-08-01 NOTE — ASSESSMENT & PLAN NOTE
Likely due to flash pulmonary edema    Intubated date: 7/29 @ [facility]  Reason intubated: Acute hypoxic respiratory failure  Extubated later the same night    RT protocols  Incentive spirometry  Continue to monitor closely.

## 2024-08-01 NOTE — PROGRESS NOTES
Hospital Medicine Daily Progress Note    Date of Service  7/31/2024    Chief Complaint  Gloria Herndon is a 83 y.o. female admitted 7/30/2024 with bradycardia    Hospital Course  83-year-old old female with past medical history of atrial fibrillation on Eliquis who presented to [facility] with abdominal pain and rectal pain.  Patient found to have severe constipation.  She developed respiratory decompensation due to noncardiogenic pulmonary edema from standing and transiently was intubated.  Patient became bradycardic and even off of her A-fib medication and she required atropine and Levophed.  Cardiology was evaluated and recommended to transfer to [facility] for possible pacemaker placement.    On July 31, 2024 intensivist Dr. Laurent requested to transfer care to hospital service.    Interval Problem Update    I evaluated and examined her at the bedside  She reported pain in her left lower extremity pain  Her heart rate at the time of evaluation was 63.  I reviewed cardiology note.  I discussed plan of care with patient and her  at the bedside and answered all of their questions.    I have discussed this patient's plan of care and discharge plan at IDT rounds today with Case Management, Nursing, Nursing leadership, and other members of the IDT team.    Consultants/Specialty  cardiology and critical care    Code Status  Full Code    Disposition  The patient is not medically cleared for discharge to home or a post-acute facility.      I have placed the appropriate orders for post-discharge needs.    Review of Systems  Review of Systems   Constitutional:  Positive for malaise/fatigue. Negative for chills, fever and weight loss.   HENT:  Negative for hearing loss and tinnitus.    Eyes:  Negative for blurred vision, double vision, photophobia and pain.   Respiratory:  Negative for cough, sputum production and shortness of breath.    Cardiovascular:  Negative for chest pain,  palpitations, orthopnea and leg swelling.   Gastrointestinal:  Negative for abdominal pain, constipation, diarrhea, nausea and vomiting.   Genitourinary:  Negative for dysuria, frequency and urgency.   Musculoskeletal:  Positive for myalgias. Negative for back pain, joint pain and neck pain.   Skin:  Negative for rash.   Neurological:  Positive for weakness. Negative for dizziness, tingling, tremors, sensory change, speech change, focal weakness and headaches.   Psychiatric/Behavioral:  Negative for hallucinations and substance abuse.    All other systems reviewed and are negative.       Physical Exam  Temp:  [37 °C (98.6 °F)-37.9 °C (100.2 °F)] 37.2 °C (99 °F)  Pulse:  [45-72] 60  Resp:  [12-34] 17  BP: ()/(35-63) 115/58  SpO2:  [91 %-98 %] 94 %    Physical Exam  Vitals reviewed.   Constitutional:       General: She is not in acute distress.     Appearance: Normal appearance. She is ill-appearing.   HENT:      Head: Normocephalic and atraumatic.      Nose: No congestion.   Eyes:      General:         Right eye: No discharge.         Left eye: No discharge.      Pupils: Pupils are equal, round, and reactive to light.   Cardiovascular:      Rate and Rhythm: Normal rate and regular rhythm.      Pulses: Normal pulses.      Heart sounds: Normal heart sounds. No murmur heard.  Pulmonary:      Effort: Pulmonary effort is normal. No respiratory distress.      Breath sounds: Normal breath sounds. No stridor.   Abdominal:      General: Bowel sounds are normal. There is no distension.      Palpations: Abdomen is soft.      Tenderness: There is no abdominal tenderness.   Musculoskeletal:         General: No swelling or tenderness. Normal range of motion.      Cervical back: Normal range of motion. No rigidity.   Skin:     General: Skin is warm.      Capillary Refill: Capillary refill takes less than 2 seconds.      Coloration: Skin is not jaundiced or pale.      Findings: No bruising.   Neurological:      General: No  focal deficit present.      Mental Status: She is alert and oriented to person, place, and time.      Cranial Nerves: No cranial nerve deficit.      Comments: Decreased range of motion lower extremities more on the left side   Psychiatric:         Mood and Affect: Mood normal.         Behavior: Behavior normal.         Fluids    Intake/Output Summary (Last 24 hours) at 7/31/2024 1844  Last data filed at 7/31/2024 1200  Gross per 24 hour   Intake 390.84 ml   Output 420 ml   Net -29.16 ml       Laboratory  Recent Labs     07/30/24  0805 07/30/24  1548 07/31/24  0400   WBC 7.0 6.9 6.0   RBC 3.60* 3.53* 3.56*   HEMOGLOBIN 11.4* 11.1* 11.0*   HEMATOCRIT 32.1* 32.3* 32.3*   MCV 89.2 91.5 90.7   MCH 31.7 31.4 30.9   MCHC 35.5 34.4 34.1   RDW 42.8 44.4 44.0   PLATELETCT 227 230 220   MPV 9.8 10.1 9.7     Recent Labs     07/30/24  0805 07/30/24  1522 07/31/24  0400   SODIUM 137 137 136   POTASSIUM 3.2* 3.6 3.2*   CHLORIDE 103 103 103   CO2 26 21 23   GLUCOSE 133* 129* 120*   BUN 10 9 10   CREATININE 0.66 0.60 0.51   CALCIUM 8.6 8.6 8.0*     Recent Labs     07/30/24  1548   INR 1.10         Recent Labs     07/29/24  0230   TRIGLYCERIDE 97       Imaging  DX-CHEST-PORTABLE (1 VIEW)   Final Result         1.  Pulmonary edema and/or infiltrates are identified, which are stable since the prior exam.   2.  Trace bilateral pleural effusions   3.  Cardiomegaly   4.  Atherosclerosis           Assessment/Plan  * Tachy-napoleon syndrome (HCC)  Assessment & Plan  Evaluated by cardiology, they are continuing to follow  No plans for temporary or permanent pacemaker at this time  Hold cardiac medications (metoprolol, flecainide)  Continue to monitor closely on telemetry.    Longstanding persistent atrial fibrillation (HCC)  Assessment & Plan  I am continuing Eliquis  Hold flecainide and metoprolol  Telemetry  Reviewed cardiology note    Stercoral ulcer of rectum  Assessment & Plan  Underwent fecal disimpaction under anesthesia  Continue  aggressive bowel medications.  Continue to monitor    Acute respiratory failure with hypoxia (HCC)  Assessment & Plan  Likely due to flash pulmonary edema    Intubated date: 7/29 @ [facility]  Reason intubated: Acute hypoxic respiratory failure  Extubated later the same night    RT protocols  Incentive spirometry  Continue to monitor closely.    Hypokalemia  Assessment & Plan  Continue to monitor and replace as needed.    Cardiogenic shock (HCC)  Assessment & Plan  Heart rate has been improving.  She is now off norepinephrine  Continue to monitor closely.        I discussed plan of care with intensivist Dr. Laurent    I discussed plan of care with bedside RN and ICU.    I reviewed cardiology note.      VTE prophylaxis:    therapeutic anticoagulation with eliquis 5 mg BID      I have performed a physical exam and reviewed and updated ROS and Plan today (7/31/2024). In review of yesterday's note (7/30/2024), there are no changes except as documented above.

## 2024-08-01 NOTE — CARE PLAN
The patient is Watcher - Medium risk of patient condition declining or worsening    Shift Goals  Clinical Goals: Hemodynamics, Heart rate  Patient Goals: Comfort  Family Goals: Updates    Progress made toward(s) clinical / shift goals:    Problem: Knowledge Deficit - Standard  Goal: Patient and family/care givers will demonstrate understanding of plan of care, disease process/condition, diagnostic tests and medications  Outcome: Progressing     Problem: Pain - Standard  Goal: Alleviation of pain or a reduction in pain to the patient’s comfort goal  Outcome: Progressing     Problem: Hemodynamics  Goal: Patient's hemodynamics, fluid balance and neurologic status will be stable or improve  Outcome: Progressing     Problem: Fall Risk  Goal: Patient will remain free from falls  Outcome: Progressing     Problem: Skin Integrity  Goal: Skin integrity is maintained or improved  Outcome: Progressing     Problem: Bowel Elimination  Goal: Establish and maintain regular bowel function  Outcome: Progressing       Patient is not progressing towards the following goals:

## 2024-08-01 NOTE — PROGRESS NOTES
BSSR received from XAVIER Allen.  Patient asleep at this time.       Media Information  File Link    Monitoring Strips GE - Scan on 7/31/2024 12:00 AM        Key Information    Document ID File Type Document Type Description   [id number] Image Monitoring Strips GE      Import Information    Attached At Date Time User Dept   Encounter Level 7/31/2024  7:43 PM Physician Outpatient      Encounter    Hospital Encounter on 7/30/24     Page 2 of 5    Document Information    Cardiology Imaging: Monitoring Strips GE      07/31/2024 00:00   Attached To:   Hospital Encounter on 7/30/24     Source Information    Physician Outpatient

## 2024-08-01 NOTE — ASSESSMENT & PLAN NOTE
Evaluated by cardiology, they are continuing to follow  No plans for temporary or permanent pacemaker at this time  Hold cardiac medications (metoprolol, flecainide)  Continue to monitor closely on telemetry.

## 2024-08-01 NOTE — PROGRESS NOTES
Hospital Medicine Daily Progress Note    Date of Service  8/1/2024    Chief Complaint  Gloria Herndon is a 83 y.o. female admitted 7/30/2024 with bradycardia    Hospital Course  83-year-old old female with past medical history of atrial fibrillation on Eliquis who presented to [facility] with abdominal pain and rectal pain.  Patient found to have severe constipation.  She developed respiratory decompensation due to noncardiogenic pulmonary edema from standing and transiently was intubated.  Patient became bradycardic and even off of her A-fib medication and she required atropine and Levophed.  Cardiology was evaluated and recommended to transfer to [facility] for possible pacemaker placement.    On July 31, 2024 intensivist Dr. Laurent requested to transfer care to hospital service.    Interval Problem Update    07/31/2024      I evaluated and examined her at the bedside  She reported pain in her left lower extremity pain  Her heart rate at the time of evaluation was 63.  I reviewed cardiology note.  I discussed plan of care with patient and her  at the bedside and answered all of their questions.    08/02/24    I evaluated and examined her at the bedside.  She reported that overall she is feeling better and denies complaint of any pain.  I had a lengthy discussion with patient and her  at the bedside and I admitted them regarding plan of care that her heart rate now is within normal rate and sinus rhythm.  I reviewed cardiology note.  I reviewed findings of x-ray abdomen and discuss it with patient and her .  She has been having multiple small bowel movements.  I ordered lactulose but later I discontinued it as she has multiple bowel movements.  Plan is to obtain x-ray abdomen tomorrow morning.  I discussed plan of care with him and answered all of their questions.    I have discussed this patient's plan of care and discharge plan at IDT rounds today with  Case Management, Nursing, Nursing leadership, and other members of the IDT team.    Consultants/Specialty  cardiology and critical care    Code Status  Full Code    Disposition  The patient is not medically cleared for discharge to home or a post-acute facility.      I have placed the appropriate orders for post-discharge needs.    Review of Systems  Review of Systems   Constitutional:  Positive for malaise/fatigue. Negative for chills, fever and weight loss.   HENT:  Negative for hearing loss and tinnitus.    Eyes:  Negative for blurred vision, double vision, photophobia and pain.   Respiratory:  Negative for cough, sputum production and shortness of breath.    Cardiovascular:  Negative for chest pain, palpitations, orthopnea and leg swelling.   Gastrointestinal:  Negative for abdominal pain, constipation, diarrhea, nausea and vomiting.   Genitourinary:  Negative for dysuria, frequency and urgency.   Musculoskeletal:  Negative for back pain, joint pain, myalgias and neck pain.   Skin:  Negative for rash.   Neurological:  Positive for weakness. Negative for dizziness, tingling, tremors, sensory change, speech change, focal weakness and headaches.   Psychiatric/Behavioral:  Negative for hallucinations and substance abuse.    All other systems reviewed and are negative.       Physical Exam  Temp:  [36.3 °C (97.4 °F)-37.2 °C (99 °F)] 36.3 °C (97.4 °F)  Pulse:  [60-81] 72  Resp:  [15-26] 15  BP: ()/(48-88) 134/62  SpO2:  [92 %-95 %] 93 %    Physical Exam  Vitals reviewed.   Constitutional:       General: She is not in acute distress.     Appearance: Normal appearance. She is ill-appearing.      Comments: Clinically she looks significantly improved.   HENT:      Head: Normocephalic and atraumatic.      Nose: No congestion.   Eyes:      General:         Right eye: No discharge.         Left eye: No discharge.      Pupils: Pupils are equal, round, and reactive to light.   Cardiovascular:      Rate and Rhythm: Normal  rate and regular rhythm.      Pulses: Normal pulses.      Heart sounds: Normal heart sounds. No murmur heard.  Pulmonary:      Effort: Pulmonary effort is normal. No respiratory distress.      Breath sounds: Normal breath sounds. No stridor.   Abdominal:      General: Bowel sounds are normal. There is no distension.      Palpations: Abdomen is soft.      Tenderness: There is no abdominal tenderness.   Musculoskeletal:         General: No swelling or tenderness. Normal range of motion.      Cervical back: Normal range of motion. No rigidity.   Skin:     General: Skin is warm.      Capillary Refill: Capillary refill takes less than 2 seconds.      Coloration: Skin is not jaundiced or pale.      Findings: No bruising.   Neurological:      General: No focal deficit present.      Mental Status: She is alert and oriented to person, place, and time.      Cranial Nerves: No cranial nerve deficit.      Comments: Decreased range of motion lower extremities more on the left side   Psychiatric:         Mood and Affect: Mood normal.         Behavior: Behavior normal.         Fluids    Intake/Output Summary (Last 24 hours) at 8/1/2024 1538  Last data filed at 8/1/2024 0600  Gross per 24 hour   Intake 220 ml   Output 200 ml   Net 20 ml       Laboratory  Recent Labs     07/30/24  1548 07/31/24  0400 08/01/24  0340   WBC 6.9 6.0 7.1   RBC 3.53* 3.56* 4.24   HEMOGLOBIN 11.1* 11.0* 13.3   HEMATOCRIT 32.3* 32.3* 38.7   MCV 91.5 90.7 91.3   MCH 31.4 30.9 31.4   MCHC 34.4 34.1 34.4   RDW 44.4 44.0 43.8   PLATELETCT 230 220 277   MPV 10.1 9.7 9.5     Recent Labs     07/30/24  1522 07/31/24  0400 08/01/24  0340   SODIUM 137 136 135   POTASSIUM 3.6 3.2* 4.0   CHLORIDE 103 103 100   CO2 21 23 23   GLUCOSE 129* 120* 125*   BUN 9 10 9   CREATININE 0.60 0.51 0.58   CALCIUM 8.6 8.0* 8.8     Recent Labs     07/30/24  1548   INR 1.10                 Imaging  [id number] VIEW   Final Result         1.  Air-filled distended loops of bowel are seen  with reactive mucosal pattern in the upper abdomen, appearance suggests ileus or enteritis. Recommend radiographic followup to resolution to exclude progression to obstruction.   2.  Large quantity of stool in the rectum, appearance suggesting rectal fecal impaction.   3.  Left lower lobe infiltrates   4.  Trace left pleural effusion      DX-CHEST-PORTABLE (1 VIEW)   Final Result         1.  Pulmonary edema and/or infiltrates are identified, which are stable since the prior exam.   2.  Trace bilateral pleural effusions   3.  Cardiomegaly   4.  Atherosclerosis           Assessment/Plan  * Tachy-napoleon syndrome (HCC)  Assessment & Plan  Evaluated by cardiology, they are continuing to follow  No plans for temporary or permanent pacemaker at this time  Hold cardiac medications (metoprolol, flecainide)  I reviewed monitor summary  I discussed plan of care with patient and her  at the bedside  I reviewed cardiology note.    Longstanding persistent atrial fibrillation (HCC)  Assessment & Plan  I am continuing Eliquis.  No signs of bleeding  Hold flecainide and metoprolol  Continue monitor closely in telemetry for  Plan is to transfer out from [facility] to telemetry.      Stercoral ulcer of rectum  Assessment & Plan  Underwent fecal disimpaction under anesthesia  Continue aggressive bowel medications.  I reviewed x-ray abdomen from this morning that it showed air-filled distended loops of bowel are seen and large fluid of stool in the rectum.  Patient had multiple small bowel movement which is liquidy.  I ordered repeat x-ray abdomen for tomorrow.  Continue to monitor    Acute respiratory failure with hypoxia (HCC)  Assessment & Plan  Likely due to flash pulmonary edema    Intubated date: 7/29 @ [facility]  Reason intubated: Acute hypoxic respiratory failure  Extubated later the same night    RT protocols  Incentive spirometry  Currently she is requiring 1 L of oxygen to maintain oxygen saturation.  Plan is to transfer her to  telemetry.    Hypokalemia  Assessment & Plan  Continue to monitor and replace as needed.    Cardiogenic shock (HCC)  Assessment & Plan  Heart rate now significantly improved.  She is now off norepinephrine  Continue to monitor closely.  No plans to transfer out from [facility].  Resolved        I discussed plan of care during multidisciplinary rounds regarding patient's current medical condition and plan of care.    I reviewed cardiology note.    >51 minutes total time spent in records review, lab/radiology assessment, patient history and assessment, and directing plan of care with high level medical decision making complexity. See orders.     VTE prophylaxis:    therapeutic anticoagulation with eliquis 2.5 mg BID      I have performed a physical exam and reviewed and updated ROS and Plan today (8/1/2024). In review of yesterday's note (7/31/2024), there are no changes except as documented above.

## 2024-08-01 NOTE — PROGRESS NOTES
4 Eyes Skin Assessment Completed by XAVIER Sena and XAVIER Cloud.    Head WDL  Ears Redness and Blanching  Nose WDL  Mouth WDL  Neck WDL  Breast/Chest Scab  Shoulder Blades Redness and Blanching  Spine Redness and Blanching  (R) Arm/Elbow/Hand Redness, Blanching, and Bruising  (L) Arm/Elbow/Hand Redness, Blanching, and Bruising  Abdomen WDL  Groin WDL  Scrotum/Coccyx/Buttocks Redness, Blanching, and Excoriation  (R) Leg Bruising  (L) Leg Bruising  (R) Heel/Foot/Toe Redness and Blanching  (L) Heel/Foot/Toe Redness and Blanching          Devices In Places ECG, Blood Pressure Cuff, Pulse Ox, SCD's, and Nasal Cannula      Interventions In Place NC W/Ear Foams, Heel Mepilex, Sacral Mepilex, TAP System, Pillows, Low Air Loss Mattress, and Heels Loaded W/Pillows    Possible Skin Injury No    Pictures Uploaded Into Epic Yes  Wound Consult Placed N/A  RN Wound Prevention Protocol Ordered No

## 2024-08-02 ENCOUNTER — APPOINTMENT (OUTPATIENT)
Dept: RADIOLOGY | Facility: MEDICAL CENTER | Age: 84
DRG: 308 | End: 2024-08-02
Attending: STUDENT IN AN ORGANIZED HEALTH CARE EDUCATION/TRAINING PROGRAM
Payer: MEDICARE

## 2024-08-02 PROBLEM — K59.09 OTHER CONSTIPATION: Status: ACTIVE | Noted: 2024-07-31

## 2024-08-02 LAB
ALBUMIN SERPL BCP-MCNC: 2.6 G/DL (ref 3.2–4.9)
ALBUMIN/GLOB SERPL: 0.9 G/DL
ALP SERPL-CCNC: 130 U/L (ref 30–99)
ALT SERPL-CCNC: 47 U/L (ref 2–50)
ANION GAP SERPL CALC-SCNC: 12 MMOL/L (ref 7–16)
AST SERPL-CCNC: 36 U/L (ref 12–45)
BASOPHILS # BLD AUTO: 0.4 % (ref 0–1.8)
BASOPHILS # BLD: 0.02 K/UL (ref 0–0.12)
BILIRUB SERPL-MCNC: 0.4 MG/DL (ref 0.1–1.5)
BUN SERPL-MCNC: 11 MG/DL (ref 8–22)
CALCIUM ALBUM COR SERPL-MCNC: 10 MG/DL (ref 8.5–10.5)
CALCIUM SERPL-MCNC: 8.9 MG/DL (ref 8.5–10.5)
CHLORIDE SERPL-SCNC: 100 MMOL/L (ref 96–112)
CO2 SERPL-SCNC: 22 MMOL/L (ref 20–33)
CREAT SERPL-MCNC: 0.5 MG/DL (ref 0.5–1.4)
EOSINOPHIL # BLD AUTO: 0.16 K/UL (ref 0–0.51)
EOSINOPHIL NFR BLD: 2.9 % (ref 0–6.9)
ERYTHROCYTE [DISTWIDTH] IN BLOOD BY AUTOMATED COUNT: 44.5 FL (ref 35.9–50)
GFR SERPLBLD CREATININE-BSD FMLA CKD-EPI: 92 ML/MIN/1.73 M 2
GLOBULIN SER CALC-MCNC: 2.8 G/DL (ref 1.9–3.5)
GLUCOSE SERPL-MCNC: 108 MG/DL (ref 65–99)
HCT VFR BLD AUTO: 34.6 % (ref 37–47)
HGB BLD-MCNC: 11.9 G/DL (ref 12–16)
IMM GRANULOCYTES # BLD AUTO: 0.01 K/UL (ref 0–0.11)
IMM GRANULOCYTES NFR BLD AUTO: 0.2 % (ref 0–0.9)
LYMPHOCYTES # BLD AUTO: 1.28 K/UL (ref 1–4.8)
LYMPHOCYTES NFR BLD: 22.9 % (ref 22–41)
MAGNESIUM SERPL-MCNC: 1.9 MG/DL (ref 1.5–2.5)
MCH RBC QN AUTO: 31.8 PG (ref 27–33)
MCHC RBC AUTO-ENTMCNC: 34.4 G/DL (ref 32.2–35.5)
MCV RBC AUTO: 92.5 FL (ref 81.4–97.8)
MONOCYTES # BLD AUTO: 0.41 K/UL (ref 0–0.85)
MONOCYTES NFR BLD AUTO: 7.3 % (ref 0–13.4)
NEUTROPHILS # BLD AUTO: 3.71 K/UL (ref 1.82–7.42)
NEUTROPHILS NFR BLD: 66.3 % (ref 44–72)
NRBC # BLD AUTO: 0 K/UL
NRBC BLD-RTO: 0 /100 WBC (ref 0–0.2)
PHOSPHATE SERPL-MCNC: 2.9 MG/DL (ref 2.5–4.5)
PLATELET # BLD AUTO: 314 K/UL (ref 164–446)
PMV BLD AUTO: 9.6 FL (ref 9–12.9)
POTASSIUM SERPL-SCNC: 3.6 MMOL/L (ref 3.6–5.5)
PROT SERPL-MCNC: 5.4 G/DL (ref 6–8.2)
RBC # BLD AUTO: 3.74 M/UL (ref 4.2–5.4)
SODIUM SERPL-SCNC: 134 MMOL/L (ref 135–145)
WBC # BLD AUTO: 5.6 K/UL (ref 4.8–10.8)

## 2024-08-02 PROCEDURE — 83735 ASSAY OF MAGNESIUM: CPT

## 2024-08-02 PROCEDURE — A9270 NON-COVERED ITEM OR SERVICE: HCPCS

## 2024-08-02 PROCEDURE — 74018 RADEX ABDOMEN 1 VIEW: CPT

## 2024-08-02 PROCEDURE — 99233 SBSQ HOSP IP/OBS HIGH 50: CPT | Performed by: STUDENT IN AN ORGANIZED HEALTH CARE EDUCATION/TRAINING PROGRAM

## 2024-08-02 PROCEDURE — 85025 COMPLETE CBC W/AUTO DIFF WBC: CPT

## 2024-08-02 PROCEDURE — 700102 HCHG RX REV CODE 250 W/ 637 OVERRIDE(OP): Performed by: STUDENT IN AN ORGANIZED HEALTH CARE EDUCATION/TRAINING PROGRAM

## 2024-08-02 PROCEDURE — 80053 COMPREHEN METABOLIC PANEL: CPT

## 2024-08-02 PROCEDURE — 770020 HCHG ROOM/CARE - TELE (206)

## 2024-08-02 PROCEDURE — A9270 NON-COVERED ITEM OR SERVICE: HCPCS | Performed by: STUDENT IN AN ORGANIZED HEALTH CARE EDUCATION/TRAINING PROGRAM

## 2024-08-02 PROCEDURE — 36415 COLL VENOUS BLD VENIPUNCTURE: CPT

## 2024-08-02 PROCEDURE — 700101 HCHG RX REV CODE 250: Performed by: INTERNAL MEDICINE

## 2024-08-02 PROCEDURE — 700102 HCHG RX REV CODE 250 W/ 637 OVERRIDE(OP)

## 2024-08-02 PROCEDURE — 84100 ASSAY OF PHOSPHORUS: CPT

## 2024-08-02 RX ORDER — BISACODYL 10 MG
10 SUPPOSITORY, RECTAL RECTAL DAILY
Status: DISCONTINUED | OUTPATIENT
Start: 2024-08-02 | End: 2024-08-04 | Stop reason: HOSPADM

## 2024-08-02 RX ORDER — METOPROLOL SUCCINATE 25 MG/1
25 TABLET, EXTENDED RELEASE ORAL DAILY
Status: DISCONTINUED | OUTPATIENT
Start: 2024-08-02 | End: 2024-08-04 | Stop reason: HOSPADM

## 2024-08-02 RX ORDER — METOPROLOL SUCCINATE 25 MG/1
25 TABLET, EXTENDED RELEASE ORAL DAILY
Status: DISCONTINUED | OUTPATIENT
Start: 2024-08-03 | End: 2024-08-02

## 2024-08-02 RX ORDER — PETROLATUM 42 G/100G
OINTMENT TOPICAL 3 TIMES DAILY
Status: DISCONTINUED | OUTPATIENT
Start: 2024-08-02 | End: 2024-08-04 | Stop reason: HOSPADM

## 2024-08-02 RX ADMIN — TRIAMCINOLONE ACETONIDE: 1 OINTMENT TOPICAL at 06:17

## 2024-08-02 RX ADMIN — MAGNESIUM HYDROXIDE 30 ML: 1200 LIQUID ORAL at 17:03

## 2024-08-02 RX ADMIN — METOPROLOL SUCCINATE 25 MG: 25 TABLET, EXTENDED RELEASE ORAL at 09:00

## 2024-08-02 RX ADMIN — BISACODYL 10 MG: 10 SUPPOSITORY RECTAL at 17:03

## 2024-08-02 RX ADMIN — POLYETHYLENE GLYCOL 3350 1 PACKET: 17 POWDER, FOR SOLUTION ORAL at 17:04

## 2024-08-02 RX ADMIN — Medication 5 MG: at 22:00

## 2024-08-02 RX ADMIN — Medication: at 17:04

## 2024-08-02 RX ADMIN — ACETAMINOPHEN 650 MG: 325 TABLET ORAL at 09:02

## 2024-08-02 RX ADMIN — APIXABAN 2.5 MG: 2.5 TABLET, FILM COATED ORAL at 04:47

## 2024-08-02 RX ADMIN — Medication: at 22:00

## 2024-08-02 RX ADMIN — ACETAMINOPHEN 650 MG: 325 TABLET ORAL at 00:47

## 2024-08-02 RX ADMIN — APIXABAN 2.5 MG: 2.5 TABLET, FILM COATED ORAL at 17:03

## 2024-08-02 RX ADMIN — TRIAMCINOLONE ACETONIDE: 1 OINTMENT TOPICAL at 17:04

## 2024-08-02 ASSESSMENT — ENCOUNTER SYMPTOMS
ABDOMINAL PAIN: 1
CONSTIPATION: 1
NAUSEA: 0
SHORTNESS OF BREATH: 0
FEVER: 0
VOMITING: 0

## 2024-08-02 ASSESSMENT — FIBROSIS 4 INDEX: FIB4 SCORE: 1.4

## 2024-08-02 ASSESSMENT — PAIN DESCRIPTION - PAIN TYPE: TYPE: ACUTE PAIN;CHRONIC PAIN

## 2024-08-02 NOTE — CARE PLAN
Problem: Knowledge Deficit - Standard  Goal: Patient and family/care givers will demonstrate understanding of plan of care, disease process/condition, diagnostic tests and medications  Outcome: Progressing     Problem: Bowel Elimination  Goal: Establish and maintain regular bowel function  Outcome: Progressing   The patient is Stable - Low risk of patient condition declining or worsening    Shift Goals  Clinical Goals: hemodynamic stability, VSS, skin breakdown prevention, fall prevention, bowel regimen management  Patient Goals: comfort  Family Goals: remain updated    Progress made toward(s) clinical / shift goals:  involve patient and family in plan of care, enema given for bowel management.    Patient is not progressing towards the following goals:n/a

## 2024-08-02 NOTE — PROGRESS NOTES
Monitor Summary  Rhythm: SR  Rate: 74-80  Ectopy: rare PVC  .18 / .08 / .36    No monitor strip available

## 2024-08-02 NOTE — PROGRESS NOTES
Bedside report received from off going RN/tech: Deloris, assumed care of patient.     Fall Risk Score: HIGH RISK  Fall risk interventions in place: Place yellow fall risk ID band on patient, Provide patient/family education based on risk assessment, Educate patient/family to call staff for assistance when getting out of bed, Place fall precaution signage outside patient door, Utilize bed/chair fall alarm, and Bed alarm connected correctly  Bed type: Low air loss (Isaac Score less than 17 interventions in place)  Patient on cardiac monitor: Yes  IVF/IV medications: Not Applicable   Oxygen: Room Air  Bedside sitter: Not Applicable   Isolation: Not applicable

## 2024-08-02 NOTE — HOSPITAL COURSE
Gloria Herndon is an 83-year-old female with PMHx atrial fibrillation on Eliquis, HLD, TIA.  Admitted 7/30 for abdominal pain and rectal pain.    Patient initially presented to [facility].  Patient was found to be severely constipated and became acutely hypoxic secondary to cardiogenic shock and flash pulmonary edema.  She required intubation 7/29 and was subsequently extubated later that night.  Patient developed significant bradycardia.  Cardiology was consulted and her home medications were adjusted.  No plans for permanent pacemaker at this time.  Bradycardia improved with discontinuation of flecainide and a lower dose of metoprolol.    Moving forward patient will take metoprolol 25 mg daily for her atrial fibrillation.  She will keep a flecainide pill on her person at all times for as needed palpitations.  She will follow closely with cardiology.  Patient will continue to take her Eliquis every day.    In terms of patient's constipation she was manually disimpacted under anesthesia.  Patient required aggressive bowel regimen after disimpaction.  At time of discharge she was having regular, soft bowel movements.  I have prescribed MiraLAX, Colace, suppositories as needed for constipation at home.    Additionally, due to complicated and prolonged hospitalization-Home health has been established.

## 2024-08-02 NOTE — PROGRESS NOTES
Bedside report received from off going RN/tech: Carly, assumed care of patient.     Fall Risk Score: HIGH RISK  Fall risk interventions in place: Place yellow fall risk ID band on patient, Provide patient/family education based on risk assessment, Educate patient/family to call staff for assistance when getting out of bed, Place fall precaution signage outside patient door, Place patient in room close to nursing station, Utilize bed/chair fall alarm, Notify charge of high risk for huddle, and Bed alarm connected correctly  Bed type: Low air loss (Isaac Score less than 17 interventions in place)  Patient on cardiac monitor: Yes  IVF/IV medications: Not Applicable   Oxygen: Room Air  Bedside sitter: Not Applicable   Isolation: Not applicable    Pt opens eyes spontaneously and tracks RN, is disoriented in conversation: disoriented to time, disoriented to event, self,, and location,, follows commands. A&Ox2    Bedside report received from day shift RN. Pt is currently A&Ox2, SR, breathing at a normal rate and rhythm, warm, dry, and skin color appropriate for ethnicity, and in no visible emotional or physical distress. Bed locked in lowest position, call light is within reach, fall prevention measures in place. Pt educated to use call light before getting out of bed, pt verbalized understanding. Pt is on a cardiac monitor, order verified, transmitter connected appropriately, and leads placed correctly, rhythm and rate assessed by RN, monitor staff updated of changes and parameters as necessary.  No further needs at this time.

## 2024-08-02 NOTE — PROGRESS NOTES
4 Eyes Skin Assessment Completed by XAVIER Flores and XAVIER Godfrey.    Head WDL  Ears WDL  Nose WDL  Mouth WDL  Neck WDL  Breast/Chest WDL  Shoulder Blades scabbing/rash on back (possibly reaction to defibrillator pad previously placed in that area)  Spine WDL  (R) Arm/Elbow/Hand WDL  (L) Arm/Elbow/Hand WDL  Abdomen WDL  Groin Redness and Rash  Scrotum/Coccyx/Buttocks Redness, Blanching, and Excoriation  (R) Leg WDL  (L) Leg WDL  (R) Heel/Foot/Toe Redness and Blanching  (L) Heel/Foot/Toe Redness and Blanching          Devices In Places Pulse Ox      Interventions In Place Heel Mepilex, Pillows, Q2 Turns, Low Air Loss Mattress, and Barrier Cream    Possible Skin Injury Yes    Pictures Uploaded Into Epic Yes  Wound Consult Placed Yes  RN Wound Prevention Protocol Ordered Yes

## 2024-08-02 NOTE — PROGRESS NOTES
Patient arrived to T808 with  [place] at bedside. No distress noted. Patient incontinent of stool upon transfer from wheelchair to bed. Oriented to person, month, and situation ([facility]/GI issue). Unable to state the year or city. Strip alarm in place. Placed on cardiac monitor and monitor tech notified.

## 2024-08-02 NOTE — CARE PLAN
The patient is Stable - Low risk of patient condition declining or worsening    Shift Goals  Clinical Goals: hemodynamic stability, VSS, skin breakdown prevention, fall prevention, bowel regimen management  Patient Goals: comfort  Family Goals: remain updated    Progress made toward(s) clinical / shift goals:    Problem: Pain - Standard  Goal: Alleviation of pain or a reduction in pain to the patient’s comfort goal  Outcome: Progressing   Pt reports pain well controlled with current therapy. Additional non-pharmacologic interventions implemented. Education on pain reduction goals, pain rating scale, and potential side effects of pharmacologic interventions.    Problem: Fall Risk  Goal: Patient will remain free from falls  Outcome: Progressing   Fall prevention measures in place, bed alarm on and connected correctly, call light within reach, hourly rounding, Education provided on fall prevention. Pt instructed to use call light prior to exiting the bed, educated on use of bed alarms, and risks and complications related to falls.     Problem: Bowel Elimination  Goal: Establish and maintain regular bowel function  Outcome: Progressing   BM this shift    Patient is not progressing towards the following goals:

## 2024-08-02 NOTE — PROGRESS NOTES
Hospital Medicine Daily Progress Note    Date of Service  8/2/2024    Chief Complaint  Gloria Herndon is a 83 y.o. female admitted 7/30/2024 with constipation and bradycardia.    Hospital Course  Gloria Herndon is an 83-year-old female with PMHx atrial fibrillation on Eliquis, HLD, TIA.  Admitted 7/30 for abdominal pain and rectal pain.    Patient initially presented to [facility].  Patient was found to be severely constipated and became acutely hypoxic secondary to cardiogenic shock and flash pulmonary edema.  She required intubation 7/29 and was subsequently extubated later that night.  Patient developed significant bradycardia.  Cardiology was consulted and her home medications were adjusted.  No plans for permanent pacemaker at this time.    In terms of patient's constipation she was manually disimpacted under anesthesia.  Since then she has had many bowel movements.  Repeat abdominal x-ray showing persistent ileus / evolving obstruction.       Interval Problem Update  8/2: Vital stable overnight.  Patient remains on 1 L/min supplemental O2.  Heart rate ranging 69-87.  Restart metoprolol 25 mg.  Patient continues to have overflow incontinence.  KUB completed.  Soapsuds enema today.  Continue MiraLAX.     I have discussed this patient's plan of care and discharge plan at IDT rounds today with Case Management, Nursing, Nursing leadership, and other members of the IDT team.    Consultants/Specialty  cardiology, critical care, and GI    Code Status  Full Code    Disposition  The patient is not medically cleared for discharge to home or a post-acute facility.      I have placed the appropriate orders for post-discharge needs.    Review of Systems  Review of Systems   Constitutional:  Negative for fever and malaise/fatigue.   Respiratory:  Negative for shortness of breath.    Cardiovascular:  Negative for chest pain and leg swelling.   Gastrointestinal:  Positive for abdominal pain and constipation. Negative for nausea  and vomiting.        Physical Exam  Temp:  [36.8 °C (98.2 °F)-37.2 °C (99 °F)] 36.8 °C (98.2 °F)  Pulse:  [78-87] 87  Resp:  [13-19] 18  BP: (110-146)/(58-78) 143/71  SpO2:  [90 %-94 %] 92 %    Physical Exam  Vitals and nursing note reviewed.   Constitutional:       General: She is not in acute distress.     Appearance: Normal appearance. She is ill-appearing.      Comments: Frail appearing  Mild painful distress    Cardiovascular:      Rate and Rhythm: Normal rate and regular rhythm.      Heart sounds: Murmur heard.   Pulmonary:      Effort: Pulmonary effort is normal. No respiratory distress.      Breath sounds: Normal breath sounds. No wheezing.   Abdominal:      General: Abdomen is flat. There is distension.      Comments: Mild distention present   Bowel sounds present    Skin:     General: Skin is warm and dry.   Neurological:      Mental Status: She is alert. She is disoriented.      Comments: Impaired short term memory   Psychiatric:         Mood and Affect: Mood normal.         Behavior: Behavior normal.         Fluids    Intake/Output Summary (Last 24 hours) at 8/2/2024 1148  Last data filed at 8/1/2024 1928  Gross per 24 hour   Intake 0 ml   Output --   Net 0 ml       Laboratory  Recent Labs     07/31/24  0400 08/01/24  0340 08/02/24  0345   WBC 6.0 7.1 5.6   RBC 3.56* 4.24 3.74*   HEMOGLOBIN 11.0* 13.3 11.9*   HEMATOCRIT 32.3* 38.7 34.6*   MCV 90.7 91.3 92.5   MCH 30.9 31.4 31.8   MCHC 34.1 34.4 34.4   RDW 44.0 43.8 44.5   PLATELETCT 220 277 314   MPV 9.7 9.5 9.6     Recent Labs     07/31/24  0400 08/01/24  0340 08/02/24  0345   SODIUM 136 135 134*   POTASSIUM 3.2* 4.0 3.6   CHLORIDE 103 100 100   CO2 23 23 22   GLUCOSE 120* 125* 108*   BUN 10 9 11   CREATININE 0.51 0.58 0.50   CALCIUM 8.0* 8.8 8.9     Recent Labs     07/30/24  1548   INR 1.10               Imaging  [id number] VIEW   Final Result         1.  Air-filled distended loops of bowel are seen with reactive mucosal pattern in the upper abdomen,  appearance suggests ileus or enteritis versus evolving obstructive changes. Recommend radiographic followup to resolution to exclude progression to    obstruction.      [id number] VIEW   Final Result         1.  Air-filled distended loops of bowel are seen with reactive mucosal pattern in the upper abdomen, appearance suggests ileus or enteritis. Recommend radiographic followup to resolution to exclude progression to obstruction.   2.  Large quantity of stool in the rectum, appearance suggesting rectal fecal impaction.   3.  Left lower lobe infiltrates   4.  Trace left pleural effusion      DX-CHEST-PORTABLE (1 VIEW)   Final Result         1.  Pulmonary edema and/or infiltrates are identified, which are stable since the prior exam.   2.  Trace bilateral pleural effusions   3.  Cardiomegaly   4.  Atherosclerosis           Assessment/Plan  * Tachy-napoleon syndrome (HCC)  Assessment & Plan  Patient has underlying atrial fibrillation.  She was admitted with bradycardia.  - Discontinue flecainide at this time.  Per cardiology recommendations patient will move forward with pill in pocket flecainide.  Cardiology recommending restarting metoprolol at 25 mg.  We will continue telemetry monitoring overnight to ensure that she does not develop bradycardia again.  - Continuous telemetry monitoring   - Continue to  follow with cardiology outpatient     Longstanding persistent atrial fibrillation (HCC)  Assessment & Plan  - Continue home Eliquis  - Discontinue flecainide  - Restart metoprolol 25 mg  - Continuous telemetry monitoring; monitoring closely for return of bradycardia in the setting of tachybradycardia syndrome    Other constipation  Assessment & Plan  Patient required fecal disimpaction under sedation on 7/27 with gastroenterology.  She continues to have overflow incontinence.  KUB today: Air-filled distended loops of bowel suggesting ileus or enteritis.  - Discussed with GI today  - Start soapsuds enema  - Continue  MiraLAX  - Repeat KUB tomorrow a.m.    Acute respiratory failure with hypoxia (HCC)  Assessment & Plan  Likely due to flash pulmonary edema    Intubated date: 7/29 @ [facility]  Reason intubated: Acute hypoxic respiratory failure  Extubated later the same night    RT protocols  Incentive spirometry  Currently she is requiring 1 L of oxygen to maintain oxygen saturation.  Continuous telemetry monitoring     Hypokalemia  Assessment & Plan  Potassium 3.6; replaced  - Repeat BMP in a.m.    Cardiogenic shock (HCC)  Assessment & Plan  No longer requiring vasopressor support  Close monitoring for return of hypotension or signs of shock       My total time spent caring for the patient on the day of the encounter was 56 minutes.   This does not include time spent on separately billable procedures/tests.      VTE prophylaxis:    therapeutic anticoagulation with eliquis 2.5 mg BID      I have performed a physical exam and reviewed and updated ROS and Plan today (8/2/2024). In review of yesterday's note (8/1/2024), there are no changes except as documented above.

## 2024-08-02 NOTE — CARE PLAN
Problem: Knowledge Deficit - Standard  Goal: Patient and family/care givers will demonstrate understanding of plan of care, disease process/condition, diagnostic tests and medications  Outcome: Progressing     Problem: Pain - Standard  Goal: Alleviation of pain or a reduction in pain to the patient’s comfort goal  Outcome: Progressing     Problem: Hemodynamics  Goal: Patient's hemodynamics, fluid balance and neurologic status will be stable or improve  Outcome: Progressing     Problem: Respiratory  Goal: Patient will achieve/maintain optimum respiratory ventilation and gas exchange  Outcome: Progressing     Problem: Fall Risk  Goal: Patient will remain free from falls  Outcome: Progressing     Problem: Bowel Elimination  Goal: Establish and maintain regular bowel function  Outcome: Progressing   The patient is Stable - Low risk of patient condition declining or worsening    Shift Goals  Clinical Goals: Hemodynamic stability, bowel regime management  Patient Goals: pain management  Family Goals: Increase plan of care understanding.    Progress made toward(s) clinical / shift goals:  hemodynamic stability, respiratory goals, increased bowel output.    Patient is not progressing towards the following goals:

## 2024-08-02 NOTE — WOUND TEAM
[facility]  Inpatient Services  Wound and Skin Care Brief Evaluation    Admission Date: 7/30/2024     Last order of IP CONSULT TO WOUND CARE was found on 7/30/2024 from Hospital Encounter on 7/30/2024     HPI, PMH, SH: Reviewed    No chief complaint on file.    Diagnosis: Bradycardia [R00.1]    Unit where seen by Wound Team: T808/02     Wound consult placed regarding back, sacrum, and heels. Chart and images reviewed. This clinician in to assess patient. Patient pleasant and agreeable. Patient back with excoriated rash. Steroid ointment ordered to reduce redness and itching. Patient sacrum intact with blanchable redness. Continue with barrier paste for protective layer. May use offloading dressing if no problems with incontinence. BL heels intact with blanchable redness. Continue with offloading.    No pressure injuries or advanced wound care needs identified. Wound consult completed. No further follow up unless indicated and consulted.          PREVENTATIVE INTERVENTIONS:    Q shift Isaac - performed per nursing policy  Q shift pressure point assessments - performed per nursing policy    Surface/Positioning  ICU Low Airloss - Currently in Place  Reposition q 2 hours - Ordered    Offloading/Redistribution  Heel offloading dressing (Silicone dressing) - Currently in Place      Containment/Moisture Prevention    Barrier paste - Currently in Place

## 2024-08-03 LAB
ANION GAP SERPL CALC-SCNC: 12 MMOL/L (ref 7–16)
BUN SERPL-MCNC: 11 MG/DL (ref 8–22)
CALCIUM SERPL-MCNC: 8.5 MG/DL (ref 8.5–10.5)
CHLORIDE SERPL-SCNC: 101 MMOL/L (ref 96–112)
CO2 SERPL-SCNC: 25 MMOL/L (ref 20–33)
CREAT SERPL-MCNC: 0.6 MG/DL (ref 0.5–1.4)
ERYTHROCYTE [DISTWIDTH] IN BLOOD BY AUTOMATED COUNT: 43.1 FL (ref 35.9–50)
GFR SERPLBLD CREATININE-BSD FMLA CKD-EPI: 88 ML/MIN/1.73 M 2
GLUCOSE SERPL-MCNC: 115 MG/DL (ref 65–99)
HCT VFR BLD AUTO: 32.2 % (ref 37–47)
HGB BLD-MCNC: 11.2 G/DL (ref 12–16)
MAGNESIUM SERPL-MCNC: 2.1 MG/DL (ref 1.5–2.5)
MCH RBC QN AUTO: 31.6 PG (ref 27–33)
MCHC RBC AUTO-ENTMCNC: 34.8 G/DL (ref 32.2–35.5)
MCV RBC AUTO: 91 FL (ref 81.4–97.8)
PLATELET # BLD AUTO: 301 K/UL (ref 164–446)
PMV BLD AUTO: 8.9 FL (ref 9–12.9)
POTASSIUM SERPL-SCNC: 3.6 MMOL/L (ref 3.6–5.5)
RBC # BLD AUTO: 3.54 M/UL (ref 4.2–5.4)
SODIUM SERPL-SCNC: 138 MMOL/L (ref 135–145)
WBC # BLD AUTO: 4.3 K/UL (ref 4.8–10.8)

## 2024-08-03 PROCEDURE — RXMED WILLOW AMBULATORY MEDICATION CHARGE: Performed by: STUDENT IN AN ORGANIZED HEALTH CARE EDUCATION/TRAINING PROGRAM

## 2024-08-03 PROCEDURE — A9270 NON-COVERED ITEM OR SERVICE: HCPCS | Performed by: STUDENT IN AN ORGANIZED HEALTH CARE EDUCATION/TRAINING PROGRAM

## 2024-08-03 PROCEDURE — 770020 HCHG ROOM/CARE - TELE (206)

## 2024-08-03 PROCEDURE — A9270 NON-COVERED ITEM OR SERVICE: HCPCS

## 2024-08-03 PROCEDURE — 80048 BASIC METABOLIC PNL TOTAL CA: CPT

## 2024-08-03 PROCEDURE — 700102 HCHG RX REV CODE 250 W/ 637 OVERRIDE(OP): Performed by: STUDENT IN AN ORGANIZED HEALTH CARE EDUCATION/TRAINING PROGRAM

## 2024-08-03 PROCEDURE — 83735 ASSAY OF MAGNESIUM: CPT

## 2024-08-03 PROCEDURE — 700102 HCHG RX REV CODE 250 W/ 637 OVERRIDE(OP)

## 2024-08-03 PROCEDURE — 36415 COLL VENOUS BLD VENIPUNCTURE: CPT

## 2024-08-03 PROCEDURE — 85027 COMPLETE CBC AUTOMATED: CPT

## 2024-08-03 PROCEDURE — 99233 SBSQ HOSP IP/OBS HIGH 50: CPT | Performed by: STUDENT IN AN ORGANIZED HEALTH CARE EDUCATION/TRAINING PROGRAM

## 2024-08-03 RX ORDER — MIDAZOLAM HYDROCHLORIDE 1 MG/ML
1 INJECTION INTRAMUSCULAR; INTRAVENOUS ONCE
Status: DISCONTINUED | OUTPATIENT
Start: 2024-08-03 | End: 2024-08-03

## 2024-08-03 RX ORDER — METOPROLOL SUCCINATE 25 MG/1
25 TABLET, EXTENDED RELEASE ORAL DAILY
Qty: 30 TABLET | Refills: 3 | Status: SHIPPED | OUTPATIENT
Start: 2024-08-04 | End: 2024-08-19

## 2024-08-03 RX ORDER — CLONAZEPAM 0.5 MG/1
0.5 TABLET ORAL NIGHTLY PRN
Status: DISCONTINUED | OUTPATIENT
Start: 2024-08-03 | End: 2024-08-04 | Stop reason: HOSPADM

## 2024-08-03 RX ADMIN — Medication: at 15:00

## 2024-08-03 RX ADMIN — POLYETHYLENE GLYCOL 3350 1 PACKET: 17 POWDER, FOR SOLUTION ORAL at 04:57

## 2024-08-03 RX ADMIN — Medication: at 08:32

## 2024-08-03 RX ADMIN — Medication 5 MG: at 20:29

## 2024-08-03 RX ADMIN — BISACODYL 10 MG: 10 SUPPOSITORY RECTAL at 05:00

## 2024-08-03 RX ADMIN — APIXABAN 2.5 MG: 2.5 TABLET, FILM COATED ORAL at 17:48

## 2024-08-03 RX ADMIN — TRIAMCINOLONE ACETONIDE: 1 OINTMENT TOPICAL at 05:00

## 2024-08-03 RX ADMIN — Medication: at 20:29

## 2024-08-03 RX ADMIN — MAGNESIUM HYDROXIDE 30 ML: 1200 LIQUID ORAL at 17:48

## 2024-08-03 RX ADMIN — CLONAZEPAM 0.5 MG: 0.5 TABLET ORAL at 20:29

## 2024-08-03 RX ADMIN — METOPROLOL SUCCINATE 25 MG: 25 TABLET, EXTENDED RELEASE ORAL at 04:54

## 2024-08-03 RX ADMIN — TRIAMCINOLONE ACETONIDE: 1 OINTMENT TOPICAL at 18:00

## 2024-08-03 RX ADMIN — POLYETHYLENE GLYCOL 3350 1 PACKET: 17 POWDER, FOR SOLUTION ORAL at 17:48

## 2024-08-03 RX ADMIN — POLYETHYLENE GLYCOL 3350 1 PACKET: 17 POWDER, FOR SOLUTION ORAL at 11:10

## 2024-08-03 RX ADMIN — APIXABAN 2.5 MG: 2.5 TABLET, FILM COATED ORAL at 04:54

## 2024-08-03 ASSESSMENT — ENCOUNTER SYMPTOMS
ABDOMINAL PAIN: 1
SHORTNESS OF BREATH: 0
NAUSEA: 0
FEVER: 0
CONSTIPATION: 1
VOMITING: 0

## 2024-08-03 ASSESSMENT — COGNITIVE AND FUNCTIONAL STATUS - GENERAL
SUGGESTED CMS G CODE MODIFIER DAILY ACTIVITY: CJ
MOBILITY SCORE: 19
DRESSING REGULAR LOWER BODY CLOTHING: A LITTLE
MOVING TO AND FROM BED TO CHAIR: A LITTLE
SUGGESTED CMS G CODE MODIFIER MOBILITY: CK
DAILY ACTIVITIY SCORE: 20
STANDING UP FROM CHAIR USING ARMS: A LITTLE
WALKING IN HOSPITAL ROOM: A LITTLE
DRESSING REGULAR UPPER BODY CLOTHING: A LITTLE
TOILETING: A LITTLE
CLIMB 3 TO 5 STEPS WITH RAILING: A LOT
HELP NEEDED FOR BATHING: A LITTLE

## 2024-08-03 ASSESSMENT — PAIN DESCRIPTION - PAIN TYPE
TYPE: ACUTE PAIN

## 2024-08-03 ASSESSMENT — FIBROSIS 4 INDEX: FIB4 SCORE: 1.47

## 2024-08-03 NOTE — CARE PLAN
The patient is Stable - Low risk of patient condition declining or worsening    Shift Goals  Clinical Goals: Bowel protocol, VSS, skin breakdown prevention, increase mobility tolerance, fall prevention  Patient Goals: comfort. rest  Family Goals: remain updated, involved in care    Progress made toward(s) clinical / shift goals:    Problem: Knowledge Deficit - Standard  Goal: Patient and family/care givers will demonstrate understanding of plan of care, disease process/condition, diagnostic tests and medications  Outcome: Progressing   Discussed plan of care, pt able to actively participate in the learning process and demonstrates understanding by return demonstration or return explanation. Improved ability to communicate regarding their healthcare and current admission. Improved ability to identify barriers to learning and care.    Problem: Pain - Standard  Goal: Alleviation of pain or a reduction in pain to the patient’s comfort goal  Outcome: Progressing   Pt reports pain well controlled with current therapy. Additional non-pharmacologic interventions implemented. Education on pain reduction goals, pain rating scale, and potential side effects of pharmacologic interventions.    Problem: Fall Risk  Goal: Patient will remain free from falls  Outcome: Progressing   Fall prevention measures in place, bed alarm on and connected correctly, call light within reach, hourly rounding, Education provided on fall prevention. Pt instructed to use call light prior to exiting the bed, educated on use of bed alarms, and risks and complications related to falls.       Patient is not progressing towards the following goals:

## 2024-08-03 NOTE — PROGRESS NOTES
Hospital Medicine Daily Progress Note    Date of Service  8/3/2024    Chief Complaint  Gloria Herndon is a 83 y.o. female admitted 7/30/2024 with constipation and bradycardia.    Hospital Course  Gloria Herndon is an 83-year-old female with PMHx atrial fibrillation on Eliquis, HLD, TIA.  Admitted 7/30 for abdominal pain and rectal pain.    Patient initially presented to [facility].  Patient was found to be severely constipated and became acutely hypoxic secondary to cardiogenic shock and flash pulmonary edema.  She required intubation 7/29 and was subsequently extubated later that night.  Patient developed significant bradycardia.  Cardiology was consulted and her home medications were adjusted.  No plans for permanent pacemaker at this time.    In terms of patient's constipation she was manually disimpacted under anesthesia.  Since then she has had many bowel movements.  Repeat abdominal x-ray showing persistent ileus / evolving obstruction.       Interval Problem Update  8/2: Vital stable overnight.  Patient remains on 1 L/min supplemental O2.  Heart rate ranging 69-87.  Restart metoprolol 25 mg.  Patient continues to have overflow incontinence.  KUB completed.  Soapsuds enema today.  Continue MiraLAX.     8/3: Vitals notable for heart rate remaining 60s to 80s after the addition of metoprolol 25 mg. Discussed fecal impaction with gastroenterology this AM. Planning for bedside gentle sedation with disimpaction. However, patient then had a large, firm bowel movement. Continue aggressive bowel regimen today. Plan for discharge home tomorrow if constipation continues to improve.     I have discussed this patient's plan of care and discharge plan at IDT rounds today with Case Management, Nursing, Nursing leadership, and other members of the IDT team.    Consultants/Specialty  cardiology, critical care, and GI    Code Status  Full Code    Disposition  The patient is not medically cleared for discharge to home or a  post-acute facility.  Anticipate discharge to: home with close outpatient follow-up    I have placed the appropriate orders for post-discharge needs.    Review of Systems  Review of Systems   Constitutional:  Negative for fever and malaise/fatigue.   Respiratory:  Negative for shortness of breath.    Cardiovascular:  Negative for chest pain and leg swelling.   Gastrointestinal:  Positive for abdominal pain and constipation. Negative for nausea and vomiting.        Physical Exam  Temp:  [36.2 °C (97.2 °F)-37.3 °C (99.1 °F)] 36.8 °C (98.2 °F)  Pulse:  [60-79] 64  Resp:  [14-18] 14  BP: (106-138)/(56-70) 137/60  SpO2:  [91 %-94 %] 93 %    Physical Exam  Vitals and nursing note reviewed.   Constitutional:       General: She is not in acute distress.     Appearance: Normal appearance. She is ill-appearing.      Comments: Frail appearing  Mild painful distress    Cardiovascular:      Rate and Rhythm: Normal rate and regular rhythm.      Heart sounds: Murmur heard.   Pulmonary:      Effort: Pulmonary effort is normal. No respiratory distress.      Breath sounds: Normal breath sounds. No wheezing.   Abdominal:      General: Abdomen is flat. There is distension.      Comments: Mild distention present; mild tenderness to palpation of LLQ  Bowel sounds present    Skin:     General: Skin is warm and dry.   Neurological:      Mental Status: She is alert. She is disoriented.      Comments: Impaired short term memory   Psychiatric:         Mood and Affect: Mood normal.         Behavior: Behavior normal.         Fluids    Intake/Output Summary (Last 24 hours) at 8/3/2024 1119  Last data filed at 8/3/2024 0403  Gross per 24 hour   Intake 740 ml   Output 0 ml   Net 740 ml       Laboratory  Recent Labs     08/01/24  0340 08/02/24  0345 08/03/24  0210   WBC 7.1 5.6 4.3*   RBC 4.24 3.74* 3.54*   HEMOGLOBIN 13.3 11.9* 11.2*   HEMATOCRIT 38.7 34.6* 32.2*   MCV 91.3 92.5 91.0   MCH 31.4 31.8 31.6   MCHC 34.4 34.4 34.8   RDW 43.8 44.5 43.1    PLATELETCT 277 314 301   MPV 9.5 9.6 8.9*     Recent Labs     08/01/24  0340 08/02/24  0345 08/03/24  0210   SODIUM 135 134* 138   POTASSIUM 4.0 3.6 3.6   CHLORIDE 100 100 101   CO2 23 22 25   GLUCOSE 125* 108* 115*   BUN 9 11 11   CREATININE 0.58 0.50 0.60   CALCIUM 8.8 8.9 8.5                     Imaging  [id number] VIEW   Final Result         1.  Air-filled distended loops of bowel are seen with reactive mucosal pattern in the upper abdomen, appearance suggests ileus or enteritis versus evolving obstructive changes. Recommend radiographic followup to resolution to exclude progression to    obstruction.      [id number] VIEW   Final Result         1.  Air-filled distended loops of bowel are seen with reactive mucosal pattern in the upper abdomen, appearance suggests ileus or enteritis. Recommend radiographic followup to resolution to exclude progression to obstruction.   2.  Large quantity of stool in the rectum, appearance suggesting rectal fecal impaction.   3.  Left lower lobe infiltrates   4.  Trace left pleural effusion      DX-CHEST-PORTABLE (1 VIEW)   Final Result         1.  Pulmonary edema and/or infiltrates are identified, which are stable since the prior exam.   2.  Trace bilateral pleural effusions   3.  Cardiomegaly   4.  Atherosclerosis           Assessment/Plan  * Tachy-napoleon syndrome (HCC)  Assessment & Plan  Patient has underlying atrial fibrillation.  She was admitted with bradycardia.  - Discontinue flecainide at this time.  Per cardiology recommendations patient will move forward with pill in pocket flecainide.  Cardiology recommending restarting metoprolol at 25 mg.   - Patient tolerated metoprolol 25mg restart well. No return of bradycardia overnight   - Continuous telemetry monitoring   - Continue to  follow with cardiology outpatient     Longstanding persistent atrial fibrillation (HCC)  Assessment & Plan  - Continue home Eliquis  - Discontinue flecainide; transition to as needed  flecainide   - Restart metoprolol 25 mg  - Continuous telemetry monitoring; monitoring closely for return of bradycardia in the setting of tachybradycardia syndrome    Other constipation  Assessment & Plan  Patient required fecal disimpaction under sedation on 7/27 with gastroenterology.  She continues to have overflow incontinence.  KUB today: Air-filled distended loops of bowel suggesting ileus or enteritis.  - Discussed with GI today; planning for disimpaction with moderate sedation at bedside; however patient had a large, firm BM today  - Continue suppository today  - Continue milk of mag today   - Continue MiraLAX  - Close monitoring for BM; if improving; can discharge home tomorrow     Acute respiratory failure with hypoxia (HCC)  Assessment & Plan  Likely due to flash pulmonary edema    Intubated date: 7/29 @ [facility]  Reason intubated: Acute hypoxic respiratory failure  Extubated later the same night    RT protocols  Incentive spirometry  Currently room air to intermittently requiring 1LPM supplemental oxygen  Continuous telemetry monitoring     Hypokalemia  Assessment & Plan  Potassium 3.6; replaced  - Repeat BMP in a.m.    Cardiogenic shock (HCC)  Assessment & Plan  No longer requiring vasopressor support  Close monitoring for return of hypotension or signs of shock       My total time spent caring for the patient on the day of the encounter was 57 minutes.   This does not include time spent on separately billable procedures/tests.    VTE prophylaxis:   SCDs/TEDs   therapeutic anticoagulation with eliquis 2.5 mg BID      I have performed a physical exam and reviewed and updated ROS and Plan today (8/3/2024). In review of yesterday's note (8/2/2024), there are no changes except as documented above.

## 2024-08-03 NOTE — CARE PLAN
Problem: Knowledge Deficit - Standard  Goal: Patient and family/care givers will demonstrate understanding of plan of care, disease process/condition, diagnostic tests and medications  Outcome: Progressing   The patient is Stable - Low risk of patient condition declining or worsening    Shift Goals  Clinical Goals: bowl protocol, fall preventiopn, skin breakdown prevention  Patient Goals: rest  Family Goals: updated in plan of care    Progress made toward(s) clinical / shift goals:  bowel medications given, patient had a bowel movement    Patient is not progressing towards the following goals: n/a

## 2024-08-03 NOTE — PROGRESS NOTES
Bedside report received from off going RN/tech: Gayle, assumed care of patient.     Fall Risk Score: HIGH RISK  Fall risk interventions in place: Place yellow fall risk ID band on patient, Provide patient/family education based on risk assessment, Educate patient/family to call staff for assistance when getting out of bed, Place fall precaution signage outside patient door, Place patient in room close to nursing station, Utilize bed/chair fall alarm, Notify charge of high risk for huddle, and Bed alarm connected correctly  Bed type: Low air loss (Isaac Score less than 17 interventions in place)  Patient on cardiac monitor: Yes  IVF/IV medications: Not Applicable   Oxygen: Room Air  Bedside sitter: Not Applicable   Isolation: Not applicable    Pt opens eyes spontaneously and tracks RN, is disoriented in conversation: disoriented to time, disoriented to event, self,, and location,, follows commands. A&Ox2    Bedside report received from day shift RN. Pt is currently A&Ox2, SR, breathing at a normal rate and rhythm, warm, dry, and skin color appropriate for ethnicity, and in no visible emotional or physical distress. Bed locked in lowest position, call light is within reach, fall prevention measures in place. Pt educated to use call light before getting out of bed, pt verbalized understanding. Pt is on a cardiac monitor, order verified, transmitter connected appropriately, and leads placed correctly, rhythm and rate assessed by RN, monitor staff updated of changes and parameters as necessary.  No further needs at this time.

## 2024-08-03 NOTE — DOCUMENTATION QUERY
[facility]                                                                       Query Response Note      PATIENT:               LAST TAVERAS  ACCT #:                  [id number]  MRN:                     [id number]  :                      1940  ADMIT DATE:       2024 2:54 PM  DISCH DATE:          RESPONDING  PROVIDER #:        [id number]           QUERY TEXT:    Patient transferred for tachy-napoleon syndrome.  There is conflicting documentation regarding atrial fibrillation.    Paroxysmal atrial fibrillation is documented by cardiology and longstanding persistent atrial fibrillation is documented by hospital medicine.  Per H&P - Atrial fibrillation (S/p Ablation in 2017 and cardioversion x 2 in the last 2 years).    Please specify the type of atrial fibrillation based on the clinical indicators documented.    The patient's Clinical Indicators include:  H&P - Atrial fibrillation (S/p Ablation in 2017 and cardioversion x 2 in the last 2 years) on eliquis, metoprolol and flecainide   Cardiology consult - paroxysmal atrial fibrillation with prior ablation therapy back in 2017    Treatment - PO Eliquis; EKG; Hold metoprolol    Risk factors - Atrial fibrillation    Thank you,  Lily HAIRSTON  Clinical   Connect via [employer]  Options provided:   -- Paroxysmal atrial fibrillation (self-terminating or intermittent spontaneously or with intervention within 7 days of onset)   -- Longstanding persistent atrial fibrillation (AF that is persistent and continuous, lasting longer than 1 year)   -- Other explanation, (please specify other explanation)      Query created by: Lily Amin on 2024 4:26 PM    RESPONSE TEXT:    Longstanding persistent atrial fibrillation (AF that is persistent and continuous, lasting longer than 1 year)          Electronically signed by:  NICKIE NEWELL MD  8/3/2024 6:28 AM

## 2024-08-04 ENCOUNTER — PHARMACY VISIT (OUTPATIENT)
Dept: PHARMACY | Facility: MEDICAL CENTER | Age: 84
End: 2024-08-04
Payer: COMMERCIAL

## 2024-08-04 VITALS
SYSTOLIC BLOOD PRESSURE: 110 MMHG | WEIGHT: 142.64 LBS | DIASTOLIC BLOOD PRESSURE: 55 MMHG | HEIGHT: 67 IN | BODY MASS INDEX: 22.39 KG/M2 | RESPIRATION RATE: 16 BRPM | TEMPERATURE: 97.9 F | HEART RATE: 63 BPM | OXYGEN SATURATION: 92 %

## 2024-08-04 PROCEDURE — A9270 NON-COVERED ITEM OR SERVICE: HCPCS

## 2024-08-04 PROCEDURE — RXMED WILLOW AMBULATORY MEDICATION CHARGE: Performed by: STUDENT IN AN ORGANIZED HEALTH CARE EDUCATION/TRAINING PROGRAM

## 2024-08-04 PROCEDURE — 700102 HCHG RX REV CODE 250 W/ 637 OVERRIDE(OP): Performed by: STUDENT IN AN ORGANIZED HEALTH CARE EDUCATION/TRAINING PROGRAM

## 2024-08-04 PROCEDURE — A9270 NON-COVERED ITEM OR SERVICE: HCPCS | Performed by: STUDENT IN AN ORGANIZED HEALTH CARE EDUCATION/TRAINING PROGRAM

## 2024-08-04 PROCEDURE — 700102 HCHG RX REV CODE 250 W/ 637 OVERRIDE(OP)

## 2024-08-04 PROCEDURE — 99239 HOSP IP/OBS DSCHRG MGMT >30: CPT | Performed by: STUDENT IN AN ORGANIZED HEALTH CARE EDUCATION/TRAINING PROGRAM

## 2024-08-04 RX ORDER — POLYETHYLENE GLYCOL 3350 17 G/17G
17 POWDER ORAL 2 TIMES DAILY PRN
Qty: 340 G | Refills: 3 | Status: SHIPPED | OUTPATIENT
Start: 2024-08-04

## 2024-08-04 RX ORDER — TRAZODONE HYDROCHLORIDE 50 MG/1
50 TABLET, FILM COATED ORAL NIGHTLY
Qty: 30 TABLET | Refills: 3 | Status: SHIPPED | OUTPATIENT
Start: 2024-08-04

## 2024-08-04 RX ORDER — TRIAMCINOLONE ACETONIDE 1 MG/G
1 OINTMENT TOPICAL 2 TIMES DAILY
Qty: 15 G | Refills: 0 | Status: SHIPPED | OUTPATIENT
Start: 2024-08-04 | End: 2024-08-09

## 2024-08-04 RX ORDER — BISACODYL 10 MG
10 SUPPOSITORY, RECTAL RECTAL DAILY
Qty: 12 SUPPOSITORY | Refills: 3 | Status: SHIPPED | OUTPATIENT
Start: 2024-08-04

## 2024-08-04 RX ORDER — DOCUSATE SODIUM 100 MG/1
100 CAPSULE, LIQUID FILLED ORAL 2 TIMES DAILY PRN
Qty: 30 CAPSULE | Refills: 3 | Status: SHIPPED | OUTPATIENT
Start: 2024-08-04

## 2024-08-04 RX ADMIN — METOPROLOL SUCCINATE 25 MG: 25 TABLET, EXTENDED RELEASE ORAL at 04:51

## 2024-08-04 RX ADMIN — APIXABAN 2.5 MG: 2.5 TABLET, FILM COATED ORAL at 04:51

## 2024-08-04 RX ADMIN — Medication: at 09:00

## 2024-08-04 RX ADMIN — TRIAMCINOLONE ACETONIDE: 1 OINTMENT TOPICAL at 04:51

## 2024-08-04 ASSESSMENT — PAIN DESCRIPTION - PAIN TYPE
TYPE: ACUTE PAIN
TYPE: ACUTE PAIN

## 2024-08-04 ASSESSMENT — FIBROSIS 4 INDEX: FIB4 SCORE: 1.47

## 2024-08-04 NOTE — PROGRESS NOTES
Monitor Summary  Rhythm: sinus rhythm  Rate: 63-76  Ectopy: PVC, PAC  .18 / .09 / .40

## 2024-08-04 NOTE — PROGRESS NOTES
Bedside report received from off going RN/tech: Keturah, assumed care of patient.      Fall Risk Score: HIGH RISK  Fall risk interventions in place: Place yellow fall risk ID band on patient, Provide patient/family education based on risk assessment, Educate patient/family to call staff for assistance when getting out of bed, Place fall precaution signage outside patient door, Utilize bed/chair fall alarm, and Bed alarm connected correctly  Bed type: Low air loss (Isaac Score less than 17 interventions in place)  Patient on cardiac monitor: Yes  IVF/IV medications: Not Applicable   Oxygen: Room Air  Bedside sitter: Not Applicable   Isolation: Not applicable

## 2024-08-04 NOTE — DISCHARGE SUMMARY
Discharge Summary    CHIEF COMPLAINT ON ADMISSION  No chief complaint on file.      Reason for Admission  Bradycardia     Admission Date  7/30/2024    CODE STATUS  Full Code    HPI & HOSPITAL COURSE    Gloria Herndon is an 83-year-old female with PMHx atrial fibrillation on Eliquis, HLD, TIA.  Admitted 7/30 for abdominal pain and rectal pain.    Patient initially presented to [facility].  Patient was found to be severely constipated and became acutely hypoxic secondary to cardiogenic shock and flash pulmonary edema.  She required intubation 7/29 and was subsequently extubated later that night.  Patient developed significant bradycardia.  Cardiology was consulted and her home medications were adjusted.  No plans for permanent pacemaker at this time.  Bradycardia improved with discontinuation of flecainide and a lower dose of metoprolol.    Moving forward patient will take metoprolol 25 mg daily for her atrial fibrillation.  She will keep a flecainide pill on her person at all times for as needed palpitations.  She will follow closely with cardiology.  Patient will continue to take her Eliquis every day.    In terms of patient's constipation she was manually disimpacted under anesthesia.  Patient required aggressive bowel regimen after disimpaction.  At time of discharge she was having regular, soft bowel movements.  I have prescribed MiraLAX, Colace, suppositories as needed for constipation at home.    Additionally, due to complicated and prolonged hospitalization-Home health has been established.    Therefore, she is discharged in good and stable condition to home with organized home healthcare and close outpatient follow-up.    The patient met 2-midnight criteria for an inpatient stay at the time of discharge.    Discharge Date  8/4/2024    FOLLOW UP ITEMS POST DISCHARGE  Follow-up with home health 1 to 2 days  Follow-up with cardiology 1 week  Follow-up with primary care physician in 3 to 5 days    DISCHARGE  DIAGNOSES  Principal Problem:    Tachy-napoleon syndrome (HCC) (POA: Unknown)  Active Problems:    Mild cognitive impairment (POA: Yes)    Cardiogenic shock (HCC) (POA: Unknown)    Hypokalemia (POA: Unknown)    Acute respiratory failure with hypoxia (HCC) (POA: Unknown)    Other constipation (POA: Unknown)    Longstanding persistent atrial fibrillation (HCC) (POA: Unknown)  Resolved Problems:    * No resolved hospital problems. *      FOLLOW UP  Future Appointments   Date Time Provider Department Center   4/21/2025 10:40 AM KARI Taylor None     No follow-up provider specified.    MEDICATIONS ON DISCHARGE     Medication List        START taking these medications        Instructions   docusate sodium 100 MG Caps  Commonly known as: Colace   Take 1 Capsule by mouth 2 times a day as needed for Constipation.  Dose: 100 mg     PEG 3350 17 GM/SCOOP Powd   DISSOLVE 17g in liquid and take by mouth 2 times a day as needed (constipation).  Dose: 17 g     traZODone 50 MG Tabs  Commonly known as: Desyrel   Take 1 Tablet by mouth every evening.  Dose: 50 mg     triamcinolone acetonide 0.1 % Oint  Commonly known as: Kenalog   Apply 1 Each topically 2 times a day for 5 days.  Dose: 1 Each            CHANGE how you take these medications        Instructions   bisacodyl 10 MG Supp  What changed:   when to take this  reasons to take this  Commonly known as: Dulcolax   Insert 1 Suppository into the rectum every day.  Dose: 10 mg     metoprolol SR 25 MG Tb24  What changed:   medication strength  how much to take  Commonly known as: Toprol XL   Take 1 Tablet by mouth every day.  Dose: 25 mg            CONTINUE taking these medications        Instructions   acyclovir 400 MG tablet  Commonly known as: Zovirax   Take 400 mg by mouth 3 times a day as needed (Outbreak). Take for 5 days at first sign of cold sore.  Dose: 400 mg     apixaban 2.5mg Tabs  Commonly known as: Eliquis   Take 1 Tablet by mouth 2 times a day.  Dose: 2.5 mg      Evolocumab 140 MG/ML Soaj SubQ injection pen  Commonly known as: REPATHA   Inject 1 mL under the skin every 14 days.  Dose: 140 mg     hydrocortisone 2.5 % Oint   Apply 1 Application topically at bedtime as needed (sore bottom).  Dose: 1 Application            STOP taking these medications      cephALEXin 500 MG Caps  Commonly known as: Keflex     clonazePAM 0.5 MG Tabs  Commonly known as: KlonoPIN     enoxaparin 40 MG/0.4ML Sosy inj  Commonly known as: Lovenox     flecainide 100 MG Tabs  Commonly known as: Tambocor              Allergies  Allergies   Allergen Reactions    Blue Dyes Vomiting    Green Dyes Vomiting    Iodine Hives    Iodine Solution [Povidone Iodine] Unspecified     Red spots, pain    Omeprazole Unspecified     Urinary incontinence     Tape Unspecified     Welts on skin, paper tape is ok       DIET  Orders Placed This Encounter   Procedures    Diet Order Diet: Regular; Miscellaneous modifications: (optional): Vegetarian; Additive: (caffeine free)     Standing Status:   Standing     Number of Occurrences:   1     Order Specific Question:   Diet:     Answer:   Regular [1]     Order Specific Question:   Miscellaneous modifications: (optional)     Answer:   Vegetarian [13]       ACTIVITY  As tolerated.  Weight bearing as tolerated    CONSULTATIONS  Cardiology  Gastroenterology  Critical care    PROCEDURES  Intubation 7/29; extubation 7/29    LABORATORY  Lab Results   Component Value Date    SODIUM 138 08/03/2024    POTASSIUM 3.6 08/03/2024    CHLORIDE 101 08/03/2024    CO2 25 08/03/2024    GLUCOSE 115 (H) 08/03/2024    BUN 11 08/03/2024    CREATININE 0.60 08/03/2024        Lab Results   Component Value Date    WBC 4.3 (L) 08/03/2024    HEMOGLOBIN 11.2 (L) 08/03/2024    HEMATOCRIT 32.2 (L) 08/03/2024    PLATELETCT 301 08/03/2024        Total time of the discharge process exceeds 55 minutes.

## 2024-08-04 NOTE — PROGRESS NOTES
Reviewed discharge instructions with patient and spouse. PIV removed. Meds to beds reviewed and given to patient. Patient discharged to home.

## 2024-08-04 NOTE — CARE PLAN
The patient is Watcher - Medium risk of patient condition declining or worsening    Shift Goals  Clinical Goals: Bowel protocol, monitor VS, provide rest  Patient Goals: Rest  Family Goals: Updates    Progress made toward(s) clinical / shift goals:    Problem: Knowledge Deficit - Standard  Goal: Patient and family/care givers will demonstrate understanding of plan of care, disease process/condition, diagnostic tests and medications  Outcome: Progressing     Problem: Pain - Standard  Goal: Alleviation of pain or a reduction in pain to the patient’s comfort goal  Outcome: Progressing     Problem: Hemodynamics  Goal: Patient's hemodynamics, fluid balance and neurologic status will be stable or improve  Outcome: Progressing     Problem: Respiratory  Goal: Patient will achieve/maintain optimum respiratory ventilation and gas exchange  Outcome: Progressing     Problem: Bowel Elimination  Goal: Establish and maintain regular bowel function  Outcome: Progressing

## 2024-08-04 NOTE — DISCHARGE PLANNING
0585  Received Choice form at 1022  Agency/Facility Name: [facility]   Referral sent per Choice form @ 4110

## 2024-08-14 ENCOUNTER — APPOINTMENT (OUTPATIENT)
Dept: RADIOLOGY | Facility: MEDICAL CENTER | Age: 84
End: 2024-08-14
Attending: EMERGENCY MEDICINE
Payer: MEDICARE

## 2024-08-14 ENCOUNTER — HOSPITAL ENCOUNTER (EMERGENCY)
Facility: MEDICAL CENTER | Age: 84
End: 2024-08-14
Attending: EMERGENCY MEDICINE
Payer: MEDICARE

## 2024-08-14 VITALS
HEIGHT: 67 IN | HEART RATE: 70 BPM | DIASTOLIC BLOOD PRESSURE: 57 MMHG | TEMPERATURE: 98.4 F | SYSTOLIC BLOOD PRESSURE: 118 MMHG | RESPIRATION RATE: 20 BRPM | BODY MASS INDEX: 20.62 KG/M2 | WEIGHT: 131.39 LBS | OXYGEN SATURATION: 93 %

## 2024-08-14 DIAGNOSIS — N39.0 ACUTE UTI: ICD-10-CM

## 2024-08-14 DIAGNOSIS — N32.89 BLADDER WALL THICKENING: ICD-10-CM

## 2024-08-14 LAB
ALBUMIN SERPL BCP-MCNC: 4.1 G/DL (ref 3.2–4.9)
ALBUMIN/GLOB SERPL: 1.2 G/DL
ALP SERPL-CCNC: 109 U/L (ref 30–99)
ALT SERPL-CCNC: 19 U/L (ref 2–50)
ANION GAP SERPL CALC-SCNC: 12 MMOL/L (ref 7–16)
APPEARANCE UR: ABNORMAL
AST SERPL-CCNC: 28 U/L (ref 12–45)
BACTERIA #/AREA URNS HPF: ABNORMAL /HPF
BASOPHILS # BLD AUTO: 1 % (ref 0–1.8)
BASOPHILS # BLD: 0.05 K/UL (ref 0–0.12)
BILIRUB SERPL-MCNC: 0.4 MG/DL (ref 0.1–1.5)
BILIRUB UR QL STRIP.AUTO: NEGATIVE
BUN SERPL-MCNC: 8 MG/DL (ref 8–22)
CALCIUM ALBUM COR SERPL-MCNC: 10.1 MG/DL (ref 8.5–10.5)
CALCIUM SERPL-MCNC: 10.2 MG/DL (ref 8.4–10.2)
CHLORIDE SERPL-SCNC: 101 MMOL/L (ref 96–112)
CO2 SERPL-SCNC: 26 MMOL/L (ref 20–33)
COLOR UR: YELLOW
CREAT SERPL-MCNC: 0.73 MG/DL (ref 0.5–1.4)
EOSINOPHIL # BLD AUTO: 0.04 K/UL (ref 0–0.51)
EOSINOPHIL NFR BLD: 0.8 % (ref 0–6.9)
EPI CELLS #/AREA URNS HPF: ABNORMAL /HPF
ERYTHROCYTE [DISTWIDTH] IN BLOOD BY AUTOMATED COUNT: 46 FL (ref 35.9–50)
GFR SERPLBLD CREATININE-BSD FMLA CKD-EPI: 81 ML/MIN/1.73 M 2
GLOBULIN SER CALC-MCNC: 3.3 G/DL (ref 1.9–3.5)
GLUCOSE SERPL-MCNC: 114 MG/DL (ref 65–99)
GLUCOSE UR STRIP.AUTO-MCNC: NEGATIVE MG/DL
HCT VFR BLD AUTO: 43.5 % (ref 37–47)
HGB BLD-MCNC: 14.5 G/DL (ref 12–16)
HYALINE CASTS #/AREA URNS LPF: ABNORMAL /LPF
IMM GRANULOCYTES # BLD AUTO: 0.01 K/UL (ref 0–0.11)
IMM GRANULOCYTES NFR BLD AUTO: 0.2 % (ref 0–0.9)
KETONES UR STRIP.AUTO-MCNC: NEGATIVE MG/DL
LEUKOCYTE ESTERASE UR QL STRIP.AUTO: ABNORMAL
LIPASE SERPL-CCNC: 47 U/L (ref 11–82)
LYMPHOCYTES # BLD AUTO: 1.58 K/UL (ref 1–4.8)
LYMPHOCYTES NFR BLD: 30.4 % (ref 22–41)
MCH RBC QN AUTO: 31.2 PG (ref 27–33)
MCHC RBC AUTO-ENTMCNC: 33.3 G/DL (ref 32.2–35.5)
MCV RBC AUTO: 93.5 FL (ref 81.4–97.8)
MICRO URNS: ABNORMAL
MONOCYTES # BLD AUTO: 0.39 K/UL (ref 0–0.85)
MONOCYTES NFR BLD AUTO: 7.5 % (ref 0–13.4)
NEUTROPHILS # BLD AUTO: 3.13 K/UL (ref 1.82–7.42)
NEUTROPHILS NFR BLD: 60.1 % (ref 44–72)
NITRITE UR QL STRIP.AUTO: POSITIVE
NRBC # BLD AUTO: 0 K/UL
NRBC BLD-RTO: 0 /100 WBC (ref 0–0.2)
PH UR STRIP.AUTO: 8 [PH] (ref 5–8)
PLATELET # BLD AUTO: 496 K/UL (ref 164–446)
PMV BLD AUTO: 9.9 FL (ref 9–12.9)
POTASSIUM SERPL-SCNC: 4.8 MMOL/L (ref 3.6–5.5)
PROT SERPL-MCNC: 7.4 G/DL (ref 6–8.2)
PROT UR QL STRIP: NEGATIVE MG/DL
RBC # BLD AUTO: 4.65 M/UL (ref 4.2–5.4)
RBC # URNS HPF: ABNORMAL /HPF
RBC UR QL AUTO: ABNORMAL
SODIUM SERPL-SCNC: 139 MMOL/L (ref 135–145)
SP GR UR STRIP.AUTO: 1.01
WBC # BLD AUTO: 5.2 K/UL (ref 4.8–10.8)
WBC #/AREA URNS HPF: ABNORMAL /HPF

## 2024-08-14 PROCEDURE — 303105 HCHG CATHETER EXTRA

## 2024-08-14 PROCEDURE — 96374 THER/PROPH/DIAG INJ IV PUSH: CPT

## 2024-08-14 PROCEDURE — 83690 ASSAY OF LIPASE: CPT

## 2024-08-14 PROCEDURE — 700117 HCHG RX CONTRAST REV CODE 255: Performed by: EMERGENCY MEDICINE

## 2024-08-14 PROCEDURE — 36415 COLL VENOUS BLD VENIPUNCTURE: CPT

## 2024-08-14 PROCEDURE — 99285 EMERGENCY DEPT VISIT HI MDM: CPT

## 2024-08-14 PROCEDURE — 81001 URINALYSIS AUTO W/SCOPE: CPT

## 2024-08-14 PROCEDURE — 96375 TX/PRO/DX INJ NEW DRUG ADDON: CPT

## 2024-08-14 PROCEDURE — 51702 INSERT TEMP BLADDER CATH: CPT

## 2024-08-14 PROCEDURE — 700111 HCHG RX REV CODE 636 W/ 250 OVERRIDE (IP): Mod: JZ | Performed by: EMERGENCY MEDICINE

## 2024-08-14 PROCEDURE — 51798 US URINE CAPACITY MEASURE: CPT

## 2024-08-14 PROCEDURE — 85025 COMPLETE CBC W/AUTO DIFF WBC: CPT

## 2024-08-14 PROCEDURE — 74177 CT ABD & PELVIS W/CONTRAST: CPT

## 2024-08-14 PROCEDURE — 80053 COMPREHEN METABOLIC PANEL: CPT

## 2024-08-14 RX ORDER — FAMOTIDINE 20 MG/1
20 TABLET, FILM COATED ORAL 2 TIMES DAILY
COMMUNITY

## 2024-08-14 RX ORDER — ONDANSETRON 2 MG/ML
4 INJECTION INTRAMUSCULAR; INTRAVENOUS ONCE
Status: COMPLETED | OUTPATIENT
Start: 2024-08-14 | End: 2024-08-14

## 2024-08-14 RX ORDER — CEPHALEXIN 500 MG/1
500 CAPSULE ORAL 2 TIMES DAILY
Qty: 10 CAPSULE | Refills: 0 | Status: ACTIVE | OUTPATIENT
Start: 2024-08-14 | End: 2024-08-19

## 2024-08-14 RX ORDER — CEFTRIAXONE 2 G/1
2000 INJECTION, POWDER, FOR SOLUTION INTRAMUSCULAR; INTRAVENOUS ONCE
Status: COMPLETED | OUTPATIENT
Start: 2024-08-14 | End: 2024-08-14

## 2024-08-14 RX ORDER — MORPHINE SULFATE 4 MG/ML
2 INJECTION INTRAVENOUS ONCE
Status: COMPLETED | OUTPATIENT
Start: 2024-08-14 | End: 2024-08-14

## 2024-08-14 RX ADMIN — ONDANSETRON 4 MG: 2 INJECTION INTRAMUSCULAR; INTRAVENOUS at 17:41

## 2024-08-14 RX ADMIN — CEFTRIAXONE SODIUM 2000 MG: 2 INJECTION, POWDER, FOR SOLUTION INTRAMUSCULAR; INTRAVENOUS at 17:41

## 2024-08-14 RX ADMIN — IOHEXOL 100 ML: 350 INJECTION, SOLUTION INTRAVENOUS at 15:38

## 2024-08-14 RX ADMIN — MORPHINE SULFATE 2 MG: 4 INJECTION, SOLUTION INTRAMUSCULAR; INTRAVENOUS at 17:42

## 2024-08-14 ASSESSMENT — PAIN DESCRIPTION - PAIN TYPE: TYPE: ACUTE PAIN

## 2024-08-14 ASSESSMENT — FIBROSIS 4 INDEX: FIB4 SCORE: 1.47

## 2024-08-14 NOTE — ED PROVIDER NOTES
ED Provider Note    CHIEF COMPLAINT  Chief Complaint   Patient presents with    Abdominal Pain     Started having lower abdomen pain   was seen here last week for bowel issues   was constipated and pt was disimpacted    continues to having pain       Nausea     Since getting ill         EXTERNAL RECORDS REVIEWED  Inpatient Notes patient was admitted here at [facility] July 27 through July 30, 2024.  She has history of atrial fibrillation on Eliquis.  She presented with nausea, rectal pain and abdominal pain.  She was noted to have severe constipation with hard stool in rectum.  She was given enema and they tried manual disimpaction by RN on July 28 with severe pain and unable to get anything out.  Case was discussed with gastroenterology who recommended GoLytely and Dulcolax.  Patient had a CT scan done at that time which showed constipation with large volume stool in rectal vault with rectal wall thickening and adjacent fat stranding consistent with fecal impaction with proctocolitis.  She also had fluid-filled prominence of small bowel consistent with ileus versus enteritis.  Primary on July 29 she was transferred to ICU for SVT likely secondary to severe constipation was treated with IV fluids and diltiazem drip with improvement.  She then went into flash pulmonary edema and was placed on high flow nasal cannula.  She was intubated for manual disimpaction by GI as to when safe for sedation with high flow nasal cannula so intubated instead.  She was disimpacted and had a lot of stool output afterward with enemas.  She was then extubated and weaned off the Dilt drip.  She then developed severe bradycardia with a 12-second pause.  She was given atropine and started on Levophed cardiology recommended transfer to [facility] for pacemaker.    Patient was admitted at [facility] from July 30, 2024 through August 4, 2024 for tachybradycardia syndrome.  She was felt to have cardiogenic shock with flash pulmonary  "edema however, bradycardia improved and no pacemaker placement was done during that visit.  She was to discontinue flecainide and go home on a lower dose of metoprolol.  She required continued aggressive bowel regimen after disimpaction and was prescribed MiraLAX, Colace and suppositories for discharge home.      HPI/ROS  LIMITATION TO HISTORY   Select: Altered mental status / Confusion.  Severe dementia.  OUTSIDE HISTORIAN(S):  Significant other  provides history as noted below.    Gloria Herndon is a 83 y.o. female who presents to the ER with complaint of persistent abdominal pain and rectal pain since being sent home from the hospital on August 4, 2024.  Patient is here with her  who provides history as patient has severe dementia.  He says she has a hard time expressing her concerns and cannot articulate where her pain is located although he feels that it has consistently been somewhere in her abdomen or rectum.  He says it occurs mostly at nighttime.  Since discharge from hospital she has been having approximately 5 soft stools a day (small quantity) without blood.  She has had nausea but no vomiting.  No fevers or chills.  She is urinating.  She is eating but \"like a bird.\"  No chest pain.  Currently patient denies abdominal pain.  She denies rectal pain.  No cough, runny nose or sore throat.    PAST MEDICAL HISTORY   has a past medical history of Arrhythmia, Asthma, Atrial fibrillation (HCC), High cholesterol, History of cardiac radiofrequency ablation (12/01/2017), and TIA (transient ischemic attack) (10/2020).    SURGICAL HISTORY   has a past surgical history that includes other cardiac surgery; cholecystectomy (08/08/2003); and other (Bilateral).    FAMILY HISTORY  No family history on file.    SOCIAL HISTORY  Social History     Tobacco Use    Smoking status: Never    Smokeless tobacco: Never   Vaping Use    Vaping status: Never Used   Substance and Sexual Activity    Alcohol use: Not " "Currently     Comment: very rare glass of wine    Drug use: Not Currently    Sexual activity: Not on file       CURRENT MEDICATIONS  Home Medications       Reviewed by Tali Cyr (Pharmacy Tech) on 08/14/24 at 1407  Med List Status: Complete     Medication Last Dose Status   acyclovir (ZOVIRAX) 400 MG tablet 8/13/2024 Active   apixaban (ELIQUIS) 2.5mg Tab 8/14/2024 Active   bisacodyl (DULCOLAX) 10 MG Suppos Not Started Active   docusate sodium (COLACE) 100 MG Cap 8/14/2024 Active   Evolocumab (REPATHA) 140 MG/ML Solution Auto-injector SubQ injection pen three weeks ago Active   famotidine (PEPCID) 20 MG Tab 8/14/2024 Active   hydrocortisone 2.5 % Ointment few days ago Active   MELATONIN PO 8/13/2024 Active   metoprolol SR (TOPROL XL) 25 MG TABLET SR 24 HR 8/14/2024 Active   Polyethylene Glycol 3350 (PEG 3350) 17 GM/SCOOP Powder 8/14/2024 Active   traZODone (DESYREL) 50 MG Tab Not Taking Active                  Audit from Redirected Encounters    **Home medications have not yet been reviewed for this encounter**         ALLERGIES  Allergies   Allergen Reactions    Blue Dyes Vomiting    Green Dyes Vomiting    Iodine Hives    Iodine Solution [Povidone Iodine] Unspecified     Red spots, pain    Omeprazole Unspecified     Urinary incontinence     Tape Unspecified     Welts on skin, paper tape is ok       PHYSICAL EXAM  VITAL SIGNS: /57   Pulse 70   Temp 36.9 °C (98.4 °F) (Temporal)   Resp 20   Ht 1.702 m (5' 7\")   Wt 59.6 kg (131 lb 6.3 oz)   LMP  (LMP Unknown)   SpO2 93%   BMI 20.58 kg/m²    Constitutional:  Well developed, well nourished; No acute distress.  Smell of old stool in room.    HENT: Normocephalic, Atraumatic, Bilateral external ears normal, slightly dry mucous membranes.  Eyes: PERRL, EOMI, Conjunctiva normal, No discharge.   Neck: Normal range of motion, supple, nontender  Lymphatic: No lymphadenopathy noted.   Cardiovascular: Normal heart rate, Normal rhythm, No murmurs, rubs or " gallops   Thorax & Lungs: CTA=bilaterally;  No respiratory distress,  No wheezing rales, or rhonchi; No chest tenderness. No crepitus or subQ air  Abdomen: soft, good bowel sounds, no guarding no rebound, no masses, no pulsatile mass, no tenderness to percussion or palpation, no distention  Skin: Warm, Dry, No erythema, No rash.   Back: No tenderness, No CVA tenderness.   Extremities: 2+ dp and pt pulses bilateral LEs;  Nontender; no pretibial edema  Neurologic: Alert & oriented x 4, clear speech,   Psychiatric: appropriate, normal affect     EKG/LABS  Results for orders placed or performed during the hospital encounter of 08/14/24   CBC WITH DIFFERENTIAL   Result Value Ref Range    WBC 5.2 4.8 - 10.8 K/uL    RBC 4.65 4.20 - 5.40 M/uL    Hemoglobin 14.5 12.0 - 16.0 g/dL    Hematocrit 43.5 37.0 - 47.0 %    MCV 93.5 81.4 - 97.8 fL    MCH 31.2 27.0 - 33.0 pg    MCHC 33.3 32.2 - 35.5 g/dL    RDW 46.0 35.9 - 50.0 fL    Platelet Count 496 (H) 164 - 446 K/uL    MPV 9.9 9.0 - 12.9 fL    Neutrophils-Polys 60.10 44.00 - 72.00 %    Lymphocytes 30.40 22.00 - 41.00 %    Monocytes 7.50 0.00 - 13.40 %    Eosinophils 0.80 0.00 - 6.90 %    Basophils 1.00 0.00 - 1.80 %    Immature Granulocytes 0.20 0.00 - 0.90 %    Nucleated RBC 0.00 0.00 - 0.20 /100 WBC    Neutrophils (Absolute) 3.13 1.82 - 7.42 K/uL    Lymphs (Absolute) 1.58 1.00 - 4.80 K/uL    Monos (Absolute) 0.39 0.00 - 0.85 K/uL    Eos (Absolute) 0.04 0.00 - 0.51 K/uL    Baso (Absolute) 0.05 0.00 - 0.12 K/uL    Immature Granulocytes (abs) 0.01 0.00 - 0.11 K/uL    NRBC (Absolute) 0.00 K/uL   COMP METABOLIC PANEL   Result Value Ref Range    Sodium 139 135 - 145 mmol/L    Potassium 4.8 3.6 - 5.5 mmol/L    Chloride 101 96 - 112 mmol/L    Co2 26 20 - 33 mmol/L    Anion Gap 12.0 7.0 - 16.0    Glucose 114 (H) 65 - 99 mg/dL    Bun 8 8 - 22 mg/dL    Creatinine 0.73 0.50 - 1.40 mg/dL    Calcium 10.2 8.4 - 10.2 mg/dL    Correct Calcium 10.1 8.5 - 10.5 mg/dL    AST(SGOT) 28 12 - 45 U/L     ALT(SGPT) 19 2 - 50 U/L    Alkaline Phosphatase 109 (H) 30 - 99 U/L    Total Bilirubin 0.4 0.1 - 1.5 mg/dL    Albumin 4.1 3.2 - 4.9 g/dL    Total Protein 7.4 6.0 - 8.2 g/dL    Globulin 3.3 1.9 - 3.5 g/dL    A-G Ratio 1.2 g/dL   LIPASE   Result Value Ref Range    Lipase 47 11 - 82 U/L   URINALYSIS (UA)    Specimen: Urine   Result Value Ref Range    Color Yellow     Character Cloudy (A)     Specific Gravity 1.010 <1.035    Ph 8.0 5.0 - 8.0    Glucose Negative Negative mg/dL    Ketones Negative Negative mg/dL    Protein Negative Negative mg/dL    Bilirubin Negative Negative    Nitrite Positive (A) Negative    Leukocyte Esterase Moderate (A) Negative    Occult Blood Trace (A) Negative    Micro Urine Req Microscopic    ESTIMATED GFR   Result Value Ref Range    GFR (CKD-EPI) 81 >60 mL/min/1.73 m 2   URINE MICROSCOPIC (W/UA)   Result Value Ref Range    WBC Packed (A) /hpf    RBC 2-5 (A) /hpf    Bacteria Many (A) None /hpf    Epithelial Cells Rare Few /hpf    Hyaline Cast 0-2 /lpf          RADIOLOGY/PROCEDURES   I have independently interpreted the diagnostic imaging associated with this visit and am waiting the final reading from the radiologist.     My preliminary interpretation is as follows: ER MD is reviewed the patient's CT scan.  No obvious constipation or fecal impaction.  The bladder looks distended with a thickened bladder wall.    Radiologist interpretation:  CT-ABDOMEN-PELVIS WITH   Final Result         1. Diffuse wall thickening of the urinary bladder could relate to cystitis.      2. No significant amount of stool in the colon. Mild wall thickening of the rectum could relate to proctitis as well.          COURSE & MEDICAL DECISION MAKING    ASSESSMENT, COURSE AND PLAN  Care Narrative: Patient presents to the ER with lower abdominal pain and rectal pain which has been going on since she was discharged from the hospital 10 days ago.  Has been says the pain mostly occurs at nighttime.  The patient has dementia  and has a difficult time relating any relevant history.  The  says that at night she indicates that her lower abdomen and rectum are hurting her.  She had a fairly prolonged hospitalization several weeks ago for fecal impaction requiring intubation to disimpact her.  She ended up in SVT and then flash pulmonary edema and then cardiogenic shock.  She was thought to have tachybradycardia syndrome and she was transferred to [facility].  Ultimately she did not need AICD.  Patient was discharged on bowel regimen after she underwent disimpaction and an aggressive constipation regimen.   was concerned that her fecal impaction or constipation had returned  Despite using the Colace and MiraLAX as prescribed.  The patient denies having any pain at this time.  She says she feels fine.   says she has had some nausea and decreased appetite.  He says she is up to the bathroom every 2 hours to urinate.  He thought it was because she was trying to have a bowel movement.  However, given her cloudy infected urine, I suspect that the patient has been up to the bathroom every 2 hours because of the frequency and urgency that she is having from a urinary tract infection.  CT scan shows thickened bladder wall which goes along with cystitis.  There is no evidence of recurrent constipation or fecal impaction.  At this time I think the patient's lower abdominal pain that she has been complaining of and the fact that she has had to run to the bathroom every 2 hours is because she has had a urinary tract infection.  No fevers or chills.  She is afebrile here in the ER.  Her white count is normal.  She has no CVA tenderness.  She has no abdominal tenderness on examination.  She does not appear to be septic or toxic.  CT scan does not reveal any evidence of hydronephrosis or ureteral stone.  No other dangerous intra-abdominal pathology at this time.  She has been resting comfortably throughout her ED stay.  We had the  patient urinate.  She was able to get out of 100 cc of urine.  Bladder scan revealed 233 cc residual.  I ordered Flores catheter.  It took the nurses 3 attempts to get the Flores in due to the patient fighting them, but once it was and really had 150 cc out.  This was about an hour after the bladder scan.  I contemplated leaving the catheter in, but given patient's dementia and Alzheimer's and the fact that she was pulling at her lines when she was here in the hospital a couple weeks ago, I think the risks of keeping the catheter in outweigh the benefit of a possible mild urinary retention 150 cc of urine.  Patient may not be fully voiding because it hurts when she urinates.  This may be why she only got 100 cc out.  I certainly do not want to have the patient excellently pulled her catheter out and cause urethral injury, especially since she is on Eliquis.  At this time I think it is best to pull the catheter, let her go home without a catheter, treat the infection, and have her follow-up with urology in the next week to address her thickened bladder wall, and reevaluate for the possibility of very mild urinary retention.   is amenable with this plan.  Upon discharge patient is well-appearing.  She is awake alert.  Vitals are stable.   has been given very strict return precautions and discharge instructions and he understands treatment plan and follow-up.         1625: Patient urinated 100 cc in the bathroom.  Bladder scan postvoid was 223.    We were finally able to get a Flores catheter into the patient after 3 separate attempts.  This was about an hour after her    ADDITIONAL PROBLEMS MANAGED  Problem #1: Persistent abdominal pain and rectal pain, particularly at nighttime, since discharge from the hospital 10 days ago. Postvoid bladder scan.  Placement of Flores catheter reveals 150 cc of cloudy urine out after Flores was placed.    DISPOSITION AND DISCUSSIONS  I have discussed management of the  patient with the following physicians and DARREN's: None    Discussion of management with other Q or appropriate source(s): None     Escalation of care considered, and ultimately not performed:acute inpatient care management, however at this time, the patient is most appropriate for outpatient management.  CT scan does not reveal any acute pathology other than a thickened bladder wall.  Patient's labs are unremarkable other than a positive urinalysis.  White count is normal.  No left shift.  Her vitals are normal and stable.  She does not appear to be septic or toxic.  Her abdomen is soft and nontender.  No need for admission at this time as patient is not septic or toxic, and importantly she has not had any evidence of tacky bradycardia syndrome here in the ER nor does she meet admission for recurrent bowel disimpaction.    Barriers to care at this time, including but not limited to:  Patient has dementia and is a poor historian .     Decision tools and prescription drugs considered including, but not limited to: Antibiotics patient was given Rocephin and she will go home with Keflex. .    FINAL DIAGNOSIS  1. Acute UTI Acute   2. Bladder wall thickening           This dictation has been created using voice recognition software. The accuracy of the dictation is limited by the abilities of the software. I expect there may be some errors of grammar and possibly content. I made every attempt to manually correct the errors within my dictation. However, errors related to voice recognition software may still exist and should be interpreted within the appropriate context.   Electronically signed by: Kerrie Rhodes M.D., 8/14/2024 1:32 PM

## 2024-08-14 NOTE — ED NOTES
Pharmacy Medication Reconciliation      ~Medication reconciliation updated and complete per patient family at bedside  ~Allergies have been verified and updated   ~No oral ABX within the last 30 days  ~Patient home pharmacy :  [facility] [phone]      ~Anticoagulants (rivaroxaban, apixaban, edoxaban, dabigatran, warfarin, enoxaparin) taken in the last 14 days? Yes  ~Anticoagulant: Eliquis 2.5mg, Last dose: 8/14/24 @0830

## 2024-08-14 NOTE — ED TRIAGE NOTES
Pt comes in w/    recent hospitalization for same complaints   abdomen pain that has since continued after being treated for constipation impaction and N/V   pain 9/10 w/ nausea

## 2024-08-14 NOTE — ED NOTES
Patient up to bathroom to void 100 ml urine, bladder scanned 223 ml post void residual. Urine collected, sent to lab.  Dr. Rhodes updated.

## 2024-08-15 NOTE — ED NOTES
Patient given discharge instructions. PIV and holden catheter discontinued. Rx given for keflex. Patient instructed to follow up with PCP. Patient provided education to come to ER if symptoms worsen. Discharged in stable condition with , taken by wheelchair with ED tech.

## 2024-08-15 NOTE — DISCHARGE INSTRUCTIONS
Follow-up with Dr. Johnson, urologist, this coming week.  Please call tomorrow for appointment.    Encourage patient to drink plenty of water to stay well-hydrated.    Please follow-up with your primary care physician within the next 1 to 2 days.  Call tomorrow to schedule an appointment.    Return to the ER for any worsening lower abdominal discomfort, increased pain with urination, blood in urine, increased cloudiness or foul smell to the urine, back pain, fevers over 100.4, shaking chills, nausea, vomiting, behavioral changes, lethargy, dizziness, or for any concerns.

## 2024-08-15 NOTE — ED NOTES
Flores catheter inserted per MD order. Urine draining to collection bag. Patient and patient's  updated on plan of care.

## 2024-08-18 ENCOUNTER — TELEPHONE (OUTPATIENT)
Dept: CARDIOLOGY | Facility: MEDICAL CENTER | Age: 84
End: 2024-08-18
Payer: MEDICARE

## 2024-08-19 ENCOUNTER — TELEPHONE (OUTPATIENT)
Dept: CARDIOLOGY | Facility: MEDICAL CENTER | Age: 84
End: 2024-08-19
Payer: MEDICARE

## 2024-08-19 DIAGNOSIS — I48.11 LONGSTANDING PERSISTENT ATRIAL FIBRILLATION (HCC): ICD-10-CM

## 2024-08-19 RX ORDER — FLECAINIDE ACETATE 50 MG/1
50 TABLET ORAL 2 TIMES DAILY
COMMUNITY
End: 2024-08-22 | Stop reason: SDUPTHER

## 2024-08-19 RX ORDER — METOPROLOL SUCCINATE 50 MG/1
50 TABLET, EXTENDED RELEASE ORAL DAILY
Qty: 90 TABLET | Refills: 0
Start: 2024-08-19 | End: 2024-08-22 | Stop reason: SDUPTHER

## 2024-08-19 NOTE — TELEPHONE ENCOUNTER
Please note, she was seen at [facility] with recurrence of her atrial fibrillation/atrial flutter. She was successfully cardioverted. Thank you.  CHRIS

## 2024-08-19 NOTE — TELEPHONE ENCOUNTER
MT  Caller: [place] -      Topic/issue: Patient was in the hospital for a cardioversion. Patient was supposed to receive a call to discuss her medication, the metoprolol and the flecanide. Patient has not received a call yet. Please call back ASAP.    Callback Number: [phone]    Thank you,  Kesha PEREZ

## 2024-08-19 NOTE — TELEPHONE ENCOUNTER
Phone Number Called: [phone]    Call outcome:  S/w patients , [place]    Message: Called to discuss patients concerns and symptoms.   Patients  reporting took patient into [facility], HR was 200. Patient was successfully cardioverted.   Was taking 25mg to 50mg metoprolol.    Has not been taking flec, just as needed.   Patient is feeling okay today.   ER precautions give for inc hr sustaining 140+, sob at rest, syncope, worsening/intolerable symptoms such as lightheadedness, dizziness, sob, weakness etc.     Patients  is wanting recommendations on taking flec daily or how many mg of metoprolol patient should be taking.  Discussed with patients  I will reach out to MT for further recs.   Patients  verbalized understanding.

## 2024-08-19 NOTE — TELEPHONE ENCOUNTER
S/w patients  to discuss MT recommendations of adding flec 50mg BID, keeping metoprolol at 50mg daily.   Patients  verbalized understanding

## 2024-08-22 ENCOUNTER — OFFICE VISIT (OUTPATIENT)
Dept: CARDIOLOGY | Facility: MEDICAL CENTER | Age: 84
End: 2024-08-22
Attending: NURSE PRACTITIONER
Payer: MEDICARE

## 2024-08-22 VITALS
DIASTOLIC BLOOD PRESSURE: 58 MMHG | BODY MASS INDEX: 20.58 KG/M2 | HEART RATE: 57 BPM | RESPIRATION RATE: 16 BRPM | HEIGHT: 67 IN | OXYGEN SATURATION: 96 % | SYSTOLIC BLOOD PRESSURE: 102 MMHG

## 2024-08-22 DIAGNOSIS — I48.11 LONGSTANDING PERSISTENT ATRIAL FIBRILLATION (HCC): ICD-10-CM

## 2024-08-22 DIAGNOSIS — I48.91 ATRIAL FIBRILLATION, UNSPECIFIED TYPE (HCC): ICD-10-CM

## 2024-08-22 PROCEDURE — 99214 OFFICE O/P EST MOD 30 MIN: CPT | Performed by: NURSE PRACTITIONER

## 2024-08-22 PROCEDURE — 3074F SYST BP LT 130 MM HG: CPT | Performed by: NURSE PRACTITIONER

## 2024-08-22 PROCEDURE — 3078F DIAST BP <80 MM HG: CPT | Performed by: NURSE PRACTITIONER

## 2024-08-22 PROCEDURE — 99213 OFFICE O/P EST LOW 20 MIN: CPT | Performed by: NURSE PRACTITIONER

## 2024-08-22 RX ORDER — FLECAINIDE ACETATE 50 MG/1
TABLET ORAL
Qty: 250 TABLET | Refills: 3 | Status: SHIPPED | OUTPATIENT
Start: 2024-08-22

## 2024-08-22 RX ORDER — METOPROLOL SUCCINATE 25 MG/1
TABLET, EXTENDED RELEASE ORAL
Qty: 120 TABLET | Refills: 3 | Status: SHIPPED | OUTPATIENT
Start: 2024-08-22

## 2024-08-22 ASSESSMENT — ENCOUNTER SYMPTOMS
HALLUCINATIONS: 0
DIZZINESS: 0
DEPRESSION: 0
RESPIRATORY NEGATIVE: 1
MUSCULOSKELETAL NEGATIVE: 1
NERVOUS/ANXIOUS: 0
PND: 0
PALPITATIONS: 0
SHORTNESS OF BREATH: 0
GASTROINTESTINAL NEGATIVE: 1
WHEEZING: 0
EYES NEGATIVE: 1
SENSORY CHANGE: 0
CONSTITUTIONAL NEGATIVE: 1
ORTHOPNEA: 0
CLAUDICATION: 0
NEUROLOGICAL NEGATIVE: 1
BRUISES/BLEEDS EASILY: 0
NAUSEA: 0

## 2024-08-22 NOTE — PROGRESS NOTES
Atrial Fibrillation Follow up Note    DOS: 8/22/2024   [id number]  Gloria Herndon    Chief complaint/Reason for consult: longitudinal atrial fibrillation care    HPI: Pt is a 83 y.o. female who presents to the clinic today in follow up for antiarrhythmic and chronic anticoagulation. Patient has a past medical history significant for but not limited to: atrial fibrillation, TIA, dyslipidemia, Alzheimer. Patient recently cardioverted for fourth time. A month ago approximately was in ER for impaction and had vagal response during. Recently (approximately 2 weeks ago) she went to ER for cardioversion due to atrial fibrillation despite therapy that was reduced form everyday to as needed due to bradycardic vagal incident. Can return to 50mg twice daily with additional doses for breakthrough episodes. Also reduce metoprolol to 25mg daily with additional as needed. Blood pressure good today. Tolerating renal dosed Eliquis fine.       Past Medical History:   Diagnosis Date    Arrhythmia     Pt states she has Atrial Fib    Asthma     Atrial fibrillation (HCC)     High cholesterol     History of cardiac radiofrequency ablation 12/01/2017    TIA (transient ischemic attack) 10/2020       Past Surgical History:   Procedure Laterality Date    CHOLECYSTECTOMY  08/08/2003    OTHER Bilateral     Lens 2013    OTHER CARDIAC SURGERY      ablation       Social History     Socioeconomic History    Marital status:      Spouse name: Not on file    Number of children: Not on file    Years of education: Not on file    Highest education level: Not on file   Occupational History    Not on file   Tobacco Use    Smoking status: Never    Smokeless tobacco: Never   Vaping Use    Vaping status: Never Used   Substance and Sexual Activity    Alcohol use: Not Currently     Comment: very rare glass of wine    Drug use: Not Currently    Sexual activity: Not on file   Other Topics Concern    Not on file   Social History Narrative    Not on file      Social Determinants of Health     Financial Resource Strain: Not on file   Food Insecurity: No Food Insecurity (7/27/2024)    Hunger Vital Sign     Worried About Running Out of Food in the Last Year: Never true     Ran Out of Food in the Last Year: Never true   Transportation Needs: No Transportation Needs (8/5/2024)    Received from [employer] (and [place], [place], and [place] prior to 7/1/2021)    OASIS : Transportation     Lack of Transportation (Medical): No     Lack of Transportation (Non-Medical): No     Patient Unable or Declines to Respond: No   Physical Activity: Not on file   Stress: Not on file   Social Connections: Feeling Socially Integrated (8/5/2024)    Received from [facility] (and [place], [place], and [place] prior to 7/1/2021)    OASIS : Social Isolation     Frequency of experiencing loneliness or isolation: Rarely   Intimate Partner Violence: Not At Risk (8/18/2024)    Received from [facility] (and [place], [place], and [place] prior to 7/1/2021)    Intimate Partner Violence      Are you in a relationship with someone who hurts you emotionally and/or physically?: No   Housing Stability: Low Risk  (7/27/2024)    Housing Stability Vital Sign     Unable to Pay for Housing in the Last Year: No     Number of Places Lived in the Last Year: 0     Unstable Housing in the Last Year: No       No family history on file.    Allergies   Allergen Reactions    Blue Dyes Vomiting    Green Dyes Vomiting    Iodine Hives    Iodine Solution [Povidone Iodine] Unspecified     Red spots, pain    Omeprazole Unspecified     Urinary incontinence     Tape Unspecified     Welts on skin, paper tape is ok       Current Outpatient Medications   Medication Sig Dispense Refill    flecainide (TAMBOCOR) 50 MG tablet Take 1 Tablet by mouth 2 times a day. May also take 1 Tablet 3 times a day as needed (for breakthrough episodes). 250  Tablet 3    metoprolol SR (TOPROL XL) 25 MG TABLET SR 24 HR Take 1 Tablet by mouth every day. May also take 1 Tablet 1 time a day as needed (for fast heart over 110 for more than 10 minutes). 120 Tablet 3    famotidine (PEPCID) 20 MG Tab Take 20 mg by mouth 2 times a day.      MELATONIN PO Take 1 Tablet by mouth at bedtime as needed (sleep).      Polyethylene Glycol 3350 (PEG 3350) 17 GM/SCOOP Powder DISSOLVE 17g in liquid and take by mouth 2 times a day as needed (constipation). 340 g 3    docusate sodium (COLACE) 100 MG Cap Take 1 Capsule by mouth 2 times a day as needed for Constipation. 30 Capsule 3    bisacodyl (DULCOLAX) 10 MG Suppos Insert 1 Suppository into the rectum every day. 12 Suppository 3    traZODone (DESYREL) 50 MG Tab Take 1 Tablet by mouth every evening. 30 Tablet 3    hydrocortisone 2.5 % Ointment Apply 1 Application topically at bedtime as needed (sore bottom).      acyclovir (ZOVIRAX) 400 MG tablet Take 400 mg by mouth 3 times a day as needed (Outbreak). Take for 5 days at first sign of cold sore.      apixaban (ELIQUIS) 2.5mg Tab Take 1 Tablet by mouth 2 times a day. 200 Tablet 3    Evolocumab (REPATHA) 140 MG/ML Solution Auto-injector SubQ injection pen Inject 1 mL under the skin every 14 days. 6 mL 3     No current facility-administered medications for this visit.       Vitals:    08/22/24 1543   BP: 102/58   Pulse: (!) 57   Resp: 16   SpO2: 96%         Review of Systems   Constitutional: Negative.  Negative for malaise/fatigue.   HENT: Negative.     Eyes: Negative.    Respiratory: Negative.  Negative for shortness of breath and wheezing.    Cardiovascular:  Negative for chest pain, palpitations, orthopnea, claudication, leg swelling and PND.   Gastrointestinal: Negative.  Negative for nausea.   Genitourinary: Negative.    Musculoskeletal: Negative.    Skin: Negative.    Neurological: Negative.  Negative for dizziness and sensory change.   Endo/Heme/Allergies: Negative.  Does not  "bruise/bleed easily.   Psychiatric/Behavioral:  Negative for depression and hallucinations. The patient is not nervous/anxious.             Physical Exam  Constitutional:       Appearance: Normal appearance.   HENT:      Head: Normocephalic.   Eyes:      Pupils: Pupils are equal, round, and reactive to light.   Neck:      Vascular: No JVD.   Cardiovascular:      Rate and Rhythm: Normal rate and regular rhythm.      Pulses: Normal pulses.      Heart sounds: Normal heart sounds.   Pulmonary:      Effort: Pulmonary effort is normal.      Breath sounds: Normal breath sounds.   Abdominal:      General: Abdomen is flat.      Palpations: Abdomen is soft.   Musculoskeletal:      Cervical back: Normal range of motion.      Right lower leg: No edema.      Left lower leg: No edema.   Skin:     General: Skin is warm and dry.   Neurological:      Mental Status: She is alert and oriented to person, place, and time.   Psychiatric:         Mood and Affect: Mood normal.         Behavior: Behavior normal.          Data:  Lipids:   Lab Results   Component Value Date/Time    CHOLSTRLTOT 179 10/07/2020 05:50 AM    TRIGLYCERIDE 97 07/29/2024 02:30 AM    HDL 81 10/07/2020 05:50 AM    LDL 81 10/07/2020 05:50 AM        BMP:  Lab Results   Component Value Date/Time    SODIUM 140 10/04/2023 1659    POTASSIUM 4.6 10/04/2023 1659    CHLORIDE 104 10/04/2023 1659    CO2 27 10/04/2023 1659    GLUCOSE 112 (H) 10/04/2023 1659    BUN 20 10/04/2023 1659    CREATININE 0.81 10/04/2023 1659    CALCIUM 9.2 10/04/2023 1659    ANION 9.0 10/04/2023 1659       GFR:  Lab Results   Component Value Date/Time    IFAFRICA >60 12/13/2021 1152    IFNOTAFR >60 12/13/2021 1152        TSH:   No results found for: \"TSHULTRASEN\"    MAGNESIUM:  No results found for: \"MAGNESIUM\"     THYROXINE (T4):   No results found for: \"FREEDIR\"     CBC:   Lab Results   Component Value Date/Time    WBC 5.2 08/14/2024 01:34 PM    RBC 4.65 08/14/2024 01:34 PM    HEMOGLOBIN 14.5 08/14/2024 " "01:34 PM    HEMATOCRIT 43.5 08/14/2024 01:34 PM    MCV 93.5 08/14/2024 01:34 PM    MCH 31.2 08/14/2024 01:34 PM    MCHC 33.3 08/14/2024 01:34 PM    RDW 46.0 08/14/2024 01:34 PM    PLATELETCT 496 (H) 08/14/2024 01:34 PM    MPV 9.9 08/14/2024 01:34 PM    NEUTSPOLYS 60.10 08/14/2024 01:34 PM    LYMPHOCYTES 30.40 08/14/2024 01:34 PM    MONOCYTES 7.50 08/14/2024 01:34 PM    EOSINOPHILS 0.80 08/14/2024 01:34 PM    BASOPHILS 1.00 08/14/2024 01:34 PM    IMMGRAN 0.20 08/14/2024 01:34 PM    NRBC 0.00 08/14/2024 01:34 PM    NEUTS 3.13 08/14/2024 01:34 PM    LYMPHS 1.58 08/14/2024 01:34 PM    MONOS 0.39 08/14/2024 01:34 PM    EOS 0.04 08/14/2024 01:34 PM    BASO 0.05 08/14/2024 01:34 PM    IMMGRANAB 0.01 08/14/2024 01:34 PM    NRBCAB 0.00 08/14/2024 01:34 PM        CBC w/o DIFF  Lab Results   Component Value Date/Time    WBC 5.2 08/14/2024 01:34 PM    RBC 4.65 08/14/2024 01:34 PM    HEMOGLOBIN 14.5 08/14/2024 01:34 PM    MCV 93.5 08/14/2024 01:34 PM    MCH 31.2 08/14/2024 01:34 PM    MCHC 33.3 08/14/2024 01:34 PM    RDW 46.0 08/14/2024 01:34 PM    MPV 9.9 08/14/2024 01:34 PM       LIVER:  Lab Results   Component Value Date/Time    ALKPHOSPHAT 109 (H) 08/14/2024 01:34 PM    ASTSGOT 28 08/14/2024 01:34 PM    ALTSGPT 19 08/14/2024 01:34 PM    TBILIRUBIN 0.4 08/14/2024 01:34 PM       BNP:  No results found for: \"BNPBTYPENAT\"    PT/INR:  Lab Results   Component Value Date/Time    PROTHROMBTM 14.3 07/30/2024 03:48 PM    PROTHROMBTM 13.5 07/27/2024 07:33 PM    PROTHROMBTM 15.5 (H) 08/09/2022 11:58 AM    INR 1.10 07/30/2024 03:48 PM    INR 0.98 07/27/2024 07:33 PM    INR 1.24 (H) 08/09/2022 11:58 AM             Impression/Plan:  Longstanding persistent atrial fibrillation (HCC)   - recent cardioversion (#4)   - continue flecainide 50mg twice daily for rhythm control    - can take additional 3 tablets for total of 250 mg in day if necessary for breakthrough episode   - metoprolol 25mg daily for rate control   - additional tablet if " necessary for heart rate over 110 sustained longer than 10 minutes   - tolerating renal dosed Eliquis              A total of 30 minutes of time was spent on day of encounter reviewing medical record, performing history and examination, counseling, ordering medication/test/consults, collaborating with referring service, and documentation.    Anibal Saldivar AGACNP-EP  Cardiac Electrophysiology

## 2024-08-22 NOTE — ASSESSMENT & PLAN NOTE
- recent cardioversion (#4)   - continue flecainide 50mg twice daily for rhythm control    - can take additional 3 tablets for total of 250 mg in day if necessary for breakthrough episode   - metoprolol 25mg daily for rate control   - additional tablet if necessary for heart rate over 110 sustained longer than 10 minutes   - tolerating renal dosed Eliquis

## 2024-09-05 ENCOUNTER — OFFICE VISIT (OUTPATIENT)
Dept: UROLOGY | Facility: MEDICAL CENTER | Age: 84
End: 2024-09-05
Payer: MEDICARE

## 2024-09-05 ENCOUNTER — HOSPITAL ENCOUNTER (OUTPATIENT)
Facility: MEDICAL CENTER | Age: 84
End: 2024-09-05
Attending: STUDENT IN AN ORGANIZED HEALTH CARE EDUCATION/TRAINING PROGRAM
Payer: MEDICARE

## 2024-09-05 VITALS
DIASTOLIC BLOOD PRESSURE: 51 MMHG | SYSTOLIC BLOOD PRESSURE: 107 MMHG | WEIGHT: 131 LBS | OXYGEN SATURATION: 95 % | HEART RATE: 65 BPM | HEIGHT: 67 IN | BODY MASS INDEX: 20.56 KG/M2 | TEMPERATURE: 99 F

## 2024-09-05 DIAGNOSIS — N32.89 BLADDER WALL THICKENING: ICD-10-CM

## 2024-09-05 LAB
APPEARANCE UR: CLEAR
APPEARANCE UR: CLEAR
BACTERIA #/AREA URNS HPF: ABNORMAL /HPF
BILIRUB UR QL STRIP.AUTO: NEGATIVE
BILIRUB UR STRIP-MCNC: NORMAL MG/DL
COLOR UR AUTO: NORMAL
COLOR UR: YELLOW
EPI CELLS #/AREA URNS HPF: ABNORMAL /HPF
GLUCOSE UR STRIP.AUTO-MCNC: NEGATIVE MG/DL
GLUCOSE UR STRIP.AUTO-MCNC: NORMAL MG/DL
HYALINE CASTS #/AREA URNS LPF: ABNORMAL /LPF
KETONES UR STRIP.AUTO-MCNC: NEGATIVE MG/DL
KETONES UR STRIP.AUTO-MCNC: NORMAL MG/DL
LEUKOCYTE ESTERASE UR QL STRIP.AUTO: ABNORMAL
LEUKOCYTE ESTERASE UR QL STRIP.AUTO: NORMAL
MICRO URNS: ABNORMAL
NITRITE UR QL STRIP.AUTO: NEGATIVE
NITRITE UR QL STRIP.AUTO: NORMAL
PH UR STRIP.AUTO: 6 [PH] (ref 5–8)
PH UR STRIP.AUTO: 6 [PH] (ref 5–8)
PROT UR QL STRIP: NEGATIVE MG/DL
PROT UR QL STRIP: NORMAL MG/DL
RBC # URNS HPF: ABNORMAL /HPF
RBC UR QL AUTO: NEGATIVE
RBC UR QL AUTO: NORMAL
SP GR UR STRIP.AUTO: 1.02
SP GR UR STRIP.AUTO: >=1.03
UROBILINOGEN UR STRIP-MCNC: 0.2 MG/DL
UROBILINOGEN UR STRIP.AUTO-MCNC: 0.2 MG/DL
WBC #/AREA URNS HPF: ABNORMAL /HPF

## 2024-09-05 PROCEDURE — 81001 URINALYSIS AUTO W/SCOPE: CPT

## 2024-09-05 PROCEDURE — 81002 URINALYSIS NONAUTO W/O SCOPE: CPT | Performed by: STUDENT IN AN ORGANIZED HEALTH CARE EDUCATION/TRAINING PROGRAM

## 2024-09-05 PROCEDURE — 3078F DIAST BP <80 MM HG: CPT | Performed by: STUDENT IN AN ORGANIZED HEALTH CARE EDUCATION/TRAINING PROGRAM

## 2024-09-05 PROCEDURE — 99203 OFFICE O/P NEW LOW 30 MIN: CPT | Performed by: STUDENT IN AN ORGANIZED HEALTH CARE EDUCATION/TRAINING PROGRAM

## 2024-09-05 PROCEDURE — 3074F SYST BP LT 130 MM HG: CPT | Performed by: STUDENT IN AN ORGANIZED HEALTH CARE EDUCATION/TRAINING PROGRAM

## 2024-09-05 ASSESSMENT — FIBROSIS 4 INDEX: FIB4 SCORE: 1.09

## 2024-09-05 NOTE — PROGRESS NOTES
Subjective  Gloria Herndon is a 83 y.o. female who presents today for evaluation of bladder wall thickening.    She had an episode of abdominal pain with cloudy urine and dysuria in August which was treated with a course of antibiotics. The pain and cloudy urine and dysuria have resolved. At the time CT of the abd/pelvis showed circumferential bladder wall thickening which was a new finding from CT the month prior. She denies abdominal pain or flank pain. No gross hematuria.    No family history on file.    Social History     Socioeconomic History    Marital status:      Spouse name: Not on file    Number of children: Not on file    Years of education: Not on file    Highest education level: Not on file   Occupational History    Not on file   Tobacco Use    Smoking status: Never    Smokeless tobacco: Never   Vaping Use    Vaping status: Never Used   Substance and Sexual Activity    Alcohol use: Not Currently     Comment: very rare glass of wine    Drug use: Not Currently    Sexual activity: Not on file   Other Topics Concern    Not on file   Social History Narrative    Not on file     Social Determinants of Health     Financial Resource Strain: Not on file   Food Insecurity: No Food Insecurity (7/27/2024)    Hunger Vital Sign     Worried About Running Out of Food in the Last Year: Never true     Ran Out of Food in the Last Year: Never true   Transportation Needs: No Transportation Needs (8/30/2024)    Received from [facility] (and [facility] prior to 7/1/2021)    OASIS : Transportation     Lack of Transportation (Medical): No     Lack of Transportation (Non-Medical): No     Patient Unable or Declines to Respond: No   Physical Activity: Not on file   Stress: Not on file   Social Connections: Feeling Socially Integrated (8/30/2024)    Received from [employer] (and [facility] prior to 7/1/2021)    OASIS :  Social Isolation     Frequency of experiencing loneliness or isolation: Rarely   Intimate Partner Violence: Not At Risk (8/18/2024)    Received from [facility] (and [facility], [place], and [place] prior to 7/1/2021)    Intimate Partner Violence      Are you in a relationship with someone who hurts you emotionally and/or physically?: No   Housing Stability: Low Risk  (7/27/2024)    Housing Stability Vital Sign     Unable to Pay for Housing in the Last Year: No     Number of Places Lived in the Last Year: 0     Unstable Housing in the Last Year: No       Past Surgical History:   Procedure Laterality Date    CHOLECYSTECTOMY  08/08/2003    OTHER Bilateral     Lens 2013    OTHER CARDIAC SURGERY      ablation       Past Medical History:   Diagnosis Date    Arrhythmia     Pt states she has Atrial Fib    Asthma     Atrial fibrillation (HCC)     High cholesterol     History of cardiac radiofrequency ablation 12/01/2017    TIA (transient ischemic attack) 10/2020       Current Outpatient Medications   Medication Sig Dispense Refill    flecainide (TAMBOCOR) 50 MG tablet Take 1 Tablet by mouth 2 times a day. May also take 1 Tablet 3 times a day as needed (for breakthrough episodes). 250 Tablet 3    metoprolol SR (TOPROL XL) 25 MG TABLET SR 24 HR Take 1 Tablet by mouth every day. May also take 1 Tablet 1 time a day as needed (for fast heart over 110 for more than 10 minutes). 120 Tablet 3    famotidine (PEPCID) 20 MG Tab Take 20 mg by mouth 2 times a day.      MELATONIN PO Take 1 Tablet by mouth at bedtime as needed (sleep).      Polyethylene Glycol 3350 (PEG 3350) 17 GM/SCOOP Powder DISSOLVE 17g in liquid and take by mouth 2 times a day as needed (constipation). 340 g 3    docusate sodium (COLACE) 100 MG Cap Take 1 Capsule by mouth 2 times a day as needed for Constipation. 30 Capsule 3    bisacodyl (DULCOLAX) 10 MG Suppos Insert 1 Suppository into the rectum every day. 12 Suppository 3    traZODone  "(DESYREL) 50 MG Tab Take 1 Tablet by mouth every evening. 30 Tablet 3    hydrocortisone 2.5 % Ointment Apply 1 Application topically at bedtime as needed (sore bottom).      acyclovir (ZOVIRAX) 400 MG tablet Take 400 mg by mouth 3 times a day as needed (Outbreak). Take for 5 days at first sign of cold sore.      apixaban (ELIQUIS) 2.5mg Tab Take 1 Tablet by mouth 2 times a day. 200 Tablet 3    Evolocumab (REPATHA) 140 MG/ML Solution Auto-injector SubQ injection pen Inject 1 mL under the skin every 14 days. 6 mL 3     No current facility-administered medications for this visit.       Allergies   Allergen Reactions    Blue Dyes Vomiting    Green Dyes Vomiting    Iodine Hives    Iodine Solution [Povidone Iodine] Unspecified     Red spots, pain    Omeprazole Unspecified     Urinary incontinence     Tape Unspecified     Welts on skin, paper tape is ok       Objective  /51 (BP Location: Left arm, Patient Position: Sitting, BP Cuff Size: Adult)   Pulse 65   Temp 37.2 °C (99 °F) (Temporal)   Ht 1.702 m (5' 7\")   Wt 59.4 kg (131 lb)   SpO2 95%   Physical Exam  Constitutional:       Appearance: Normal appearance.   HENT:      Head: Normocephalic and atraumatic.   Pulmonary:      Effort: Pulmonary effort is normal.   Abdominal:      General: Abdomen is flat. There is no distension.      Palpations: Abdomen is soft.      Tenderness: There is no abdominal tenderness. There is no right CVA tenderness or left CVA tenderness.   Skin:     General: Skin is warm and dry.   Neurological:      General: No focal deficit present.      Mental Status: She is alert.   Psychiatric:         Mood and Affect: Mood normal.         Behavior: Behavior normal.         Labs:   POCT UA   Lab Results   Component Value Date/Time    POCCOLOR dark yellow 09/05/2024 02:25 AM    POCAPPEAR clear 09/05/2024 02:25 AM    POCLEUKEST small 09/05/2024 02:25 AM    POCNITRITE neg 09/05/2024 02:25 AM    POCUROBILIGE 0.2 09/05/2024 02:25 AM    POCPROTEIN " neg 2024 02:25 AM    POCURPH 6.0 2024 02:25 AM    POCBLOOD neg 2024 02:25 AM    POCSPGRV >=1.030 2024 02:25 AM    POCKETONES neg 2024 02:25 AM    POCBILIRUBIN neg 2024 02:25 AM    POCGLUCUA neg 2024 02:25 AM          Imagin2024 3:11 PM     HISTORY/REASON FOR EXAM:  hx proctocolitis with fecal impaction --persistent pain.        TECHNIQUE/EXAM DESCRIPTION:   CT scan of the abdomen and pelvis with contrast.     Contrast-enhanced helical scanning was obtained from the diaphragmatic domes through the pubic symphysis following the bolus administration of nonionic contrast without complication.     100 mL of Omnipaque 350 nonionic contrast was administered without complication.     Low dose optimization technique was utilized for this CT exam including automated exposure control and adjustment of the mA and/or kV according to patient size.     COMPARISON: 2024.     FINDINGS:  Lower Chest: Patchy linear opacities.     Liver: Normal.     Spleen: Unremarkable.     Pancreas: Unremarkable.     Gallbladder: The gallbladder has been resected.     Biliary: There is no biliary dilatation.     Adrenal glands: Normal.     Kidneys: No hydronephrosis. Bilateral kidneys are enhancing symmetrically..     Bowel: Mild wall thickening of the rectum as well.     Lymph nodes: No adenopathy.     Vasculature: The abdominal aorta is normal in caliber.     Moderate atherosclerotic disease of the abdominal aorta and its branches.     Peritoneum: Unremarkable without ascites.     Musculoskeletal: No aggressive bone lesions are seen.     Pelvis: Diffuse wall thickening of the urinary bladder.     There is a 4.1 cm left ovarian cyst, similar to prior     IMPRESSION:        1. Diffuse wall thickening of the urinary bladder could relate to cystitis.     2. No significant amount of stool in the colon. Mild wall thickening of the rectum could relate to proctitis as well.    Assessment    .  Yanick is an 83 year old woman with circumferential bladder wall thickening on recent CT abd/pelvis from 8/14/2024. The images were personally reviewed and discussed with the patient. This is a new finding compared to CT from 7/2024. This most likely represents cystitis. Urinalysis at the time was positive for infection and showed some red blood cells. I recommend repeat urinalysis with microscopy to evaluate for continued microscopic hematuria. If present I would recommend proceeding with cystoscopy to complete the evaluation. The patient would like to avoid intervention or further investigation if at all possible. Given that this is likely a benign finding that is reasonable.     Plan    Problem List Items Addressed This Visit    None  Visit Diagnoses       Bladder wall thickening        Relevant Orders    POCT Urinalysis (Completed)    URINE MICROSCOPIC (MICROSCOPIC URINALYSIS)    URINALYSIS          Repeat urinalysis, will call with results

## 2024-09-20 ENCOUNTER — TELEPHONE (OUTPATIENT)
Dept: CARDIOLOGY | Facility: MEDICAL CENTER | Age: 84
End: 2024-09-20
Payer: MEDICARE

## 2024-09-20 NOTE — TELEPHONE ENCOUNTER
Phone Number Called: [phone]    Call outcome:  s/w patients , renayyvonne    Message: Called to discuss follow up concerns. Patients  reported patient was back in the ER last night with afib RVR. HR was 150. Patients  wanted clarification with dosage of flec and taking morning dose of flec.  Patients  also express concern regarding upping her dose of flec and metoprolol again due to having to go into the ER so many times.   Patient was previous on 100mg of metoprolol daily and 200mg flec daily.     Patients  also wanted to get a follow up visit scheduled to discuss medication regimen.   Patient scheduled for 10/8/24 with EA.   Patients  verbalized understanding.

## 2024-09-20 NOTE — TELEPHONE ENCOUNTER
MT    Caller: Garrett -     Topic/issue: Patient was admitted to the ER last night around 9:30 PM. Patients  is calling to ask if patient still needs to be taking the flecainide (TAMBOCOR) 50 MG tablet or continue with the 150 that was given at the ER. No other symptoms. Heart Rate high 150's.       Callback Number: [phone]    Thank you,  Sonya MOORE

## 2024-10-08 ENCOUNTER — APPOINTMENT (RX ONLY)
Dept: URBAN - METROPOLITAN AREA CLINIC 38 | Facility: CLINIC | Age: 84
Setting detail: DERMATOLOGY
End: 2024-10-08

## 2024-10-08 ENCOUNTER — OFFICE VISIT (OUTPATIENT)
Dept: CARDIOLOGY | Facility: MEDICAL CENTER | Age: 84
End: 2024-10-08
Attending: NURSE PRACTITIONER
Payer: MEDICARE

## 2024-10-08 VITALS
DIASTOLIC BLOOD PRESSURE: 68 MMHG | BODY MASS INDEX: 20.25 KG/M2 | RESPIRATION RATE: 16 BRPM | WEIGHT: 129 LBS | OXYGEN SATURATION: 95 % | SYSTOLIC BLOOD PRESSURE: 98 MMHG | HEART RATE: 63 BPM | HEIGHT: 67 IN

## 2024-10-08 DIAGNOSIS — D68.318 CIRCULATING ANTICOAGULANTS (HCC): ICD-10-CM

## 2024-10-08 DIAGNOSIS — I48.0 PAROXYSMAL ATRIAL FIBRILLATION (HCC): ICD-10-CM

## 2024-10-08 DIAGNOSIS — Z79.899 ENCOUNTER FOR MONITORING FLECAINIDE THERAPY: ICD-10-CM

## 2024-10-08 DIAGNOSIS — E78.5 DYSLIPIDEMIA: ICD-10-CM

## 2024-10-08 DIAGNOSIS — Z51.81 ENCOUNTER FOR MONITORING FLECAINIDE THERAPY: ICD-10-CM

## 2024-10-08 DIAGNOSIS — Z98.890 H/O CARDIAC RADIOFREQUENCY ABLATION: ICD-10-CM

## 2024-10-08 DIAGNOSIS — I47.10 SVT (SUPRAVENTRICULAR TACHYCARDIA) (HCC): ICD-10-CM

## 2024-10-08 DIAGNOSIS — L85.3 XEROSIS CUTIS: ICD-10-CM

## 2024-10-08 DIAGNOSIS — L57.8 OTHER SKIN CHANGES DUE TO CHRONIC EXPOSURE TO NONIONIZING RADIATION: ICD-10-CM

## 2024-10-08 PROBLEM — D23.72 OTHER BENIGN NEOPLASM OF SKIN OF LEFT LOWER LIMB, INCLUDING HIP: Status: ACTIVE | Noted: 2024-10-08

## 2024-10-08 LAB — EKG IMPRESSION: NORMAL

## 2024-10-08 PROCEDURE — 93005 ELECTROCARDIOGRAM TRACING: CPT | Performed by: NURSE PRACTITIONER

## 2024-10-08 PROCEDURE — 3078F DIAST BP <80 MM HG: CPT | Performed by: NURSE PRACTITIONER

## 2024-10-08 PROCEDURE — 99213 OFFICE O/P EST LOW 20 MIN: CPT

## 2024-10-08 PROCEDURE — 3074F SYST BP LT 130 MM HG: CPT | Performed by: NURSE PRACTITIONER

## 2024-10-08 PROCEDURE — 99213 OFFICE O/P EST LOW 20 MIN: CPT | Performed by: NURSE PRACTITIONER

## 2024-10-08 PROCEDURE — ? COUNSELING

## 2024-10-08 PROCEDURE — 99214 OFFICE O/P EST MOD 30 MIN: CPT | Performed by: NURSE PRACTITIONER

## 2024-10-08 PROCEDURE — 93010 ELECTROCARDIOGRAM REPORT: CPT | Performed by: INTERNAL MEDICINE

## 2024-10-08 ASSESSMENT — ENCOUNTER SYMPTOMS
SPUTUM PRODUCTION: 0
MEMORY LOSS: 1
VOMITING: 0
ORTHOPNEA: 0
FOCAL WEAKNESS: 0
WEIGHT LOSS: 0
CHILLS: 0
HEMOPTYSIS: 0
SHORTNESS OF BREATH: 0
TINGLING: 0
PALPITATIONS: 0
WHEEZING: 0
FEVER: 0
HEARTBURN: 0
PND: 0
SPEECH CHANGE: 0
DIZZINESS: 0
NAUSEA: 0
SENSORY CHANGE: 0
COUGH: 0
HEADACHES: 0

## 2024-10-08 ASSESSMENT — LOCATION SIMPLE DESCRIPTION DERM
LOCATION SIMPLE: RIGHT CHEEK
LOCATION SIMPLE: LEFT PRETIBIAL REGION

## 2024-10-08 ASSESSMENT — LOCATION DETAILED DESCRIPTION DERM
LOCATION DETAILED: RIGHT MEDIAL MALAR CHEEK
LOCATION DETAILED: LEFT DISTAL PRETIBIAL REGION

## 2024-10-08 ASSESSMENT — LOCATION ZONE DERM
LOCATION ZONE: FACE
LOCATION ZONE: LEG

## 2024-10-08 ASSESSMENT — FIBROSIS 4 INDEX: FIB4 SCORE: 1.1

## 2024-10-10 ENCOUNTER — TELEPHONE (OUTPATIENT)
Dept: CARDIOLOGY | Facility: MEDICAL CENTER | Age: 84
End: 2024-10-10
Payer: MEDICARE

## 2024-10-20 NOTE — PROGRESS NOTES
IV Dc 'd. Discharge instructions provided to patient. Pt verbalizes understanding. Pt states all questions have been answered. Copy of DC provided to patient. Signed copy in chart. 1 prescription sent to pharmacy. Pt states all personal belongings are in possession.  Pt escorted off unit by this RN without incident. Refused w/c.  providing transportation.   The patient is a 37y Male complaining of abdominal pain.

## 2024-11-06 ENCOUNTER — TELEPHONE (OUTPATIENT)
Dept: CARDIOLOGY | Facility: MEDICAL CENTER | Age: 84
End: 2024-11-06

## 2024-11-06 ENCOUNTER — OFFICE VISIT (OUTPATIENT)
Dept: CARDIOLOGY | Facility: MEDICAL CENTER | Age: 84
End: 2024-11-06
Attending: STUDENT IN AN ORGANIZED HEALTH CARE EDUCATION/TRAINING PROGRAM
Payer: MEDICARE

## 2024-11-06 VITALS
BODY MASS INDEX: 20.72 KG/M2 | WEIGHT: 132 LBS | HEIGHT: 67 IN | RESPIRATION RATE: 16 BRPM | HEART RATE: 63 BPM | DIASTOLIC BLOOD PRESSURE: 60 MMHG | OXYGEN SATURATION: 97 % | SYSTOLIC BLOOD PRESSURE: 100 MMHG

## 2024-11-06 DIAGNOSIS — I48.91 ATRIAL FIBRILLATION, UNSPECIFIED TYPE (HCC): ICD-10-CM

## 2024-11-06 PROCEDURE — 3078F DIAST BP <80 MM HG: CPT | Performed by: STUDENT IN AN ORGANIZED HEALTH CARE EDUCATION/TRAINING PROGRAM

## 2024-11-06 PROCEDURE — 99213 OFFICE O/P EST LOW 20 MIN: CPT | Performed by: STUDENT IN AN ORGANIZED HEALTH CARE EDUCATION/TRAINING PROGRAM

## 2024-11-06 PROCEDURE — 99214 OFFICE O/P EST MOD 30 MIN: CPT | Performed by: STUDENT IN AN ORGANIZED HEALTH CARE EDUCATION/TRAINING PROGRAM

## 2024-11-06 PROCEDURE — 3074F SYST BP LT 130 MM HG: CPT | Performed by: STUDENT IN AN ORGANIZED HEALTH CARE EDUCATION/TRAINING PROGRAM

## 2024-11-06 RX ORDER — FLECAINIDE ACETATE 50 MG/1
TABLET ORAL
Qty: 90 TABLET | Refills: 0 | Status: SHIPPED | OUTPATIENT
Start: 2024-11-06

## 2024-11-06 RX ORDER — METOPROLOL TARTRATE 25 MG/1
25 TABLET, FILM COATED ORAL PRN
Qty: 60 TABLET | Refills: 2 | Status: SHIPPED | OUTPATIENT
Start: 2024-11-06

## 2024-11-06 ASSESSMENT — FIBROSIS 4 INDEX: FIB4 SCORE: 1.9

## 2024-11-06 NOTE — TELEPHONE ENCOUNTER
VIKTORIA        Caller: [email or web address] with [facility] pharmacy    Topic/issue: Their office was calling with questions about the directions metoprolol tartrate (LOPRESSOR) 25 MG Tab and how often a day they need to take it. Please advise.        Callback Number: [phone]      Thank you    -Gerry SERRANO

## 2024-11-06 NOTE — TELEPHONE ENCOUNTER
Erwin Fang MD, PhD  You2 minutes ago (3:44 PM)     RY  She has scheduled metoprolol succinate 25 mg daily.  I started metoprolol tartrate 25 mg one tablet as needed for atrial fibrillation spells if heart rate more than 100 bpm.  Thank you       S/w Tristen @ [facility] to discuss metoprolol tartrate instructions. Verbalized understanding.

## 2024-11-06 NOTE — PROGRESS NOTES
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 She follows Dr. Lozada before. She has been on metoprolol and flecainide from 100 mg bid to 50 md bid due to 1st AVB with IN around 230s ms. She had discussion of redo atrial fibrillation ablation with aborted redo AF ablation in 5/2023. She saw EP NP Anibal and Susan recently after multiple ER visits due to AF RVR s/p DCCV.  Her recent echocardiogram showed preserved LVEF, normal size of right atrium/left atrium, significant valvular disease.  Her ECG showed stable and narrow QRS/QTC and stable IN interval around 230s ms. She is accompanied by her  today.    ROS (+ highlighted in red):  General--Negative for fatigue, weight loss or weight gain  Cardiovascular--Negative for CP, orthopnea, PND    Past Medical History:   Diagnosis Date    Arrhythmia     Pt states she has Atrial Fib    Asthma     Atrial fibrillation (HCC)     High cholesterol     History of cardiac radiofrequency ablation 12/01/2017    TIA (transient ischemic attack) 10/2020       Past Surgical History:   Procedure Laterality Date    CHOLECYSTECTOMY  08/08/2003    OTHER Bilateral     Lens 2013    OTHER CARDIAC SURGERY      ablation       Social History     Socioeconomic History    Marital status:      Spouse name: Not on file    Number of children: Not on file    Years of education: Not on file    Highest education level: Not on file   Occupational History    Not on file   Tobacco Use    Smoking status: Never    Smokeless tobacco: Never   Vaping Use    Vaping status: Never Used   Substance and Sexual Activity    Alcohol use: Not Currently      Comment: very rare glass of wine    Drug use: Not Currently    Sexual activity: Not on file   Other Topics Concern    Not on file   Social History Narrative    Not on file     Social Drivers of Health     Financial Resource Strain: Not on file   Food Insecurity: No Food Insecurity (7/27/2024)    Hunger Vital Sign     Worried About Running Out of Food in the Last Year: Never true     Ran Out of Food in the Last Year: Never true   Transportation Needs: No Transportation Needs (8/30/2024)    Received from [employer] (and [employer] [facility] prior to 7/1/2021)    OASIS : Transportation     Lack of Transportation (Medical): No     Lack of Transportation (Non-Medical): No     Patient Unable or Declines to Respond: No   Physical Activity: Not on file   Stress: Not on file   Social Connections: Feeling Socially Integrated (8/30/2024)    Received from [employer] (and [employer] [place] [place] prior to 7/1/2021)    OASIS : Social Isolation     Frequency of experiencing loneliness or isolation: Rarely   Intimate Partner Violence: Not At Risk (10/26/2024)    Received from [employer] (and [employer] [facility] prior to 7/1/2021)    Intimate Partner Violence      Are you in a relationship with someone who hurts you emotionally and/or physically?: No   Housing Stability: Low Risk  (7/27/2024)    Housing Stability Vital Sign     Unable to Pay for Housing in the Last Year: No     Number of Places Lived in the Last Year: 0     Unstable Housing in the Last Year: No       No family history on file.    Allergies   Allergen Reactions    Blue Dyes Vomiting    Green Dyes Vomiting    Iodine Hives    Iodine Solution [Povidone Iodine] Unspecified     Red spots, pain    Omeprazole Unspecified     Urinary incontinence     Tape Unspecified     Welts on skin, paper tape is ok       Current Outpatient Medications   Medication Sig  Dispense Refill    flecainide (TAMBOCOR) 50 MG tablet Take 1 Tablet by mouth every 8 hours. May also take 1 Tablet 3 times a day as needed (for breakthrough episodes, no more than total 300 mg). 90 Tablet 0    metoprolol tartrate (LOPRESSOR) 25 MG Tab Take 1 Tablet by mouth as needed (for heart rate more than 100 bpm). 60 Tablet 2    apixaban (ELIQUIS) 2.5mg Tab Take 1 Tablet by mouth 2 times a day. 200 Tablet 3    metoprolol SR (TOPROL XL) 25 MG TABLET SR 24 HR Take 1 Tablet by mouth every day. May also take 1 Tablet 1 time a day as needed (for fast heart over 110 for more than 10 minutes). 120 Tablet 3    MELATONIN PO Take 1 Tablet by mouth at bedtime as needed (sleep).      Polyethylene Glycol 3350 (PEG 3350) 17 GM/SCOOP Powder DISSOLVE 17g in liquid and take by mouth 2 times a day as needed (constipation). 340 g 3    hydrocortisone 2.5 % Ointment Apply 1 Application topically at bedtime as needed (sore bottom).      acyclovir (ZOVIRAX) 400 MG tablet Take 400 mg by mouth 3 times a day as needed (Outbreak). Take for 5 days at first sign of cold sore.      famotidine (PEPCID) 20 MG Tab Take 20 mg by mouth 2 times a day. (Patient not taking: Reported on 10/8/2024)      docusate sodium (COLACE) 100 MG Cap Take 1 Capsule by mouth 2 times a day as needed for Constipation. (Patient not taking: Reported on 10/8/2024) 30 Capsule 3    bisacodyl (DULCOLAX) 10 MG Suppos Insert 1 Suppository into the rectum every day. (Patient not taking: Reported on 10/8/2024) 12 Suppository 3    traZODone (DESYREL) 50 MG Tab Take 1 Tablet by mouth every evening. (Patient not taking: Reported on 10/8/2024) 30 Tablet 3    Evolocumab (REPATHA) 140 MG/ML Solution Auto-injector SubQ injection pen Inject 1 mL under the skin every 14 days. (Patient not taking: Reported on 10/8/2024) 6 mL 3     No current facility-administered medications for this visit.       Physical Exam:  Vitals:    11/06/24 0953   BP: 100/60   BP Location: Left arm   Patient  "Position: Sitting   BP Cuff Size: Adult   Pulse: 63   Resp: 16   SpO2: 97%   Weight: 59.9 kg (132 lb)   Height: 1.702 m (5' 7\")     General appearance: NAD, conversant  HEENT: PERRL, neck is supple with FROM  Lungs: Clear to auscultation, normal respiratory effort  CV: RRR, no murmurs/rubs/gallops, no JVD  Abdomen: Soft, non-tender with normal bowel sounds  Extremities: No peripheral edema, no clubbing or cyanosis  Skin: No rash, lesions, or ulcers  Psych: Alert and oriented to person, place and time    Data:  Labs reviewed:  No anemia, Cr 0.7    Prior echo reviewed:  Echo: preserved LVEF, normal size of RA/LA, no significant valvular disease    EKG interpreted by me:  SR, , stable QRS/QTC, narrow QRS    Impression/Plan:  1. Atrial fibrillation, unspecified type (HCC)  flecainide (TAMBOCOR) 50 MG tablet    metoprolol tartrate (LOPRESSOR) 25 MG Tab      status post ablation in 2017 at [facility]     2. TIA   3. HTN   4. Memory loss    - I discussed with patient and her  in details the potential etiologies of recurrent atrial fibrillation post ablation, the importance of stroke prevention on anticoagulation, healthy lifestyle modifications.  I went over potential management including continuing flecainide, or other AADs like dronedarone or sotalol/Tikosyn requiring hospital stay for first 5 doses, or redo atrial fibrillation ablation or AV node ablation with permanent pacemaker implantation. They would like to try higher dose flecainide if possible. I increased flecainide to 50mg TID with flecainide 50 mg prn for fast Afib spikes, adding metoprolol to tartrate 25 mg for fast Afib spells.   - I will follow her in 3 months     Erwin Fang MD, PhD  Cardiac Electrophysiologist   "

## 2024-12-16 ENCOUNTER — TELEPHONE (OUTPATIENT)
Dept: CARDIOLOGY | Facility: MEDICAL CENTER | Age: 84
End: 2024-12-16
Payer: MEDICARE

## 2024-12-16 DIAGNOSIS — D68.318 CIRCULATING ANTICOAGULANTS (HCC): ICD-10-CM

## 2024-12-16 DIAGNOSIS — E78.5 DYSLIPIDEMIA: ICD-10-CM

## 2024-12-16 DIAGNOSIS — I48.91 ATRIAL FIBRILLATION, UNSPECIFIED TYPE (HCC): ICD-10-CM

## 2024-12-16 RX ORDER — FLECAINIDE ACETATE 50 MG/1
TABLET ORAL
Qty: 200 TABLET | Refills: 0 | Status: SHIPPED | OUTPATIENT
Start: 2024-12-16

## 2024-12-16 NOTE — TELEPHONE ENCOUNTER
VKITORIA     Received request via: Patient    Was the patient seen in the last year in this department? Yes 11.06.24    Does the patient have an active prescription (recently filled or refills available) for medication(s) requested? No    Pharmacy Name: [facility]     Does the patient have [facility] Plus and need 100-day supply? (This applies to ALL medications) Patient does not have SCP    Thank you,     Joelle PANTOJA

## 2025-02-24 DIAGNOSIS — D68.318 CIRCULATING ANTICOAGULANTS (HCC): ICD-10-CM

## 2025-02-24 DIAGNOSIS — E78.5 DYSLIPIDEMIA: ICD-10-CM

## 2025-02-24 DIAGNOSIS — I48.91 ATRIAL FIBRILLATION, UNSPECIFIED TYPE (HCC): ICD-10-CM

## 2025-02-24 NOTE — TELEPHONE ENCOUNTER
RY      Received request via: Patient    Was the patient seen in the last year in this department? Yes    Does the patient have an active prescription (recently filled or refills available) for medication(s) requested? Yes. Refill request has been refused in [facility]. Contacted pharmacy and called in most recent prescription.    Pharmacy Name: [facility] - [place], [place] - [address] [[id number]    [facility] for the patient's Eliquis medication     Does the patient have [facility] and need 100-day supply? (This applies to ALL medications) Patient does not have SCP    Thank you    -Gerry SERRANO

## 2025-02-25 RX ORDER — FLECAINIDE ACETATE 50 MG/1
TABLET ORAL
Qty: 200 TABLET | Refills: 0 | Status: SHIPPED | OUTPATIENT
Start: 2025-02-25

## 2025-02-28 ENCOUNTER — TELEPHONE (OUTPATIENT)
Dept: CARDIOLOGY | Facility: MEDICAL CENTER | Age: 85
End: 2025-02-28
Payer: MEDICARE

## 2025-02-28 DIAGNOSIS — E78.5 DYSLIPIDEMIA: ICD-10-CM

## 2025-02-28 DIAGNOSIS — I48.91 ATRIAL FIBRILLATION, UNSPECIFIED TYPE (HCC): ICD-10-CM

## 2025-02-28 DIAGNOSIS — D68.318 CIRCULATING ANTICOAGULANTS (HCC): ICD-10-CM

## 2025-02-28 NOTE — TELEPHONE ENCOUNTER
VIKTORIA    Caller: Milan    Topic/issue: Patients  is calling to make sure the [facility]QUIS is being [facility].    Please advise.    Callback Number: [phone]     Thank you,  Sonya MOORE

## 2025-02-28 NOTE — ED NOTES
Patient to CT via gurney.    What Type Of Note Output Would You Prefer (Optional)?: Standard Output How Severe Are Your Spot(S)?: mild Have Your Spot(S) Been Treated In The Past?: has not been treated Hpi Title: Evaluation of Skin Lesions

## 2025-02-28 NOTE — TELEPHONE ENCOUNTER
S/w patient's  Milan and advised that RX has been moved to [facility] and he verbalized understanding.

## 2025-03-03 ENCOUNTER — OFFICE VISIT (OUTPATIENT)
Dept: CARDIOLOGY | Facility: MEDICAL CENTER | Age: 85
End: 2025-03-03
Attending: STUDENT IN AN ORGANIZED HEALTH CARE EDUCATION/TRAINING PROGRAM
Payer: MEDICARE

## 2025-03-03 VITALS
SYSTOLIC BLOOD PRESSURE: 84 MMHG | BODY MASS INDEX: 22.47 KG/M2 | OXYGEN SATURATION: 95 % | HEART RATE: 93 BPM | WEIGHT: 143.2 LBS | HEIGHT: 67 IN | RESPIRATION RATE: 16 BRPM | DIASTOLIC BLOOD PRESSURE: 56 MMHG

## 2025-03-03 DIAGNOSIS — Z98.890 H/O CARDIAC RADIOFREQUENCY ABLATION: ICD-10-CM

## 2025-03-03 DIAGNOSIS — I48.0 PAROXYSMAL ATRIAL FIBRILLATION (HCC): ICD-10-CM

## 2025-03-03 DIAGNOSIS — I47.10 SVT (SUPRAVENTRICULAR TACHYCARDIA) (HCC): ICD-10-CM

## 2025-03-03 PROCEDURE — 3074F SYST BP LT 130 MM HG: CPT | Performed by: STUDENT IN AN ORGANIZED HEALTH CARE EDUCATION/TRAINING PROGRAM

## 2025-03-03 PROCEDURE — 93005 ELECTROCARDIOGRAM TRACING: CPT | Mod: TC | Performed by: STUDENT IN AN ORGANIZED HEALTH CARE EDUCATION/TRAINING PROGRAM

## 2025-03-03 PROCEDURE — 99214 OFFICE O/P EST MOD 30 MIN: CPT | Performed by: STUDENT IN AN ORGANIZED HEALTH CARE EDUCATION/TRAINING PROGRAM

## 2025-03-03 PROCEDURE — 99213 OFFICE O/P EST LOW 20 MIN: CPT | Performed by: STUDENT IN AN ORGANIZED HEALTH CARE EDUCATION/TRAINING PROGRAM

## 2025-03-03 PROCEDURE — 99212 OFFICE O/P EST SF 10 MIN: CPT | Performed by: STUDENT IN AN ORGANIZED HEALTH CARE EDUCATION/TRAINING PROGRAM

## 2025-03-03 PROCEDURE — 3078F DIAST BP <80 MM HG: CPT | Performed by: STUDENT IN AN ORGANIZED HEALTH CARE EDUCATION/TRAINING PROGRAM

## 2025-03-03 ASSESSMENT — FIBROSIS 4 INDEX: FIB4 SCORE: 1.93

## 2025-03-03 NOTE — PROGRESS NOTES
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 She followed Dr. Lozada before. She has been on metoprolol and flecainide from 100 mg bid to 50 md bid due to 1st AVB with DC around 230s ms. She had discussion of redo atrial fibrillation ablation with aborted redo AF ablation in 5/2023. She saw EP NP Anibal and Susan recently after multiple ER visits due to AF RVR s/p DCCV.  Her recent echocardiogram showed preserved LVEF, normal size of right atrium/left atrium, significant valvular disease.  Her ECG showed stable and narrow QRS/QTC and stable DC interval around 230s ms.   We increased flecainide to 50 mg 3 times daily since last visit in November/2024.  She had one episodes of atrial flutter with heart rate 150s bpm and she was hospitalized in the middle of November/2024.  Atrial flutter was converted into sinus rhythm spontaneously after IV diltiazem.  She has no recurrent atrial fibrillation/atrial flutter with RVR since then. She has been doing well and denies dizziness, chest pain, shortness of breath, palpitations, syncope.  She exercises with a  in the gym for one hour each time 3 times a week without cardiac limitations. They don't check home BP usually.  She is accompanied by her  today.    ROS (+ highlighted in red):  General--Negative for fatigue, weight loss or weight gain  Cardiovascular--Negative for dizziness, CP, orthopnea, PND, syncope    Past Medical History:   Diagnosis Date    Arrhythmia     Pt states she has Atrial Fib    Asthma     Atrial fibrillation (HCC)     High cholesterol     History of cardiac radiofrequency ablation 12/01/2017    TIA (transient ischemic attack) 10/2020       Past Surgical History:   Procedure Laterality Date     CHOLECYSTECTOMY  08/08/2003    OTHER Bilateral     Lens 2013    OTHER CARDIAC SURGERY      ablation       Social History     Socioeconomic History    Marital status:      Spouse name: Not on file    Number of children: Not on file    Years of education: Not on file    Highest education level: Not on file   Occupational History    Not on file   Tobacco Use    Smoking status: Never    Smokeless tobacco: Never   Vaping Use    Vaping status: Never Used   Substance and Sexual Activity    Alcohol use: Not Currently     Comment: very rare glass of wine    Drug use: Not Currently    Sexual activity: Not on file   Other Topics Concern    Not on file   Social History Narrative    Not on file     Social Drivers of Health     Financial Resource Strain: Not on file   Food Insecurity: No Food Insecurity (7/27/2024)    Hunger Vital Sign     Worried About Running Out of Food in the Last Year: Never true     Ran Out of Food in the Last Year: Never true   Transportation Needs: No Transportation Needs (8/30/2024)    Received from [employer] (and [employer] [facility] prior to 7/1/2021)    OASIS : Transportation     Lack of Transportation (Medical): No     Lack of Transportation (Non-Medical): No     Patient Unable or Declines to Respond: No   Physical Activity: Not on file   Stress: Not on file   Social Connections: Feeling Socially Integrated (8/30/2024)    Received from [employer] (and [employer] [facility] prior to 7/1/2021)    OASIS : Social Isolation     Frequency of experiencing loneliness or isolation: Rarely   Intimate Partner Violence: Not At Risk (11/14/2024)    Received from [employer] (and [employer] [facility] prior to 7/1/2021)    Feeling Safe      Are you in a relationship with someone who hurts you emotionally and/or physically?: No   Housing Stability: Low Risk  (7/27/2024)    Housing Stability  Vital Sign     Unable to Pay for Housing in the Last Year: No     Number of Places Lived in the Last Year: 0     Unstable Housing in the Last Year: No       No family history on file.    Allergies   Allergen Reactions    Blue Dyes Vomiting    Green Dyes Vomiting    Iodine Hives    Iodine Solution [Povidone Iodine] Unspecified     Red spots, pain    Omeprazole Unspecified     Urinary incontinence     Tape Unspecified     Welts on skin, paper tape is ok       Current Outpatient Medications   Medication Sig Dispense Refill    apixaban (ELIQUIS) 2.5mg Tab Take 1 Tablet by mouth 2 times a day. 180 Tablet 1    flecainide (TAMBOCOR) 50 MG tablet Take 1 Tablet by mouth every 8 hours. May also take 1 Tablet 3 times a day as needed (for breakthrough episodes, no more than total 300 mg). 200 Tablet 0    metoprolol tartrate (LOPRESSOR) 25 MG Tab Take 1 Tablet by mouth as needed (for heart rate more than 100 bpm). 60 Tablet 2    metoprolol SR (TOPROL XL) 25 MG TABLET SR 24 HR Take 1 Tablet by mouth every day. May also take 1 Tablet 1 time a day as needed (for fast heart over 110 for more than 10 minutes). 120 Tablet 3    MELATONIN PO Take 1 Tablet by mouth at bedtime as needed (sleep).      Polyethylene Glycol 3350 (PEG 3350) 17 GM/SCOOP Powder DISSOLVE 17g in liquid and take by mouth 2 times a day as needed (constipation). 340 g 3    hydrocortisone 2.5 % Ointment Apply 1 Application topically at bedtime as needed (sore bottom).      acyclovir (ZOVIRAX) 400 MG tablet Take 400 mg by mouth 3 times a day as needed (Outbreak). Take for 5 days at first sign of cold sore.      famotidine (PEPCID) 20 MG Tab Take 20 mg by mouth 2 times a day. (Patient not taking: Reported on 10/8/2024)      docusate sodium (COLACE) 100 MG Cap Take 1 Capsule by mouth 2 times a day as needed for Constipation. (Patient not taking: Reported on 10/8/2024) 30 Capsule 3    bisacodyl (DULCOLAX) 10 MG Suppos Insert 1 Suppository into the rectum every day.  "(Patient not taking: Reported on 10/8/2024) 12 Suppository 3    traZODone (DESYREL) 50 MG Tab Take 1 Tablet by mouth every evening. (Patient not taking: Reported on 10/8/2024) 30 Tablet 3    Evolocumab (REPATHA) 140 MG/ML Solution Auto-injector SubQ injection pen Inject 1 mL under the skin every 14 days. (Patient not taking: Reported on 10/8/2024) 6 mL 3     No current facility-administered medications for this visit.       Physical Exam:  Vitals:    03/03/25 1003   BP: (!) 84/56   BP Location: Left arm   Patient Position: Sitting   BP Cuff Size: Adult   Pulse: 93   Resp: 16   SpO2: 95%   Weight: 65 kg (143 lb 3.2 oz)   Height: 1.702 m (5' 7\")     General appearance: NAD, conversant  HEENT: PERRL, neck is supple with FROM  Lungs: Clear to auscultation, normal respiratory effort  CV: RRR, no murmurs/rubs/gallops, no JVD  Abdomen: Soft, non-tender with normal bowel sounds  Extremities: No peripheral edema, no clubbing or cyanosis  Skin: No rash, lesions, or ulcers  Psych: Alert and oriented to person, place and time    Data:  Labs reviewed:  No anemia, Cr 0.7    Prior echo reviewed:  Echo: preserved LVEF, normal size of RA/LA, no significant valvular disease    EKG interpreted by me:  SR, , stable QRS/QTC, narrow QRS    Impression/Plan:  1. SVT (supraventricular tachycardia) (HCC)  EKG      status post ablation in 2017 at [facility]     2. TIA   3. HTN   4. Memory loss    - I discussed with patient and her  in details the potential etiologies of recurrent atrial fibrillation post ablation, the importance of stroke prevention on anticoagulation, healthy lifestyle modifications.  She has been doing well on flecainide 50mg TID and flecainide 50 mg prn for fast Afib spikes, and additional metoprolol 25 mg qd for fast Afib spells. They prefer to keep current regimens. I encourage them to check BP at home.   - I will follow her in 6 months     Erwin Fang MD, PhD  Cardiac Electrophysiologist   "

## 2025-03-12 LAB — EKG IMPRESSION: NORMAL

## 2025-03-12 PROCEDURE — 93010 ELECTROCARDIOGRAM REPORT: CPT | Performed by: STUDENT IN AN ORGANIZED HEALTH CARE EDUCATION/TRAINING PROGRAM

## 2025-04-21 ENCOUNTER — APPOINTMENT (OUTPATIENT)
Dept: NEUROLOGY | Facility: MEDICAL CENTER | Age: 85
End: 2025-04-21
Attending: PSYCHIATRY & NEUROLOGY
Payer: MEDICARE

## 2025-05-02 ENCOUNTER — TELEPHONE (OUTPATIENT)
Dept: CARDIOLOGY | Facility: MEDICAL CENTER | Age: 85
End: 2025-05-02
Payer: MEDICARE

## 2025-05-02 DIAGNOSIS — I48.91 ATRIAL FIBRILLATION, UNSPECIFIED TYPE (HCC): ICD-10-CM

## 2025-05-02 RX ORDER — FLECAINIDE ACETATE 50 MG/1
TABLET ORAL
Qty: 200 TABLET | Refills: 1 | Status: SHIPPED | OUTPATIENT
Start: 2025-05-02

## 2025-05-02 NOTE — TELEPHONE ENCOUNTER
RY    Received request via: Patient    Was the patient seen in the last year in this department? Yes    Does the patient have an active prescription (recently filled or refills available) for medication(s) requested? Yes. Refill request has been refused in [facility]. Contacted pharmacy and called in most recent prescription.    Pharmacy Name: [facility] - [place], [place] - [address]  [[id number]]     Does the patient have care home Plus and need 100-day supply? (This applies to ALL medications) Patient does not have SCP     flecainide (TAMBOCOR) 50 MG tablet     Thank you,  Sonya MOORE

## 2025-05-07 ENCOUNTER — APPOINTMENT (OUTPATIENT)
Dept: URBAN - METROPOLITAN AREA CLINIC 38 | Facility: CLINIC | Age: 85
Setting detail: DERMATOLOGY
End: 2025-05-07

## 2025-05-07 DIAGNOSIS — D18.0 HEMANGIOMA: ICD-10-CM

## 2025-05-07 DIAGNOSIS — Z71.89 OTHER SPECIFIED COUNSELING: ICD-10-CM

## 2025-05-07 DIAGNOSIS — L82.0 INFLAMED SEBORRHEIC KERATOSIS: ICD-10-CM

## 2025-05-07 DIAGNOSIS — D22 MELANOCYTIC NEVI: ICD-10-CM

## 2025-05-07 DIAGNOSIS — L81.4 OTHER MELANIN HYPERPIGMENTATION: ICD-10-CM

## 2025-05-07 DIAGNOSIS — L82.1 OTHER SEBORRHEIC KERATOSIS: ICD-10-CM

## 2025-05-07 DIAGNOSIS — L72.3 SEBACEOUS CYST: ICD-10-CM

## 2025-05-07 PROBLEM — D22.9 MELANOCYTIC NEVI, UNSPECIFIED: Status: ACTIVE | Noted: 2025-05-07

## 2025-05-07 PROBLEM — D18.01 HEMANGIOMA OF SKIN AND SUBCUTANEOUS TISSUE: Status: ACTIVE | Noted: 2025-05-07

## 2025-05-07 PROCEDURE — 17110 DESTRUCTION B9 LES UP TO 14: CPT | Mod: 52

## 2025-05-07 PROCEDURE — 99213 OFFICE O/P EST LOW 20 MIN: CPT | Mod: 25

## 2025-05-07 PROCEDURE — ? COUNSELING

## 2025-05-07 PROCEDURE — ? LIQUID NITROGEN

## 2025-05-07 ASSESSMENT — LOCATION ZONE DERM
LOCATION ZONE: FACE
LOCATION ZONE: SCALP
LOCATION ZONE: TRUNK
LOCATION ZONE: ARM
LOCATION ZONE: NECK

## 2025-05-07 ASSESSMENT — LOCATION SIMPLE DESCRIPTION DERM
LOCATION SIMPLE: INFERIOR FOREHEAD
LOCATION SIMPLE: POSTERIOR NECK
LOCATION SIMPLE: RIGHT FOREARM
LOCATION SIMPLE: RIGHT OCCIPITAL SCALP
LOCATION SIMPLE: ABDOMEN
LOCATION SIMPLE: LEFT FOREARM
LOCATION SIMPLE: RIGHT CHEEK
LOCATION SIMPLE: LEFT ANTERIOR NECK
LOCATION SIMPLE: RIGHT FOREHEAD
LOCATION SIMPLE: RIGHT UPPER BACK

## 2025-05-07 ASSESSMENT — LOCATION DETAILED DESCRIPTION DERM
LOCATION DETAILED: RIGHT INFERIOR UPPER BACK
LOCATION DETAILED: LEFT CLAVICULAR NECK
LOCATION DETAILED: PERIUMBILICAL SKIN
LOCATION DETAILED: INFERIOR MID FOREHEAD
LOCATION DETAILED: RIGHT PROXIMAL DORSAL FOREARM
LOCATION DETAILED: LEFT DISTAL DORSAL FOREARM
LOCATION DETAILED: RIGHT MEDIAL TRAPEZIAL NECK
LOCATION DETAILED: RIGHT SUPERIOR OCCIPITAL SCALP
LOCATION DETAILED: RIGHT LATERAL FOREHEAD
LOCATION DETAILED: RIGHT LATERAL MANDIBULAR CHEEK

## 2025-05-07 NOTE — PROCEDURE: LIQUID NITROGEN
Render Note In Bullet Format When Appropriate: No
Render Post-Care Instructions In Note?: yes
Number Of Freeze-Thaw Cycles: 2 freeze-thaw cycles
Medical Necessity Information: It is in your best interest to select a reason for this procedure from the list below. All of these items fulfill various CMS LCD requirements except the new and changing color options.
Consent: The patient's consent was obtained including but not limited to risks of crusting, scabbing, blistering, scarring, darker or lighter pigmentary change, recurrence, incomplete removal and infection.
Medical Necessity Clause: This procedure was medically necessary because the lesions that were treated were:
Spray Paint Text: The liquid nitrogen was applied to the skin utilizing a spray paint frosting technique.
Post-Care Instructions: I reviewed with the patient in detail post-care instructions. Patient is to wear sunprotection, and avoid picking at any of the treated lesions. Pt may apply Vaseline to crusted or scabbing areas.
Detail Level: Detailed